# Patient Record
Sex: FEMALE | Race: WHITE | NOT HISPANIC OR LATINO | Employment: OTHER | URBAN - METROPOLITAN AREA
[De-identification: names, ages, dates, MRNs, and addresses within clinical notes are randomized per-mention and may not be internally consistent; named-entity substitution may affect disease eponyms.]

---

## 2017-01-24 RX ORDER — AMLODIPINE BESYLATE 5 MG/1
TABLET ORAL
Qty: 90 TABLET | Refills: 1 | Status: SHIPPED | OUTPATIENT
Start: 2017-01-24 | End: 2017-08-11 | Stop reason: SDUPTHER

## 2017-02-03 RX ORDER — LORAZEPAM 1 MG/1
TABLET ORAL
Qty: 30 TABLET | Refills: 0 | OUTPATIENT
Start: 2017-02-03

## 2017-04-10 RX ORDER — SIMVASTATIN 20 MG
TABLET ORAL
Qty: 90 TABLET | Refills: 0 | Status: SHIPPED | OUTPATIENT
Start: 2017-04-10 | End: 2017-07-05 | Stop reason: SDUPTHER

## 2017-04-15 DIAGNOSIS — R92.8 ABNORMAL MAMMOGRAM: Primary | ICD-10-CM

## 2017-04-18 ENCOUNTER — TELEPHONE (OUTPATIENT)
Dept: FAMILY MEDICINE CLINIC | Facility: CLINIC | Age: 82
End: 2017-04-18

## 2017-05-03 RX ORDER — LORAZEPAM 1 MG/1
TABLET ORAL
Qty: 30 TABLET | Refills: 0 | OUTPATIENT
Start: 2017-05-03 | End: 2017-08-25

## 2017-05-05 ENCOUNTER — TELEPHONE (OUTPATIENT)
Dept: FAMILY MEDICINE CLINIC | Facility: CLINIC | Age: 82
End: 2017-05-05

## 2017-05-05 DIAGNOSIS — N63.20 LEFT BREAST MASS: Primary | ICD-10-CM

## 2017-05-05 DIAGNOSIS — R63.4 ABNORMAL WEIGHT LOSS: ICD-10-CM

## 2017-05-25 ENCOUNTER — OFFICE VISIT (OUTPATIENT)
Dept: FAMILY MEDICINE CLINIC | Facility: CLINIC | Age: 82
End: 2017-05-25

## 2017-05-25 VITALS
TEMPERATURE: 97.7 F | SYSTOLIC BLOOD PRESSURE: 134 MMHG | WEIGHT: 132 LBS | BODY MASS INDEX: 21.97 KG/M2 | OXYGEN SATURATION: 98 % | DIASTOLIC BLOOD PRESSURE: 62 MMHG | HEART RATE: 64 BPM

## 2017-05-25 DIAGNOSIS — I10 BENIGN ESSENTIAL HYPERTENSION: ICD-10-CM

## 2017-05-25 DIAGNOSIS — Z79.899 DRUG THERAPY: ICD-10-CM

## 2017-05-25 DIAGNOSIS — E78.5 DYSLIPIDEMIA: ICD-10-CM

## 2017-05-25 DIAGNOSIS — N63.20 LEFT BREAST MASS: ICD-10-CM

## 2017-05-25 DIAGNOSIS — E03.8 OTHER SPECIFIED HYPOTHYROIDISM: ICD-10-CM

## 2017-05-25 DIAGNOSIS — E11.21 TYPE 2 DIABETES MELLITUS WITH DIABETIC NEPHROPATHY, WITHOUT LONG-TERM CURRENT USE OF INSULIN (HCC): Primary | ICD-10-CM

## 2017-05-25 DIAGNOSIS — R63.4 ABNORMAL WEIGHT LOSS: ICD-10-CM

## 2017-05-25 DIAGNOSIS — I25.10 CORONARY ARTERIOSCLEROSIS IN NATIVE ARTERY: ICD-10-CM

## 2017-05-25 PROCEDURE — 99214 OFFICE O/P EST MOD 30 MIN: CPT | Performed by: FAMILY MEDICINE

## 2017-05-25 RX ORDER — MULTIPLE VITAMINS W/ MINERALS TAB 9MG-400MCG
1 TAB ORAL DAILY
COMMUNITY
End: 2017-08-25 | Stop reason: HOSPADM

## 2017-05-26 LAB
ALBUMIN SERPL-MCNC: 3.9 G/DL (ref 3.5–4.7)
ALBUMIN/GLOB SERPL: 1.8 {RATIO} (ref 1.2–2.2)
ALP SERPL-CCNC: 45 IU/L (ref 39–117)
ALT SERPL-CCNC: 27 IU/L (ref 0–32)
AST SERPL-CCNC: 31 IU/L (ref 0–40)
BASOPHILS # BLD AUTO: 0 X10E3/UL (ref 0–0.2)
BASOPHILS NFR BLD AUTO: 0 %
BILIRUB SERPL-MCNC: 1.1 MG/DL (ref 0–1.2)
BUN SERPL-MCNC: 19 MG/DL (ref 8–27)
BUN/CREAT SERPL: 20 (ref 12–28)
CALCIUM SERPL-MCNC: 9.1 MG/DL (ref 8.7–10.3)
CHLORIDE SERPL-SCNC: 92 MMOL/L (ref 96–106)
CHOLEST SERPL-MCNC: 138 MG/DL (ref 100–199)
CO2 SERPL-SCNC: 22 MMOL/L (ref 18–29)
CREAT SERPL-MCNC: 0.94 MG/DL (ref 0.57–1)
EOSINOPHIL # BLD AUTO: 0 X10E3/UL (ref 0–0.4)
EOSINOPHIL NFR BLD AUTO: 1 %
ERYTHROCYTE [DISTWIDTH] IN BLOOD BY AUTOMATED COUNT: 14 % (ref 12.3–15.4)
GLOBULIN SER CALC-MCNC: 2.2 G/DL (ref 1.5–4.5)
GLUCOSE SERPL-MCNC: 109 MG/DL (ref 65–99)
HBA1C MFR BLD: 5.8 % (ref 4.8–5.6)
HCT VFR BLD AUTO: 40.3 % (ref 34–46.6)
HDLC SERPL-MCNC: 62 MG/DL
HGB BLD-MCNC: 13.8 G/DL (ref 11.1–15.9)
IMM GRANULOCYTES # BLD: 0 X10E3/UL (ref 0–0.1)
IMM GRANULOCYTES NFR BLD: 0 %
LDLC SERPL CALC-MCNC: 57 MG/DL (ref 0–99)
LYMPHOCYTES # BLD AUTO: 0.9 X10E3/UL (ref 0.7–3.1)
LYMPHOCYTES NFR BLD AUTO: 18 %
MCH RBC QN AUTO: 30.3 PG (ref 26.6–33)
MCHC RBC AUTO-ENTMCNC: 34.2 G/DL (ref 31.5–35.7)
MCV RBC AUTO: 88 FL (ref 79–97)
MONOCYTES # BLD AUTO: 0.7 X10E3/UL (ref 0.1–0.9)
MONOCYTES NFR BLD AUTO: 14 %
NEUTROPHILS # BLD AUTO: 3.3 X10E3/UL (ref 1.4–7)
NEUTROPHILS NFR BLD AUTO: 67 %
PLATELET # BLD AUTO: 210 X10E3/UL (ref 150–379)
POTASSIUM SERPL-SCNC: 4.4 MMOL/L (ref 3.5–5.2)
PROT SERPL-MCNC: 6.1 G/DL (ref 6–8.5)
RBC # BLD AUTO: 4.56 X10E6/UL (ref 3.77–5.28)
SODIUM SERPL-SCNC: 132 MMOL/L (ref 134–144)
TRIGL SERPL-MCNC: 93 MG/DL (ref 0–149)
VLDLC SERPL CALC-MCNC: 19 MG/DL (ref 5–40)
WBC # BLD AUTO: 4.9 X10E3/UL (ref 3.4–10.8)

## 2017-06-07 ENCOUNTER — TELEPHONE (OUTPATIENT)
Dept: FAMILY MEDICINE CLINIC | Facility: CLINIC | Age: 82
End: 2017-06-07

## 2017-06-08 RX ORDER — LORAZEPAM 1 MG/1
TABLET ORAL
Qty: 30 TABLET | Refills: 0 | OUTPATIENT
Start: 2017-06-08

## 2017-06-13 NOTE — TELEPHONE ENCOUNTER
It was negative.  I had seen (I thought) and placed in box with note for call back to patient, but I don't see scanned in.  Addendum on u/s report notes was negative.  Will need to call for pathology report again if not in scan file to make sure in chart permanently.    Thanks  RAMOS

## 2017-06-23 ENCOUNTER — TELEPHONE (OUTPATIENT)
Dept: FAMILY MEDICINE CLINIC | Facility: CLINIC | Age: 82
End: 2017-06-23

## 2017-06-24 NOTE — TELEPHONE ENCOUNTER
It does, but can take as long as keep simvistatin dose low and tolerating.  Her dose is lower, though could cut further to 10mg daily.

## 2017-07-06 RX ORDER — SIMVASTATIN 20 MG
TABLET ORAL
Qty: 90 TABLET | Refills: 0 | Status: SHIPPED | OUTPATIENT
Start: 2017-07-06 | End: 2017-10-05 | Stop reason: SDUPTHER

## 2017-07-14 RX ORDER — LEVOTHYROXINE SODIUM 0.07 MG/1
TABLET ORAL
Qty: 30 TABLET | Refills: 6 | Status: SHIPPED | OUTPATIENT
Start: 2017-07-14 | End: 2018-05-10 | Stop reason: SDUPTHER

## 2017-08-11 RX ORDER — AMLODIPINE BESYLATE 5 MG/1
TABLET ORAL
Qty: 90 TABLET | Refills: 1 | Status: SHIPPED | OUTPATIENT
Start: 2017-08-11 | End: 2017-10-24

## 2017-08-25 ENCOUNTER — TELEPHONE (OUTPATIENT)
Dept: FAMILY MEDICINE CLINIC | Facility: CLINIC | Age: 82
End: 2017-08-25

## 2017-08-25 ENCOUNTER — OFFICE VISIT (OUTPATIENT)
Dept: FAMILY MEDICINE CLINIC | Facility: CLINIC | Age: 82
End: 2017-08-25

## 2017-08-25 VITALS
HEART RATE: 65 BPM | BODY MASS INDEX: 26.24 KG/M2 | OXYGEN SATURATION: 99 % | DIASTOLIC BLOOD PRESSURE: 50 MMHG | WEIGHT: 139 LBS | SYSTOLIC BLOOD PRESSURE: 140 MMHG | HEIGHT: 61 IN

## 2017-08-25 DIAGNOSIS — M80.08XA AGE-RELATED OSTEOPOROSIS WITH CURRENT PATHOLOGICAL FRACTURE OF VERTEBRA, INITIAL ENCOUNTER (HCC): ICD-10-CM

## 2017-08-25 DIAGNOSIS — G89.29 CHRONIC MIDLINE LOW BACK PAIN WITHOUT SCIATICA: ICD-10-CM

## 2017-08-25 DIAGNOSIS — M54.50 CHRONIC MIDLINE LOW BACK PAIN WITHOUT SCIATICA: ICD-10-CM

## 2017-08-25 DIAGNOSIS — E03.9 HYPOTHYROIDISM, UNSPECIFIED TYPE: Primary | ICD-10-CM

## 2017-08-25 DIAGNOSIS — I48.0 PAROXYSMAL ATRIAL FIBRILLATION (HCC): ICD-10-CM

## 2017-08-25 DIAGNOSIS — IMO0001 VERTEBRAL COMPRESSION FRACTURE, SEQUELA: ICD-10-CM

## 2017-08-25 DIAGNOSIS — I25.10 CORONARY ARTERIOSCLEROSIS IN NATIVE ARTERY: ICD-10-CM

## 2017-08-25 PROCEDURE — 99214 OFFICE O/P EST MOD 30 MIN: CPT | Performed by: FAMILY MEDICINE

## 2017-08-25 PROCEDURE — 72100 X-RAY EXAM L-S SPINE 2/3 VWS: CPT | Performed by: FAMILY MEDICINE

## 2017-08-25 RX ORDER — DIAZEPAM 10 MG/1
TABLET ORAL
Qty: 21 TABLET | Refills: 0 | Status: SHIPPED | OUTPATIENT
Start: 2017-08-25 | End: 2017-10-24

## 2017-08-25 RX ORDER — POTASSIUM CHLORIDE 1500 MG/1
20 TABLET, FILM COATED, EXTENDED RELEASE ORAL DAILY
COMMUNITY
End: 2018-02-23 | Stop reason: SDUPTHER

## 2017-08-25 RX ORDER — DIAZEPAM 5 MG/1
TABLET ORAL
Qty: 35 TABLET | Refills: 0 | Status: SHIPPED | OUTPATIENT
Start: 2017-08-25 | End: 2018-06-08

## 2017-08-25 RX ORDER — LORAZEPAM 0.5 MG/1
TABLET ORAL
Qty: 14 TABLET | Refills: 0 | Status: SHIPPED | OUTPATIENT
Start: 2017-08-25 | End: 2017-10-24

## 2017-08-25 NOTE — TELEPHONE ENCOUNTER
Please call daughter Laura Grant, home # [phone] and let know I gave her mom scripts to transition to valium from ativan to wean off this class of medications as can increase risks for falls, fractures and cause memory loss. Important to call if any withdrawal symptoms or not acting herself.  I am going to wean her very, very slowly to make sure she is comfortable as possible, but these medications are very dangerous and we need to get her off the ativan.    I gave her Mother a print out of medications with directions, typed directions on the scripts and gave handout on withdrawal symptoms.

## 2017-08-25 NOTE — PATIENT INSTRUCTIONS
I am transitioning you to valium (diazepam) from lorazepam (ativan) to wean you off these medications.  Valium comes in relatively lower doses and longer half life making it easier to wean off (0.5mg of Ativan = 5mg of valium).  If any problems weaning, please call your doctor right away.      Benzodiazepine Withdrawal  Benzodiazepines are prescription medicines that decrease the activity of (depress) the central nervous system and cause changes in certain brain chemicals (neurotransmitters). Withdrawal is a group of physical and mental symptoms that can happen when you suddenly stop taking a medicine.  There are many types of benzodiazepines. Some benzodiazepines take effect quickly and stay in your system for a short amount of time (short-acting). Other benzodiazepines require more time to take effect and stay in your system for longer amounts of time (long-acting). The five most commonly prescribed benzodiazepines are:  · Alprazolam.  · Lorazepam.  · Clonazepam.  · Diazepam.  · Temazepam.  CAUSES   When you take benzodiazepines, your brain needs more and more of the medicine over time in order to get the same effects from it. This increased need is called tolerance. As you develop a tolerance, your brain adapts to the effects of the benzodiazepine and relies on these effects. This is called dependency. Withdrawal happens when you suddenly stop taking your medicine. This does not give your brain enough time to adapt to not having the medicine.  RISK FACTORS  This condition is more likely to develop in:   · People who have taken benzodiazepines for more than 1-2 weeks.  · People who have developed a tolerance for benzodiazepines.  · People who have developed a dependence on benzodiazepines.  · People who take high dosages of benzodiazepines.  · People who take doses of benzodiazepines that are higher than prescribed.  · People who take benzodiazepines without a prescription.  · People who use benzodiazepines with  other substances that depress the central nervous system, such as alcohol.  · People who have a history of drug or alcohol abuse.    SYMPTOMS  Symptoms of this condition may include:   · Difficulty sleeping.    · Anxiety.    · Restlessness.    · Irritability.    · Muscle aches.  · Involuntary shaking or trembling of a body part (tremor).  · Confusion and poor concentration.  · Vomiting.  · Sweating.  · Headaches.    · Feeling or seeing things that are not there (hallucinations).  · Seizures.    Symptoms of withdrawal from short-acting benzodiazepines may develop 1-2 days after you stop taking your medicine, and they may last for 2-4 weeks or longer.  Symptoms of withdrawal from long-acting benzodiazepines may develop 2-7 days after you stop taking your medicine, and they may last for 2-8 weeks or longer.  DIAGNOSIS  This condition may be diagnosed based on:   · Your symptoms.    · A physical exam. Your health care provider may check for:      Rapid heartbeat.      Rapid breathing.      Tremors.      High blood pressure.       · Blood tests.  · Urine tests.  · Your alcohol and drug habits.    · Your medical history.    TREATMENT  Treatment for this condition depends on:  · Your symptoms.  · The type of benzodiazepine you have been taking.  · How long you have been taking benzodiazepines.  Treatment usually involves starting you on a safe and stable dose of a benzodiazepine and then slowly lowering your dosage over time (tapered withdrawal). This may be done at a hospital or a treatment center.   Long-term treatment for this condition may involve medicine, counseling, and support groups.   HOME CARE INSTRUCTIONS  · Take over-the-counter and prescription medicines only as told by your health care provider.  · Check with your health care provider before starting new medicines.  · Keep all follow-up visits as told by your health care provider. This is important.  PREVENTION   · Do not take any benzodiazepines without a  prescription.  · Do not take more than your prescribed dosage.  · Do not mix benzodiazepines with alcohol or other medicines.  · Do not stop taking benzodiazepines without speaking with your health care provider.  SEEK MEDICAL CARE IF:  · You are not able to take your medicines as told by your health care provider.    · You have symptoms that get worse.  · You develop withdrawal symptoms during your tapered withdrawal.    · You develop a craving for drugs or alcohol.     · You experience withdrawal again (relapse).     SEEK IMMEDIATE MEDICAL CARE IF:  · You have a seizure.    · You become very confused.  · You lose consciousness.    · You have difficulty breathing.    · You have serious thoughts about hurting yourself or someone else.     This information is not intended to replace advice given to you by your health care provider. Make sure you discuss any questions you have with your health care provider.     Document Released: 12/06/2012 Document Revised: 04/10/2017 Document Reviewed: 06/08/2016  [employer] Interactive Patient Education ©2017 [employer]

## 2017-08-25 NOTE — PROGRESS NOTES
Subjective   Lisa Grant is a 89 y.o. female. Presents today for   Chief Complaint   Patient presents with   • Hypothyroidism       History of Present Illness    Review of Systems    {Common H&P Review Areas:49853}    Patient Active Problem List   Diagnosis   • Paroxysmal atrial fibrillation   • Angina pectoris   • Anxiety   • Atherosclerosis of aorta   • Coronary arteriosclerosis in native artery   • Benign essential hypertension   • Chronic kidney disease   • Claudication   • Congestive heart failure   • Constipation   • Type 2 diabetes mellitus with diabetic nephropathy   • Dry skin dermatitis   • Dyslipidemia   • Gastroesophageal reflux disease with esophagitis   • Glaucoma   • Hypothyroidism   • Knee pain   • Lymphedema   • Onychomycosis   • Primary insomnia   • Tinea pedis   • Vitamin D deficiency       Allergies   Allergen Reactions   • Codeine    • Hydrocodone-Acetaminophen    • Metoclopramide    • Oxycodone    • Propoxyphene        Current Outpatient Prescriptions on File Prior to Visit   Medication Sig Dispense Refill   • amiodarone (PACERONE) 200 MG tablet Take 200 mg by mouth Daily.     • amLODIPine (NORVASC) 5 MG tablet TAKE ONE TABLET BY MOUTH ONCE DAILY FOR BLOOD PRESSURE 90 tablet 1   • aspirin 81 MG chewable tablet Chew 81 mg Daily.     • clopidogrel (PLAVIX) 75 MG tablet Take 75 mg by mouth Daily.     • furosemide (LASIX) 40 MG tablet TAKE ONE TABLET BY MOUTH ONCE DAILY IN THE MORNING FOR  FLUID  RETENSION  AND  BLOOD  PRESSURE 30 tablet 6   • levothyroxine (SYNTHROID, LEVOTHROID) 75 MCG tablet TAKE ONE TABLET BY MOUTH ONCE DAILY 30 tablet 6   • LORazepam (ATIVAN) 1 MG tablet TAKE ONE TABLET BY MOUTH ONCE DAILY 30 tablet 0   • losartan (COZAAR) 100 MG tablet Take 100 mg by mouth Daily.     • metoprolol succinate XL (TOPROL-XL) 50 MG 24 hr tablet Take  by mouth.     • pantoprazole (PROTONIX) 40 MG EC tablet Take 40 mg by mouth Daily.     • Polyethylene Glycol 3350 granules Take  by mouth daily.    "  • simvastatin (ZOCOR) 20 MG tablet TAKE ONE TABLET BY MOUTH ONCE DAILY FOR CHOLESTEROL AND  HEART  HEALTH 90 tablet 0   • CALCIUM PO Take  by mouth.     • nitroglycerin (NITROLINGUAL) 0.4 MG/SPRAY spray Nitroglycerin 0.4 MG/SPRAY Translingual Solution; Patient Sig: Nitroglycerin 0.4 MG/SPRAY Translingual Solution USE AS DIRECTED.; 0; 11-Sep-2013; Active     • [DISCONTINUED] Multiple Vitamins-Minerals (MULTIVITAMIN WITH MINERALS) tablet tablet Take 1 tablet by mouth Daily.       No current facility-administered medications on file prior to visit.        Objective   Vitals:    08/25/17 1302   BP: 140/50   BP Location: Right arm   Patient Position: Sitting   Cuff Size: Adult   Pulse: 65   SpO2: 99%   Weight: 139 lb (63 kg)   Height: 61\" (154.9 cm)       Physical Exam   Constitutional: She appears well-developed and well-nourished.   HENT:   Head: Normocephalic and atraumatic.   Neck: Neck supple. No JVD present. No thyromegaly present.   Cardiovascular: Normal rate, regular rhythm and normal heart sounds.  Exam reveals no gallop and no friction rub.    No murmur heard.  Pulmonary/Chest: Effort normal and breath sounds normal. No respiratory distress. She has no wheezes. She has no rales.   Abdominal: Soft. Bowel sounds are normal. She exhibits no distension. There is no tenderness. There is no rebound and no guarding.   Musculoskeletal: She exhibits no edema.   Neurological: She is alert.   Skin: Skin is warm and dry.   Psychiatric: She has a normal mood and affect. Her behavior is normal.   Nursing note and vitals reviewed.            Assessment/Plan   {Assess/PlanSmartLinks:89823}         -Follow up: {NUMBERS; 0-10:54568} {time:11}       Current Outpatient Prescriptions:   •  amiodarone (PACERONE) 200 MG tablet, Take 200 mg by mouth Daily., Disp: , Rfl:   •  amLODIPine (NORVASC) 5 MG tablet, TAKE ONE TABLET BY MOUTH ONCE DAILY FOR BLOOD PRESSURE, Disp: 90 tablet, Rfl: 1  •  aspirin 81 MG chewable tablet, Chew 81 mg " Daily., Disp: , Rfl:   •  clopidogrel (PLAVIX) 75 MG tablet, Take 75 mg by mouth Daily., Disp: , Rfl:   •  furosemide (LASIX) 40 MG tablet, TAKE ONE TABLET BY MOUTH ONCE DAILY IN THE MORNING FOR  FLUID  RETENSION  AND  BLOOD  PRESSURE, Disp: 30 tablet, Rfl: 6  •  levothyroxine (SYNTHROID, LEVOTHROID) 75 MCG tablet, TAKE ONE TABLET BY MOUTH ONCE DAILY, Disp: 30 tablet, Rfl: 6  •  LORazepam (ATIVAN) 1 MG tablet, TAKE ONE TABLET BY MOUTH ONCE DAILY, Disp: 30 tablet, Rfl: 0  •  losartan (COZAAR) 100 MG tablet, Take 100 mg by mouth Daily., Disp: , Rfl:   •  metoprolol succinate XL (TOPROL-XL) 50 MG 24 hr tablet, Take  by mouth., Disp: , Rfl:   •  pantoprazole (PROTONIX) 40 MG EC tablet, Take 40 mg by mouth Daily., Disp: , Rfl:   •  Polyethylene Glycol 3350 granules, Take  by mouth daily., Disp: , Rfl:   •  potassium chloride ER (K-TAB) 20 MEQ tablet controlled-release ER tablet, Take 20 mEq by mouth Daily., Disp: , Rfl:   •  simvastatin (ZOCOR) 20 MG tablet, TAKE ONE TABLET BY MOUTH ONCE DAILY FOR CHOLESTEROL AND  HEART  HEALTH, Disp: 90 tablet, Rfl: 0  •  CALCIUM PO, Take  by mouth., Disp: , Rfl:   •  nitroglycerin (NITROLINGUAL) 0.4 MG/SPRAY spray, Nitroglycerin 0.4 MG/SPRAY Translingual Solution; Patient Sig: Nitroglycerin 0.4 MG/SPRAY Translingual Solution USE AS DIRECTED.; 0; 11-Sep-2013; Active, Disp: , Rfl:

## 2017-08-25 NOTE — PROGRESS NOTES
Subjective   Lisa Grant is a 89 y.o. female. Presents today for   Chief Complaint   Patient presents with   • Hypothyroidism     Hospital f/u as well:  Was admitted June and July;  Last admit had cardiac cath;  Stents widely patent;  Found to have a. Fib, declined anticoagulation as had GI bleed in past per notes.  Was started on amiodarone and will need thyroid rechecked.      Hypothyroidism   This is a chronic problem. The current episode started more than 1 year ago. The problem occurs constantly. The problem has been unchanged. Pertinent negatives include no abdominal pain, chest pain, chills, fever, nausea or vomiting. Nothing aggravates the symptoms. Treatments tried: on levothyroxine. Improvement on treatment: Needs rechecked on amiodarone.   Back Pain   This is a recurrent (Denies falls or trauma) problem. The current episode started more than 1 year ago (Prior surgery for radicular symptoms;). The problem occurs constantly. The problem is unchanged. The pain is present in the lumbar spine. The quality of the pain is described as aching. The pain does not radiate. The pain is moderate. The symptoms are aggravated by bending (Not able to stand up straight.). Pertinent negatives include no abdominal pain, chest pain or fever.   Coronary Artery Disease   Presents for follow-up (reports no further cp since d/c from [facility] in July;  Also no further dizziness.) visit. Pertinent negatives include no chest pain, chest pressure, chest tightness, dizziness, leg swelling, palpitations or shortness of breath. Risk factors do not include hypertension. The symptoms have been stable. Compliance with medications is good.   Atrial Fibrillation   Presents for follow-up visit. Symptoms are negative for bradycardia, chest pain, dizziness, hemodynamic instability, hypertension, hypotension, palpitations, shortness of breath, syncope and tachycardia. The symptoms have been stable. Past medical history includes atrial fibrillation  and CAD.     Since last seen had bx negative breast lesion.    Review of Systems   Constitutional: Negative for chills and fever.   Respiratory: Negative for chest tightness and shortness of breath.    Cardiovascular: Negative for chest pain, palpitations, leg swelling and syncope.   Gastrointestinal: Negative for abdominal pain, blood in stool, nausea and vomiting.   Musculoskeletal: Positive for back pain.   Neurological: Negative for dizziness, syncope, facial asymmetry and light-headedness.       The following portions of the patient's history were reviewed and updated as appropriate: allergies, current medications, past medical history, past surgical history and problem list.    Patient Active Problem List   Diagnosis   • Paroxysmal atrial fibrillation   • Angina pectoris   • Anxiety   • Atherosclerosis of aorta   • Coronary arteriosclerosis in native artery   • Benign essential hypertension   • Chronic kidney disease   • Claudication   • Congestive heart failure   • Constipation   • Type 2 diabetes mellitus with diabetic nephropathy   • Dry skin dermatitis   • Dyslipidemia   • Gastroesophageal reflux disease with esophagitis   • Glaucoma   • Hypothyroidism   • Knee pain   • Lymphedema   • Onychomycosis   • Primary insomnia   • Tinea pedis   • Vitamin D deficiency       Allergies   Allergen Reactions   • Codeine    • Hydrocodone-Acetaminophen    • Metoclopramide    • Oxycodone    • Propoxyphene        Current Outpatient Prescriptions on File Prior to Visit   Medication Sig Dispense Refill   • amiodarone (PACERONE) 200 MG tablet Take 200 mg by mouth Daily.     • amLODIPine (NORVASC) 5 MG tablet TAKE ONE TABLET BY MOUTH ONCE DAILY FOR BLOOD PRESSURE 90 tablet 1   • aspirin 81 MG chewable tablet Chew 81 mg Daily.     • clopidogrel (PLAVIX) 75 MG tablet Take 75 mg by mouth Daily.     • furosemide (LASIX) 40 MG tablet TAKE ONE TABLET BY MOUTH ONCE DAILY IN THE MORNING FOR  FLUID  RETENSION  AND  BLOOD  PRESSURE 30  "tablet 6   • levothyroxine (SYNTHROID, LEVOTHROID) 75 MCG tablet TAKE ONE TABLET BY MOUTH ONCE DAILY 30 tablet 6   • LORazepam (ATIVAN) 1 MG tablet TAKE ONE TABLET BY MOUTH ONCE DAILY 30 tablet 0   • losartan (COZAAR) 100 MG tablet Take 100 mg by mouth Daily.     • metoprolol succinate XL (TOPROL-XL) 50 MG 24 hr tablet Take  by mouth.     • pantoprazole (PROTONIX) 40 MG EC tablet Take 40 mg by mouth Daily.     • Polyethylene Glycol 3350 granules Take  by mouth daily.     • simvastatin (ZOCOR) 20 MG tablet TAKE ONE TABLET BY MOUTH ONCE DAILY FOR CHOLESTEROL AND  HEART  HEALTH 90 tablet 0   • CALCIUM PO Take  by mouth.     • nitroglycerin (NITROLINGUAL) 0.4 MG/SPRAY spray Nitroglycerin 0.4 MG/SPRAY Translingual Solution; Patient Sig: Nitroglycerin 0.4 MG/SPRAY Translingual Solution USE AS DIRECTED.; 0; 11-Sep-2013; Active     • [DISCONTINUED] Multiple Vitamins-Minerals (MULTIVITAMIN WITH MINERALS) tablet tablet Take 1 tablet by mouth Daily.       No current facility-administered medications on file prior to visit.        Objective   Vitals:    08/25/17 1302   BP: 140/50   BP Location: Right arm   Patient Position: Sitting   Cuff Size: Adult   Pulse: 65   SpO2: 99%   Weight: 139 lb (63 kg)   Height: 61\" (154.9 cm)       Physical Exam   Constitutional: She appears well-developed and well-nourished.   HENT:   Head: Normocephalic and atraumatic.   Neck: Neck supple. No JVD present. No thyromegaly present.   Cardiovascular: Normal rate, regular rhythm and normal heart sounds.  Exam reveals no gallop and no friction rub.    No murmur heard.  Pulmonary/Chest: Effort normal and breath sounds normal. No respiratory distress. She has no wheezes. She has no rales.   Abdominal: Soft. Bowel sounds are normal. She exhibits no distension. There is no tenderness. There is no rebound and no guarding.   Musculoskeletal: She exhibits no edema.        Lumbar back: She exhibits decreased range of motion, tenderness and bony tenderness. "   Neurological: She is alert.   Skin: Skin is warm and dry.   Psychiatric: She has a normal mood and affect. Her behavior is normal.   Nursing note and vitals reviewed.    XRAY: L-spine  INDICATION:  Low back pain  Wet read:  DDD, spurring;  ? L2 anterior end plate compression fracture.  osteopenia  No Comparative data  Imagine independently viewed by myself  Radiology reading pending.        Assessment/Plan   Lisa was seen today for hypothyroidism.    Diagnoses and all orders for this visit:    Hypothyroidism, unspecified type  -     TSH  -     T4, Free  -     T3, Free    Chronic midline low back pain without sciatica  -     XR Spine Lumbar 2 or 3 View (In Office)  -     DEXA Bone Density Axial  -     CT lumbar spine wo contrast    Coronary arteriosclerosis in native artery    Paroxysmal atrial fibrillation    Vertebral compression fracture, sequela  -     DEXA Bone Density Axial  -     CT lumbar spine wo contrast    Age-related osteoporosis with current pathological fracture of vertebra, initial encounter   -     DEXA Bone Density Axial      -chest pain resolved, medical mgmt of cad;  Had lipids drawn 3 months ago and well controlled;  No further dizziness;  Hx PAF on amiodarone now, will need to check thyroid and monitor closely.  Not on anticoagulation per notes as hx of GI bleed  -cad RF reduction, Lipid, BP and BS control.  -back pain - ? Compression fx vs spurring, will order CT lumbar spine and dexa;  Recommend apap and ice prn for now;  D/w topical pain relief as risks with other medications.  -I discussed today bzd short and long-term risks.  I also discussed that it is highly recommended that the patient wean off this medication given serious risks.  We discussed signs/symptoms of withdrawal and should not stop all at once and that can have withdrawal seizures and in rare cases death has been reported.  After discussion, I related that I am going to stop prescribing this class of drugs for the most part  and for daily, long-term use.  If desires to continue, I will have to refer to a specialist though doesn't constitute a promise psychiatry or another provider will prescribe.  Patient would like to wean.  I will transition to valium 1st as less likely to have withdrawal symptoms and to call or return immediately if has problems.  Patient made aware of risks of being on 2 bzd during transition time.  Patient elderly and concerned about risks of weaning, has daughter per patient lives 10 minutes away and had up date hippa form as okay with calling to notify would attempt a slow wean and call if running into issues.  Patient reports to me today that cannot sleep well and wanted to come off.           -Follow up: 2 months       Current Outpatient Prescriptions:   •  amiodarone (PACERONE) 200 MG tablet, Take 200 mg by mouth Daily., Disp: , Rfl:   •  amLODIPine (NORVASC) 5 MG tablet, TAKE ONE TABLET BY MOUTH ONCE DAILY FOR BLOOD PRESSURE, Disp: 90 tablet, Rfl: 1  •  aspirin 81 MG chewable tablet, Chew 81 mg Daily., Disp: , Rfl:   •  clopidogrel (PLAVIX) 75 MG tablet, Take 75 mg by mouth Daily., Disp: , Rfl:   •  furosemide (LASIX) 40 MG tablet, TAKE ONE TABLET BY MOUTH ONCE DAILY IN THE MORNING FOR  FLUID  RETENSION  AND  BLOOD  PRESSURE, Disp: 30 tablet, Rfl: 6  •  levothyroxine (SYNTHROID, LEVOTHROID) 75 MCG tablet, TAKE ONE TABLET BY MOUTH ONCE DAILY, Disp: 30 tablet, Rfl: 6  •  LORazepam (ATIVAN) 1 MG tablet, TAKE ONE TABLET BY MOUTH ONCE DAILY, Disp: 30 tablet, Rfl: 0  •  losartan (COZAAR) 100 MG tablet, Take 100 mg by mouth Daily., Disp: , Rfl:   •  metoprolol succinate XL (TOPROL-XL) 50 MG 24 hr tablet, Take  by mouth., Disp: , Rfl:   •  pantoprazole (PROTONIX) 40 MG EC tablet, Take 40 mg by mouth Daily., Disp: , Rfl:   •  Polyethylene Glycol 3350 granules, Take  by mouth daily., Disp: , Rfl:   •  potassium chloride ER (K-TAB) 20 MEQ tablet controlled-release ER tablet, Take 20 mEq by mouth Daily., Disp: , Rfl:   •   simvastatin (ZOCOR) 20 MG tablet, TAKE ONE TABLET BY MOUTH ONCE DAILY FOR CHOLESTEROL AND  HEART  HEALTH, Disp: 90 tablet, Rfl: 0  •  CALCIUM PO, Take  by mouth., Disp: , Rfl:   •  nitroglycerin (NITROLINGUAL) 0.4 MG/SPRAY spray, Nitroglycerin 0.4 MG/SPRAY Translingual Solution; Patient Sig: Nitroglycerin 0.4 MG/SPRAY Translingual Solution USE AS DIRECTED.; 0; 11-Sep-2013; Active, Disp: , Rfl:

## 2017-08-26 LAB
T3FREE SERPL-MCNC: 1.5 PG/ML (ref 2–4.4)
T4 FREE SERPL-MCNC: 1.7 NG/DL (ref 0.82–1.77)
TSH SERPL DL<=0.005 MIU/L-ACNC: 2.6 UIU/ML (ref 0.45–4.5)

## 2017-08-27 NOTE — PROGRESS NOTES
Please call the patient regarding her abnormal result.  Patient free T4 and thyroid stimulating hormone are normal.  However T3 the more active thyroid hormone is low.  Her heart medicine can interfere with her thyroid and why checked. Continue levothyroxine and add cytomel 5mcg 1 po daily to take same time as levothyroxine (this replaced T3 directly).  Recheck TSH, free T4 and free T3 in 4 weeks dx hypothyroidism.

## 2017-08-28 DIAGNOSIS — E03.8 OTHER SPECIFIED HYPOTHYROIDISM: Primary | ICD-10-CM

## 2017-08-28 RX ORDER — LIOTHYRONINE SODIUM 5 UG/1
5 TABLET ORAL DAILY
Qty: 30 TABLET | Refills: 5 | Status: SHIPPED | OUTPATIENT
Start: 2017-08-28 | End: 2017-10-02 | Stop reason: SDUPTHER

## 2017-09-15 RX ORDER — ALENDRONATE SODIUM 70 MG/1
70 TABLET ORAL
Qty: 12 TABLET | Refills: 1 | Status: SHIPPED | OUTPATIENT
Start: 2017-09-15 | End: 2018-03-26 | Stop reason: SDUPTHER

## 2017-09-18 ENCOUNTER — TELEPHONE (OUTPATIENT)
Dept: FAMILY MEDICINE CLINIC | Facility: CLINIC | Age: 82
End: 2017-09-18

## 2017-09-19 ENCOUNTER — OFFICE VISIT (OUTPATIENT)
Dept: FAMILY MEDICINE CLINIC | Facility: CLINIC | Age: 82
End: 2017-09-19

## 2017-09-19 VITALS
DIASTOLIC BLOOD PRESSURE: 60 MMHG | TEMPERATURE: 98.2 F | SYSTOLIC BLOOD PRESSURE: 132 MMHG | HEART RATE: 65 BPM | OXYGEN SATURATION: 95 %

## 2017-09-19 DIAGNOSIS — S00.03XA SCALP CONTUSION: ICD-10-CM
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
## 2017-09-19 DIAGNOSIS — R29.898 WEAKNESS OF BOTH LOWER EXTREMITIES: ICD-10-CM

## 2017-09-19 PROCEDURE — 99214 OFFICE O/P EST MOD 30 MIN: CPT | Performed by: FAMILY MEDICINE

## 2017-09-19 NOTE — PROGRESS NOTES
Subjective   Lisa Grant is a 89 y.o. female. Presents today for   Chief Complaint   Patient presents with   • Fall     PT FELL DOWN LAST THURSDAY AND WENT TO [facility]. WILL CALL FOR RECORDS. HER HEAD AND LEGS ARE STILL HURTING.       Fall   The accident occurred 5 to 7 days ago. The fall occurred while walking (TURNED AROUND FROM BED TO GRAB WALKER AND LOST BALANCE, HIT LEFT SIDE OF HEAD;  HAD HEMATOMA;  No LOC;  Had received call from ER reportedly that needed seen right away to do neuro recheck, scans negative in ER;  Records were not sent however.). She fell from a height of 1 to 2 ft. She landed on hard floor. There was no blood loss. The point of impact was the head. Pain location: side of head will hurt if touches o/w no pain. The pain is mild. Exacerbated by: only if touches area hit;   Pertinent negatives include no abdominal pain, bowel incontinence, headaches, loss of consciousness, nausea, numbness, tingling, visual change or vomiting. She has tried ice for the symptoms. The treatment provided mild (Resolving with time.) relief.     Reports lower extremity weakness, was prior to fall and unchanged.  Patient does not drive and has hard time travelling outside of home except for appts.  Had [facility]NA after AMI back in June, but completed.    Review of Systems   Constitutional: Unexpected weight change: Weight gain or loss.   Respiratory: Negative for shortness of breath and wheezing.    Cardiovascular: Negative for chest pain, palpitations and leg swelling.   Gastrointestinal: Negative for abdominal pain, bowel incontinence, nausea and vomiting.   Musculoskeletal: Negative for myalgias.   Neurological: Negative for dizziness, tingling, tremors, loss of consciousness, syncope, facial asymmetry, speech difficulty, weakness, light-headedness, numbness and headaches.       The following portions of the patient's history were reviewed and updated as appropriate: allergies, current medications, past medical history and  problem list.    Patient Active Problem List   Diagnosis   • Paroxysmal atrial fibrillation   • Angina pectoris   • Anxiety   • Atherosclerosis of aorta   • Coronary arteriosclerosis in native artery   • Benign essential hypertension   • Chronic kidney disease   • Claudication   • Congestive heart failure   • Constipation   • Type 2 diabetes mellitus with diabetic nephropathy   • Dry skin dermatitis   • Dyslipidemia   • Gastroesophageal reflux disease with esophagitis   • Glaucoma   • Hypothyroidism   • Knee pain   • Lymphedema   • Onychomycosis   • Primary insomnia   • Tinea pedis   • Vitamin D deficiency       Allergies   Allergen Reactions   • Codeine    • Hydrocodone-Acetaminophen    • Metoclopramide    • Oxycodone    • Propoxyphene        Current Outpatient Prescriptions on File Prior to Visit   Medication Sig Dispense Refill   • alendronate (FOSAMAX) 70 MG tablet Take 1 tablet by mouth Every 7 (Seven) Days. 12 tablet 1   • amiodarone (PACERONE) 200 MG tablet Take 200 mg by mouth Daily.     • amLODIPine (NORVASC) 5 MG tablet TAKE ONE TABLET BY MOUTH ONCE DAILY FOR BLOOD PRESSURE 90 tablet 1   • aspirin 81 MG chewable tablet Chew 81 mg Daily.     • CALCIUM PO Take  by mouth.     • clopidogrel (PLAVIX) 75 MG tablet Take 75 mg by mouth Daily.     • diazePAM (VALIUM) 10 MG tablet 1/2 lorazepam nightly with 1/2 valium nightly x 14 days, then stop lorazepam and valium 1 po nightly x 14 days. 21 tablet 0   • diazePAM (VALIUM) 5 MG tablet After 10mg valium start 1.5 tab 5mg valium nightly x 14 days, then 1 valium nighly x 14 days then see doctor for further weaning. 35 tablet 0   • furosemide (LASIX) 40 MG tablet TAKE ONE TABLET BY MOUTH ONCE DAILY IN THE MORNING FOR  FLUID  RETENSION  AND  BLOOD  PRESSURE 30 tablet 6   • levothyroxine (SYNTHROID, LEVOTHROID) 75 MCG tablet TAKE ONE TABLET BY MOUTH ONCE DAILY 30 tablet 6   • liothyronine (CYTOMEL) 5 MCG tablet Take 1 tablet by mouth Daily. 30 tablet 5   • LORazepam  (ATIVAN) 0.5 MG tablet 1 lorazepam nightly with 1/2 valium nightly x 14 days, then stop lorazepam and continue valium 1 po nightly x 14 days. 14 tablet 0   • losartan (COZAAR) 100 MG tablet Take 100 mg by mouth Daily.     • metoprolol succinate XL (TOPROL-XL) 50 MG 24 hr tablet Take  by mouth.     • nitroglycerin (NITROLINGUAL) 0.4 MG/SPRAY spray Nitroglycerin 0.4 MG/SPRAY Translingual Solution; Patient Sig: Nitroglycerin 0.4 MG/SPRAY Translingual Solution USE AS DIRECTED.; 0; 11-Sep-2013; Active     • pantoprazole (PROTONIX) 40 MG EC tablet Take 40 mg by mouth Daily.     • Polyethylene Glycol 3350 granules Take  by mouth daily.     • potassium chloride ER (K-TAB) 20 MEQ tablet controlled-release ER tablet Take 20 mEq by mouth Daily.     • simvastatin (ZOCOR) 20 MG tablet TAKE ONE TABLET BY MOUTH ONCE DAILY FOR CHOLESTEROL AND  HEART  HEALTH 90 tablet 0     No current facility-administered medications on file prior to visit.        Objective   Vitals:    09/19/17 1008   BP: 132/60   BP Location: Left arm   Patient Position: Sitting   Cuff Size: Adult   Pulse: 65   Temp: 98.2 °F (36.8 °C)   TempSrc: Oral   SpO2: 95%   Weight: Comment: PT IN WHEELCHAIR, UNABLE TO WEIGH TODAY       Physical Exam   Constitutional: She appears well-developed and well-nourished.   HENT:   Head: Normocephalic. Head is with contusion. Head is without raccoon's eyes.       Right Ear: Tympanic membrane, external ear and ear canal normal.   Left Ear: Tympanic membrane, external ear and ear canal normal.   Mouth/Throat: Uvula is midline, oropharynx is clear and moist and mucous membranes are normal.   Eyes: EOM are normal. Pupils are equal, round, and reactive to light.   Neck: Neck supple. No JVD present. No thyromegaly present.   Cardiovascular: Normal rate, regular rhythm and normal heart sounds.  Exam reveals no gallop and no friction rub.    No murmur heard.  Pulmonary/Chest: Effort normal and breath sounds normal. No respiratory distress.  She has no wheezes. She has no rales.   Abdominal: Soft. Bowel sounds are normal. She exhibits no distension. There is no tenderness. There is no rebound and no guarding.   Musculoskeletal: She exhibits no edema.   Neurological: She is alert. She displays normal reflexes. No cranial nerve deficit. She exhibits normal muscle tone. Coordination normal.   No pronator drift  Normal FTN  B/l lower extremity +3-4/5     Skin: Skin is warm and dry.   Psychiatric: She has a normal mood and affect. Her behavior is normal.   Nursing note and vitals reviewed.      Assessment/Plan   Lisa was seen today for fall.    Diagnoses and all orders for this visit:    Fall, subsequent encounter    Scalp contusion    Weakness of both lower extremities    -patient neurologically intact;  No focal neuro deficits;  Patient fell and is high risk for falls;  Has lower extremity weakness;  Would benefit from PT to reduce fall risk and improve mobility related ADLs;  Patient home bound status and unable to travel far due health status.  Had [facility]NA in past, will order again.  -ice 2-3 times a day to scalp as needed for pain;  APAP would be ok for pain as well.       -Follow up: 10/24/17        Current Outpatient Prescriptions:   •  alendronate (FOSAMAX) 70 MG tablet, Take 1 tablet by mouth Every 7 (Seven) Days., Disp: 12 tablet, Rfl: 1  •  amiodarone (PACERONE) 200 MG tablet, Take 200 mg by mouth Daily., Disp: , Rfl:   •  amLODIPine (NORVASC) 5 MG tablet, TAKE ONE TABLET BY MOUTH ONCE DAILY FOR BLOOD PRESSURE, Disp: 90 tablet, Rfl: 1  •  aspirin 81 MG chewable tablet, Chew 81 mg Daily., Disp: , Rfl:   •  CALCIUM PO, Take  by mouth., Disp: , Rfl:   •  clopidogrel (PLAVIX) 75 MG tablet, Take 75 mg by mouth Daily., Disp: , Rfl:   •  diazePAM (VALIUM) 10 MG tablet, 1/2 lorazepam nightly with 1/2 valium nightly x 14 days, then stop lorazepam and valium 1 po nightly x 14 days., Disp: 21 tablet, Rfl: 0  •  diazePAM (VALIUM) 5 MG tablet, After 10mg valium  start 1.5 tab 5mg valium nightly x 14 days, then 1 valium nighly x 14 days then see doctor for further weaning., Disp: 35 tablet, Rfl: 0  •  furosemide (LASIX) 40 MG tablet, TAKE ONE TABLET BY MOUTH ONCE DAILY IN THE MORNING FOR  FLUID  RETENSION  AND  BLOOD  PRESSURE, Disp: 30 tablet, Rfl: 6  •  levothyroxine (SYNTHROID, LEVOTHROID) 75 MCG tablet, TAKE ONE TABLET BY MOUTH ONCE DAILY, Disp: 30 tablet, Rfl: 6  •  liothyronine (CYTOMEL) 5 MCG tablet, Take 1 tablet by mouth Daily., Disp: 30 tablet, Rfl: 5  •  LORazepam (ATIVAN) 0.5 MG tablet, 1 lorazepam nightly with 1/2 valium nightly x 14 days, then stop lorazepam and continue valium 1 po nightly x 14 days., Disp: 14 tablet, Rfl: 0  •  losartan (COZAAR) 100 MG tablet, Take 100 mg by mouth Daily., Disp: , Rfl:   •  metoprolol succinate XL (TOPROL-XL) 50 MG 24 hr tablet, Take  by mouth., Disp: , Rfl:   •  nitroglycerin (NITROLINGUAL) 0.4 MG/SPRAY spray, Nitroglycerin 0.4 MG/SPRAY Translingual Solution; Patient Sig: Nitroglycerin 0.4 MG/SPRAY Translingual Solution USE AS DIRECTED.; 0; 11-Sep-2013; Active, Disp: , Rfl:   •  pantoprazole (PROTONIX) 40 MG EC tablet, Take 40 mg by mouth Daily., Disp: , Rfl:   •  Polyethylene Glycol 3350 granules, Take  by mouth daily., Disp: , Rfl:   •  potassium chloride ER (K-TAB) 20 MEQ tablet controlled-release ER tablet, Take 20 mEq by mouth Daily., Disp: , Rfl:   •  simvastatin (ZOCOR) 20 MG tablet, TAKE ONE TABLET BY MOUTH ONCE DAILY FOR CHOLESTEROL AND  HEART  HEALTH, Disp: 90 tablet, Rfl: 0

## 2017-09-21 ENCOUNTER — TELEPHONE (OUTPATIENT)
Dept: FAMILY MEDICINE CLINIC | Facility: CLINIC | Age: 82
End: 2017-09-21

## 2017-09-29 LAB
T3FREE SERPL-MCNC: 1.7 PG/ML (ref 2–4.4)
T4 FREE SERPL-MCNC: 1.54 NG/DL (ref 0.93–1.7)
TSH SERPL DL<=0.005 MIU/L-ACNC: 1.81 MIU/ML (ref 0.27–4.2)

## 2017-10-02 DIAGNOSIS — E03.8 OTHER SPECIFIED HYPOTHYROIDISM: Primary | ICD-10-CM

## 2017-10-02 RX ORDER — LIOTHYRONINE SODIUM 5 UG/1
TABLET ORAL
Qty: 45 TABLET | Refills: 5 | Status: SHIPPED | OUTPATIENT
Start: 2017-10-02 | End: 2018-05-04 | Stop reason: SDUPTHER

## 2017-10-05 RX ORDER — SIMVASTATIN 20 MG
TABLET ORAL
Qty: 90 TABLET | Refills: 1 | Status: SHIPPED | OUTPATIENT
Start: 2017-10-05 | End: 2017-12-08 | Stop reason: ALTCHOICE

## 2017-10-24 ENCOUNTER — OFFICE VISIT (OUTPATIENT)
Dept: FAMILY MEDICINE CLINIC | Facility: CLINIC | Age: 82
End: 2017-10-24

## 2017-10-24 VITALS
TEMPERATURE: 97.3 F | HEIGHT: 61 IN | HEART RATE: 68 BPM | BODY MASS INDEX: 24.17 KG/M2 | WEIGHT: 128 LBS | DIASTOLIC BLOOD PRESSURE: 70 MMHG | OXYGEN SATURATION: 98 % | SYSTOLIC BLOOD PRESSURE: 146 MMHG

## 2017-10-24 DIAGNOSIS — R60.0 LOWER EXTREMITY EDEMA: ICD-10-CM

## 2017-10-24 DIAGNOSIS — I10 BENIGN ESSENTIAL HYPERTENSION: ICD-10-CM

## 2017-10-24 DIAGNOSIS — R23.4 SKIN THICKENING: Primary | ICD-10-CM

## 2017-10-24 DIAGNOSIS — G89.29 CHRONIC BILATERAL LOW BACK PAIN WITHOUT SCIATICA: ICD-10-CM

## 2017-10-24 DIAGNOSIS — M54.50 CHRONIC BILATERAL LOW BACK PAIN WITHOUT SCIATICA: ICD-10-CM

## 2017-10-24 DIAGNOSIS — L85.3 DRY SKIN DERMATITIS: ICD-10-CM

## 2017-10-24 PROCEDURE — 99214 OFFICE O/P EST MOD 30 MIN: CPT | Performed by: FAMILY MEDICINE

## 2017-10-24 RX ORDER — FUROSEMIDE 40 MG/1
TABLET ORAL
Qty: 30 TABLET | Refills: 6 | Status: SHIPPED | OUTPATIENT
Start: 2017-10-24 | End: 2018-02-23 | Stop reason: SDUPTHER

## 2017-10-24 RX ORDER — METOPROLOL SUCCINATE 100 MG/1
100 TABLET, EXTENDED RELEASE ORAL DAILY
Qty: 30 TABLET | Refills: 5 | Status: SHIPPED | OUTPATIENT
Start: 2017-10-24 | End: 2018-04-30 | Stop reason: SDUPTHER

## 2017-10-24 RX ORDER — UREA 40 %
CREAM (GRAM) TOPICAL
Qty: 198 G | Refills: 5 | Status: SHIPPED | OUTPATIENT
Start: 2017-10-24 | End: 2019-01-25

## 2017-10-24 RX ORDER — GABAPENTIN 100 MG/1
CAPSULE ORAL
Qty: 60 CAPSULE | Refills: 2 | Status: SHIPPED | OUTPATIENT
Start: 2017-10-24 | End: 2019-01-25

## 2017-10-24 NOTE — PROGRESS NOTES
Subjective   Lisa Grant is a 89 y.o. female. Presents today for   Chief Complaint   Patient presents with   • Follow-up     pt here for 2 month follow up   • Edema     pt's legs and feet are very swollen. she said they have gotten worse the last 3 weeks. she said they usually swell during the day and go down at night, but now they stay swollen all the time.   • Back Pain     pt's back has been hurting so bad, she can not sleep at night.       Back Pain   This is a chronic problem. The current episode started more than 1 month ago. The problem occurs constantly. The problem is unchanged. The pain is present in the lumbar spine. The quality of the pain is described as aching. The pain does not radiate. The pain is severe. The symptoms are aggravated by bending and twisting. Pertinent negatives include no abdominal pain, chest pain, leg pain, numbness or tingling. (Had CT lumbar spine and extensive arthritis changes, see level by level by report.  No radiation, just swelling) She has tried analgesics for the symptoms. The treatment provided no relief (Not sure what dose of apap to take).   Leg Swelling   This is a chronic problem. The current episode started more than 1 month ago. The problem occurs constantly. The problem has been gradually worsening. Associated symptoms include arthralgias. Pertinent negatives include no abdominal pain, chest pain, nausea, numbness or vomiting. Associated symptoms comments: No chest pain, no pnd/orthopnea;  No soa. The symptoms are aggravated by walking and standing. Treatments tried: lasix and elevation. The treatment provided no relief.       Review of Systems   Respiratory: Negative for shortness of breath and wheezing.    Cardiovascular: Positive for leg swelling. Negative for chest pain.   Gastrointestinal: Negative for abdominal pain, nausea and vomiting.   Musculoskeletal: Positive for arthralgias and back pain.   Neurological: Negative for tingling, syncope and numbness.        The following portions of the patient's history were reviewed and updated as appropriate: allergies, current medications, past medical history and problem list.    Patient Active Problem List   Diagnosis   • Paroxysmal atrial fibrillation   • Angina pectoris   • Anxiety   • Atherosclerosis of aorta   • Coronary arteriosclerosis in native artery   • Benign essential hypertension   • Chronic kidney disease   • Claudication   • Congestive heart failure   • Constipation   • Type 2 diabetes mellitus with diabetic nephropathy   • Dry skin dermatitis   • Dyslipidemia   • Gastroesophageal reflux disease with esophagitis   • Glaucoma   • Hypothyroidism   • Knee pain   • Lymphedema   • Onychomycosis   • Primary insomnia   • Tinea pedis   • Vitamin D deficiency       Allergies   Allergen Reactions   • Codeine    • Hydrocodone-Acetaminophen    • Metoclopramide    • Oxycodone    • Propoxyphene        Current Outpatient Prescriptions on File Prior to Visit   Medication Sig Dispense Refill   • alendronate (FOSAMAX) 70 MG tablet Take 1 tablet by mouth Every 7 (Seven) Days. 12 tablet 1   • amLODIPine (NORVASC) 5 MG tablet TAKE ONE TABLET BY MOUTH ONCE DAILY FOR BLOOD PRESSURE 90 tablet 1   • aspirin 81 MG chewable tablet Chew 81 mg Daily.     • CALCIUM PO Take  by mouth.     • clopidogrel (PLAVIX) 75 MG tablet Take 75 mg by mouth Daily.     • diazePAM (VALIUM) 5 MG tablet After 10mg valium start 1.5 tab 5mg valium nightly x 14 days, then 1 valium nighly x 14 days then see doctor for further weaning. 35 tablet 0   • furosemide (LASIX) 40 MG tablet TAKE ONE TABLET BY MOUTH ONCE DAILY IN THE MORNING FOR  FLUID  RETENSION  AND  BLOOD  PRESSURE 30 tablet 6   • levothyroxine (SYNTHROID, LEVOTHROID) 75 MCG tablet TAKE ONE TABLET BY MOUTH ONCE DAILY 30 tablet 6   • liothyronine (CYTOMEL) 5 MCG tablet Take 1.5 tablets by mouth daily 45 tablet 5   • losartan (COZAAR) 100 MG tablet Take 100 mg by mouth Daily.     • metoprolol succinate  "XL (TOPROL-XL) 50 MG 24 hr tablet Take  by mouth.     • nitroglycerin (NITROLINGUAL) 0.4 MG/SPRAY spray Nitroglycerin 0.4 MG/SPRAY Translingual Solution; Patient Sig: Nitroglycerin 0.4 MG/SPRAY Translingual Solution USE AS DIRECTED.; 0; 11-Sep-2013; Active     • pantoprazole (PROTONIX) 40 MG EC tablet Take 40 mg by mouth Daily.     • Polyethylene Glycol 3350 granules Take  by mouth daily.     • potassium chloride ER (K-TAB) 20 MEQ tablet controlled-release ER tablet Take 20 mEq by mouth Daily.     • simvastatin (ZOCOR) 20 MG tablet TAKE ONE TABLET BY MOUTH ONCE DAILY FOR CHOLESTEROL AND HEART HEALTH 90 tablet 1   • [DISCONTINUED] amiodarone (PACERONE) 200 MG tablet Take 200 mg by mouth Daily.     • [DISCONTINUED] diazePAM (VALIUM) 10 MG tablet 1/2 lorazepam nightly with 1/2 valium nightly x 14 days, then stop lorazepam and valium 1 po nightly x 14 days. 21 tablet 0   • [DISCONTINUED] LORazepam (ATIVAN) 0.5 MG tablet 1 lorazepam nightly with 1/2 valium nightly x 14 days, then stop lorazepam and continue valium 1 po nightly x 14 days. 14 tablet 0     No current facility-administered medications on file prior to visit.        Objective   Vitals:    10/24/17 1344   BP: 146/70   BP Location: Left arm   Patient Position: Sitting   Cuff Size: Adult   Pulse: 68   Temp: 97.3 °F (36.3 °C)   TempSrc: Oral   SpO2: 98%   Weight: 128 lb (58.1 kg)   Height: 61\" (154.9 cm)       Physical Exam   Constitutional: She appears well-developed and well-nourished.   HENT:   Head: Normocephalic and atraumatic.   Neck: Neck supple. No JVD present. No thyromegaly present.   Cardiovascular: Normal rate, regular rhythm and normal heart sounds.  Exam reveals no gallop and no friction rub.    No murmur heard.  Pulmonary/Chest: Effort normal and breath sounds normal. No respiratory distress. She has no wheezes. She has no rales.   Abdominal: Soft. Bowel sounds are normal. She exhibits no distension. There is no tenderness. There is no rebound and " no guarding.   Musculoskeletal: She exhibits edema (2+ pitting edema;   thickened skin, skin cracking.).   Neurological: She is alert.   Skin: Skin is warm and dry.   Psychiatric: She has a normal mood and affect. Her behavior is normal.   Nursing note and vitals reviewed.      Assessment/Plan   Lisa was seen today for follow-up, edema and back pain.    Diagnoses and all orders for this visit:    Skin thickening  -     urea (CARMOL) 40 % cream; Apply  topically Daily. To lower extremities and feet    Dry skin dermatitis  -     urea (CARMOL) 40 % cream; Apply  topically Daily. To lower extremities and feet    Lower extremity edema  -     furosemide (LASIX) 40 MG tablet; 2 po daily x 7 days, then 1 po daily    Chronic bilateral low back pain without sciatica  -     gabapentin (NEURONTIN) 100 MG capsule; 1 po nightly x 14 days, then 1 po twice daily for back pain    Benign essential hypertension  -     metoprolol succinate XL (TOPROL-XL) 100 MG 24 hr tablet; Take 1 tablet by mouth Daily.          1) Stop amlodipine - bp medication that can cause leg swelling  2) Will increase metoprolol xl from 50mg daily to 100mg daily since stopping amlodipine.  May take 2 of the 50mg daily until out.  I sent in script for 100mg.  For high blood pressure.  3) Increase lasix to 2 tabs daily x 7 days, then back to 1 daily for leg swelling  4) I gave script for gabapentin for back pain, start night time then twice daily.  This is a low dose.  5) May take tylenol 650mg every 6 hours as needed for back pain (over the counter).  6) I sent in special cream for dry cracking legs.         -Follow up: 1 month       Current Outpatient Prescriptions:   •  alendronate (FOSAMAX) 70 MG tablet, Take 1 tablet by mouth Every 7 (Seven) Days., Disp: 12 tablet, Rfl: 1  •  amLODIPine (NORVASC) 5 MG tablet, TAKE ONE TABLET BY MOUTH ONCE DAILY FOR BLOOD PRESSURE, Disp: 90 tablet, Rfl: 1  •  aspirin 81 MG chewable tablet, Chew 81 mg Daily., Disp: , Rfl:   •   CALCIUM PO, Take  by mouth., Disp: , Rfl:   •  clopidogrel (PLAVIX) 75 MG tablet, Take 75 mg by mouth Daily., Disp: , Rfl:   •  diazePAM (VALIUM) 5 MG tablet, After 10mg valium start 1.5 tab 5mg valium nightly x 14 days, then 1 valium nighly x 14 days then see doctor for further weaning., Disp: 35 tablet, Rfl: 0  •  furosemide (LASIX) 40 MG tablet, TAKE ONE TABLET BY MOUTH ONCE DAILY IN THE MORNING FOR  FLUID  RETENSION  AND  BLOOD  PRESSURE, Disp: 30 tablet, Rfl: 6  •  levothyroxine (SYNTHROID, LEVOTHROID) 75 MCG tablet, TAKE ONE TABLET BY MOUTH ONCE DAILY, Disp: 30 tablet, Rfl: 6  •  liothyronine (CYTOMEL) 5 MCG tablet, Take 1.5 tablets by mouth daily, Disp: 45 tablet, Rfl: 5  •  losartan (COZAAR) 100 MG tablet, Take 100 mg by mouth Daily., Disp: , Rfl:   •  metoprolol succinate XL (TOPROL-XL) 50 MG 24 hr tablet, Take  by mouth., Disp: , Rfl:   •  nitroglycerin (NITROLINGUAL) 0.4 MG/SPRAY spray, Nitroglycerin 0.4 MG/SPRAY Translingual Solution; Patient Sig: Nitroglycerin 0.4 MG/SPRAY Translingual Solution USE AS DIRECTED.; 0; 11-Sep-2013; Active, Disp: , Rfl:   •  pantoprazole (PROTONIX) 40 MG EC tablet, Take 40 mg by mouth Daily., Disp: , Rfl:   •  Polyethylene Glycol 3350 granules, Take  by mouth daily., Disp: , Rfl:   •  potassium chloride ER (K-TAB) 20 MEQ tablet controlled-release ER tablet, Take 20 mEq by mouth Daily., Disp: , Rfl:   •  simvastatin (ZOCOR) 20 MG tablet, TAKE ONE TABLET BY MOUTH ONCE DAILY FOR CHOLESTEROL AND HEART HEALTH, Disp: 90 tablet, Rfl: 1

## 2017-10-24 NOTE — PATIENT INSTRUCTIONS
1) Stop amlodipine - bp medication that can cause leg swelling  2) Will increase metoprolol xl from 50mg daily to 100mg daily since stopping amlodipine.  May take 2 of the 50mg daily until out.  I sent in script for 100mg.  For high blood pressure.  3) Increase lasix to 2 tabs daily x 7 days, then back to 1 daily for leg swelling  4) I gave script for gabapentin for back pain, start night time then twice daily.  This is a low dose.  5) May take tylenol 650mg every 6 hours as needed for back pain (over the counter).  6) I sent in special cream for dry cracking legs.

## 2017-11-16 ENCOUNTER — OFFICE VISIT (OUTPATIENT)
Dept: FAMILY MEDICINE CLINIC | Facility: CLINIC | Age: 82
End: 2017-11-16

## 2017-11-16 VITALS
SYSTOLIC BLOOD PRESSURE: 140 MMHG | DIASTOLIC BLOOD PRESSURE: 60 MMHG | OXYGEN SATURATION: 99 % | HEART RATE: 61 BPM | TEMPERATURE: 97.5 F

## 2017-11-16 DIAGNOSIS — I10 BENIGN ESSENTIAL HYPERTENSION: Primary | ICD-10-CM

## 2017-11-16 DIAGNOSIS — I89.0 LYMPHEDEMA: ICD-10-CM

## 2017-11-16 PROCEDURE — 99213 OFFICE O/P EST LOW 20 MIN: CPT | Performed by: FAMILY MEDICINE

## 2017-11-16 NOTE — PROGRESS NOTES
Subjective   Lisa Grant is a 89 y.o. female. Presents today for   Chief Complaint   Patient presents with   • Follow-up     fell at home had pt and home health and doing better.       Hypertension   This is a chronic (had changed BP meds to see if off amlodipine swelling better, of which is somewhat better) problem. The current episode started more than 1 year ago. The problem is unchanged. The problem is controlled. Associated symptoms include peripheral edema. Pertinent negatives include no chest pain, orthopnea, palpitations, PND or shortness of breath. There are no associated agents to hypertension. Past treatments include angiotensin blockers and beta blockers. The current treatment provides moderate improvement. There are no compliance problems.      Stopped amlodipine due to swelling and increased losartan, bp fine;  Still some swelling daytime  Review of Systems   Respiratory: Negative for shortness of breath.    Cardiovascular: Negative for chest pain, palpitations, orthopnea and PND.       The following portions of the patient's history were reviewed and updated as appropriate: allergies, current medications, past medical history and problem list.    Patient Active Problem List   Diagnosis   • Paroxysmal atrial fibrillation   • Angina pectoris   • Anxiety   • Atherosclerosis of aorta   • Coronary arteriosclerosis in native artery   • Benign essential hypertension   • Chronic kidney disease   • Claudication   • Congestive heart failure   • Constipation   • Type 2 diabetes mellitus with diabetic nephropathy   • Dry skin dermatitis   • Dyslipidemia   • Gastroesophageal reflux disease with esophagitis   • Glaucoma   • Hypothyroidism   • Knee pain   • Lymphedema   • Onychomycosis   • Primary insomnia   • Tinea pedis   • Vitamin D deficiency       Allergies   Allergen Reactions   • Codeine    • Hydrocodone-Acetaminophen    • Metoclopramide    • Oxycodone    • Propoxyphene        Current Outpatient  Prescriptions on File Prior to Visit   Medication Sig Dispense Refill   • alendronate (FOSAMAX) 70 MG tablet Take 1 tablet by mouth Every 7 (Seven) Days. 12 tablet 1   • aspirin 81 MG chewable tablet Chew 81 mg Daily.     • CALCIUM PO Take  by mouth.     • clopidogrel (PLAVIX) 75 MG tablet Take 75 mg by mouth Daily.     • diazePAM (VALIUM) 5 MG tablet After 10mg valium start 1.5 tab 5mg valium nightly x 14 days, then 1 valium nighly x 14 days then see doctor for further weaning. 35 tablet 0   • furosemide (LASIX) 40 MG tablet 2 po daily x 7 days, then 1 po daily 30 tablet 6   • gabapentin (NEURONTIN) 100 MG capsule 1 po nightly x 14 days, then 1 po twice daily for back pain 60 capsule 2   • levothyroxine (SYNTHROID, LEVOTHROID) 75 MCG tablet TAKE ONE TABLET BY MOUTH ONCE DAILY 30 tablet 6   • liothyronine (CYTOMEL) 5 MCG tablet Take 1.5 tablets by mouth daily 45 tablet 5   • losartan (COZAAR) 100 MG tablet Take 100 mg by mouth Daily.     • metoprolol succinate XL (TOPROL-XL) 100 MG 24 hr tablet Take 1 tablet by mouth Daily. 30 tablet 5   • nitroglycerin (NITROLINGUAL) 0.4 MG/SPRAY spray Nitroglycerin 0.4 MG/SPRAY Translingual Solution; Patient Sig: Nitroglycerin 0.4 MG/SPRAY Translingual Solution USE AS DIRECTED.; 0; 11-Sep-2013; Active     • pantoprazole (PROTONIX) 40 MG EC tablet Take 40 mg by mouth Daily.     • Polyethylene Glycol 3350 granules Take  by mouth daily.     • potassium chloride ER (K-TAB) 20 MEQ tablet controlled-release ER tablet Take 20 mEq by mouth Daily.     • simvastatin (ZOCOR) 20 MG tablet TAKE ONE TABLET BY MOUTH ONCE DAILY FOR CHOLESTEROL AND HEART HEALTH 90 tablet 1   • urea (CARMOL) 40 % cream Apply  topically Daily. To lower extremities and feet 198 g 5     No current facility-administered medications on file prior to visit.        Objective   Vitals:    11/16/17 1313   BP: 140/60   Pulse: 61   Temp: 97.5 °F (36.4 °C)   SpO2: 99%       Physical Exam   Constitutional: She appears  well-developed and well-nourished.   HENT:   Head: Normocephalic and atraumatic.   Neck: Neck supple. No JVD present. No thyromegaly present.   Cardiovascular: Normal rate, regular rhythm and normal heart sounds.  Exam reveals no gallop and no friction rub.    No murmur heard.  Pulmonary/Chest: Effort normal and breath sounds normal. No respiratory distress. She has no wheezes. She has no rales.   Abdominal: Soft. Bowel sounds are normal. She exhibits no distension. There is no tenderness. There is no rebound and no guarding.   Musculoskeletal: She exhibits edema.   Neurological: She is alert.   Skin: Skin is warm and dry.   Psychiatric: She has a normal mood and affect. Her behavior is normal.   Nursing note and vitals reviewed.      Assessment/Plan   Lisa was seen today for follow-up.    Diagnoses and all orders for this visit:    Benign essential hypertension    Lymphedema      -elevate legs as able;  Follow low Na diet  -hypertension - controlled, continue medications  -reports no further falls and doing better over all       -Follow up: 6 months       Current Outpatient Prescriptions:   •  alendronate (FOSAMAX) 70 MG tablet, Take 1 tablet by mouth Every 7 (Seven) Days., Disp: 12 tablet, Rfl: 1  •  aspirin 81 MG chewable tablet, Chew 81 mg Daily., Disp: , Rfl:   •  CALCIUM PO, Take  by mouth., Disp: , Rfl:   •  clopidogrel (PLAVIX) 75 MG tablet, Take 75 mg by mouth Daily., Disp: , Rfl:   •  diazePAM (VALIUM) 5 MG tablet, After 10mg valium start 1.5 tab 5mg valium nightly x 14 days, then 1 valium nighly x 14 days then see doctor for further weaning., Disp: 35 tablet, Rfl: 0  •  furosemide (LASIX) 40 MG tablet, 2 po daily x 7 days, then 1 po daily, Disp: 30 tablet, Rfl: 6  •  gabapentin (NEURONTIN) 100 MG capsule, 1 po nightly x 14 days, then 1 po twice daily for back pain, Disp: 60 capsule, Rfl: 2  •  levothyroxine (SYNTHROID, LEVOTHROID) 75 MCG tablet, TAKE ONE TABLET BY MOUTH ONCE DAILY, Disp: 30 tablet, Rfl:  6  •  liothyronine (CYTOMEL) 5 MCG tablet, Take 1.5 tablets by mouth daily, Disp: 45 tablet, Rfl: 5  •  losartan (COZAAR) 100 MG tablet, Take 100 mg by mouth Daily., Disp: , Rfl:   •  metoprolol succinate XL (TOPROL-XL) 100 MG 24 hr tablet, Take 1 tablet by mouth Daily., Disp: 30 tablet, Rfl: 5  •  nitroglycerin (NITROLINGUAL) 0.4 MG/SPRAY spray, Nitroglycerin 0.4 MG/SPRAY Translingual Solution; Patient Sig: Nitroglycerin 0.4 MG/SPRAY Translingual Solution USE AS DIRECTED.; 0; 11-Sep-2013; Active, Disp: , Rfl:   •  pantoprazole (PROTONIX) 40 MG EC tablet, Take 40 mg by mouth Daily., Disp: , Rfl:   •  Polyethylene Glycol 3350 granules, Take  by mouth daily., Disp: , Rfl:   •  potassium chloride ER (K-TAB) 20 MEQ tablet controlled-release ER tablet, Take 20 mEq by mouth Daily., Disp: , Rfl:   •  simvastatin (ZOCOR) 20 MG tablet, TAKE ONE TABLET BY MOUTH ONCE DAILY FOR CHOLESTEROL AND HEART HEALTH, Disp: 90 tablet, Rfl: 1  •  urea (CARMOL) 40 % cream, Apply  topically Daily. To lower extremities and feet, Disp: 198 g, Rfl: 5

## 2017-12-08 RX ORDER — PRAVASTATIN SODIUM 20 MG
20 TABLET ORAL DAILY
Qty: 30 TABLET | Refills: 5 | Status: SHIPPED | OUTPATIENT
Start: 2017-12-08 | End: 2018-06-28 | Stop reason: SDUPTHER

## 2018-01-29 ENCOUNTER — TELEPHONE (OUTPATIENT)
Dept: FAMILY MEDICINE CLINIC | Facility: CLINIC | Age: 83
End: 2018-01-29

## 2018-01-29 RX ORDER — TRAZODONE HYDROCHLORIDE 50 MG/1
50 TABLET ORAL NIGHTLY
Qty: 30 TABLET | Refills: 2 | Status: SHIPPED | OUTPATIENT
Start: 2018-01-29 | End: 2019-01-25

## 2018-02-23 ENCOUNTER — OFFICE VISIT (OUTPATIENT)
Dept: FAMILY MEDICINE CLINIC | Facility: CLINIC | Age: 83
End: 2018-02-23

## 2018-02-23 VITALS
TEMPERATURE: 97.5 F | SYSTOLIC BLOOD PRESSURE: 110 MMHG | HEART RATE: 66 BPM | OXYGEN SATURATION: 95 % | DIASTOLIC BLOOD PRESSURE: 68 MMHG

## 2018-02-23 DIAGNOSIS — R60.0 LOWER EXTREMITY EDEMA: Primary | ICD-10-CM

## 2018-02-23 DIAGNOSIS — L85.3 DRY SKIN DERMATITIS: ICD-10-CM

## 2018-02-23 DIAGNOSIS — L98.491 SKIN ULCER, LIMITED TO BREAKDOWN OF SKIN (HCC): ICD-10-CM

## 2018-02-23 DIAGNOSIS — L03.115 CELLULITIS OF RIGHT LOWER EXTREMITY: ICD-10-CM

## 2018-02-23 DIAGNOSIS — R23.4 THICKENING, SKIN: ICD-10-CM

## 2018-02-23 PROCEDURE — 99214 OFFICE O/P EST MOD 30 MIN: CPT | Performed by: FAMILY MEDICINE

## 2018-02-23 RX ORDER — FUROSEMIDE 40 MG/1
TABLET ORAL
Qty: 30 TABLET | Refills: 6 | Status: SHIPPED | OUTPATIENT
Start: 2018-02-23 | End: 2018-04-02 | Stop reason: SDUPTHER

## 2018-02-23 RX ORDER — POTASSIUM CHLORIDE 1500 MG/1
20 TABLET, FILM COATED, EXTENDED RELEASE ORAL DAILY
Qty: 60 TABLET | Refills: 6 | Status: SHIPPED | OUTPATIENT
Start: 2018-02-23 | End: 2018-04-09

## 2018-02-23 RX ORDER — DOXYCYCLINE HYCLATE 100 MG/1
100 CAPSULE ORAL 2 TIMES DAILY
Qty: 20 CAPSULE | Refills: 0 | Status: SHIPPED | OUTPATIENT
Start: 2018-02-23 | End: 2018-04-02

## 2018-02-23 NOTE — PATIENT INSTRUCTIONS
KEEP LEGS ELEVATED ABOVE LEVEL OF HEART.  KEEP WRAPS ON LEGS UNTIL I SEE BACK IN 1 WEEK.  MAY REMOVE IF TOES NUMB OR SEVERE LEG PAIN, BUT LET ME KNOW IF HAPPENS.  START ANTIBIOTICS SENT TO PHARMACY.  FUROSEMIDE (LASIX) 40MG TAKE 2 EVERY AM X 1 WEEK THEN 1 EVERY AM ALONG WITH POTASSIUM PILL.  GO ER IF SHORTNESS OF BREATH, HIGH FEVERS OR LEGS DEVELOP SEVERE PAIN.

## 2018-02-23 NOTE — PROGRESS NOTES
Subjective   Lisa Grant is a 89 y.o. female. Presents today for   Chief Complaint   Patient presents with   • Cellulitis     PT'S RIGHT LEG HAS CELLULITIS       Leg Swelling   This is a recurrent problem. The current episode started 1 to 4 weeks ago. The problem occurs constantly. The problem has been waxing and waning (swelling severe, hurt wtih swellign and weeping on right.). Pertinent negatives include no abdominal pain, chest pain, chills, coughing, fever or numbness. Associated symptoms comments: NO PND/ORTHOPNEA;  . Exacerbated by: FEET DOWN;  LAST OV HAD STOPPED AMLODIPINE AND STARTED LASIX OF WHICH HAS HELPED. Treatments tried: ACE WRAP ON OWN, LASIX AND LEG ELEVATION. The treatment provided mild relief.   Hypertension   This is a chronic problem. The current episode started more than 1 year ago. The problem is unchanged. The problem is controlled. Pertinent negatives include no chest pain. (BP fine off amlodipine, had stopped as severe swelling with pitting edema;  Has off/on chronically.  Patient unclear if able to pick-up urea, but legs very thickened.) Agents associated with hypertension include thyroid hormones. Risk factors for coronary artery disease include post-menopausal state. Past treatments include beta blockers. The current treatment provides moderate improvement. There are no compliance problems.  Hypertensive end-organ damage includes CAD/MI.       Review of Systems   Constitutional: Negative for chills and fever.   Respiratory: Negative for cough.    Cardiovascular: Negative for chest pain.   Gastrointestinal: Negative for abdominal pain.   Neurological: Negative for numbness.       The following portions of the patient's history were reviewed and updated as appropriate: allergies, current medications, past medical history and problem list.    Patient Active Problem List   Diagnosis   • Paroxysmal atrial fibrillation   • Angina pectoris   • Anxiety   • Atherosclerosis of aorta   •  Coronary arteriosclerosis in native artery   • Benign essential hypertension   • Chronic kidney disease   • Claudication   • Congestive heart failure   • Constipation   • Type 2 diabetes mellitus with diabetic nephropathy   • Dry skin dermatitis   • Dyslipidemia   • Gastroesophageal reflux disease with esophagitis   • Glaucoma   • Hypothyroidism   • Knee pain   • Lymphedema   • Onychomycosis   • Primary insomnia   • Tinea pedis   • Vitamin D deficiency       Allergies   Allergen Reactions   • Codeine    • Hydrocodone-Acetaminophen    • Metoclopramide    • Oxycodone    • Propoxyphene        Current Outpatient Prescriptions on File Prior to Visit   Medication Sig Dispense Refill   • alendronate (FOSAMAX) 70 MG tablet Take 1 tablet by mouth Every 7 (Seven) Days. 12 tablet 1   • aspirin 81 MG chewable tablet Chew 81 mg Daily.     • CALCIUM PO Take  by mouth.     • clopidogrel (PLAVIX) 75 MG tablet Take 75 mg by mouth Daily.     • diazePAM (VALIUM) 5 MG tablet After 10mg valium start 1.5 tab 5mg valium nightly x 14 days, then 1 valium nighly x 14 days then see doctor for further weaning. 35 tablet 0   • furosemide (LASIX) 40 MG tablet 2 po daily x 7 days, then 1 po daily 30 tablet 6   • gabapentin (NEURONTIN) 100 MG capsule 1 po nightly x 14 days, then 1 po twice daily for back pain 60 capsule 2   • levothyroxine (SYNTHROID, LEVOTHROID) 75 MCG tablet TAKE ONE TABLET BY MOUTH ONCE DAILY 30 tablet 6   • liothyronine (CYTOMEL) 5 MCG tablet Take 1.5 tablets by mouth daily 45 tablet 5   • losartan (COZAAR) 100 MG tablet Take 100 mg by mouth Daily.     • metoprolol succinate XL (TOPROL-XL) 100 MG 24 hr tablet Take 1 tablet by mouth Daily. 30 tablet 5   • nitroglycerin (NITROLINGUAL) 0.4 MG/SPRAY spray Nitroglycerin 0.4 MG/SPRAY Translingual Solution; Patient Sig: Nitroglycerin 0.4 MG/SPRAY Translingual Solution USE AS DIRECTED.; 0; 11-Sep-2013; Active     • pantoprazole (PROTONIX) 40 MG EC tablet Take 40 mg by mouth Daily.      • Polyethylene Glycol 3350 granules Take  by mouth daily.     • potassium chloride ER (K-TAB) 20 MEQ tablet controlled-release ER tablet Take 20 mEq by mouth Daily.     • pravastatin (PRAVACHOL) 20 MG tablet Take 1 tablet by mouth Daily. 30 tablet 5   • traZODone (DESYREL) 50 MG tablet Take 1 tablet by mouth Every Night. 30 tablet 2   • urea (CARMOL) 40 % cream Apply  topically Daily. To lower extremities and feet 198 g 5     No current facility-administered medications on file prior to visit.        Objective   Vitals:    02/23/18 1559   BP: 110/68   BP Location: Right arm   Patient Position: Sitting   Cuff Size: Adult   Pulse: 66   Temp: 97.5 °F (36.4 °C)   TempSrc: Oral   SpO2: 95%   Weight: Comment: PT IN WHEELCHAIR       Physical Exam   Constitutional: She appears well-developed and well-nourished.   HENT:   Head: Normocephalic and atraumatic.   Neck: Neck supple. No JVD present. No thyromegaly present.   Cardiovascular: Normal rate, regular rhythm and normal heart sounds.  Exam reveals no gallop and no friction rub.    No murmur heard.  Pulmonary/Chest: Effort normal and breath sounds normal. No respiratory distress. She has no wheezes. She has no rales.   Abdominal: Soft. Bowel sounds are normal. She exhibits no distension. There is no tenderness. There is no rebound and no guarding.   Musculoskeletal: She exhibits edema.   Neurological: She is alert.   Skin: Skin is warm and dry.   B/L LOWER EXTREMITIES PITTING EDEMA;  NO CALF PAIN;  THICKENED AND SCALING SKIN FEET TO KNEES;  SUPERFICIAL SKIN BREAKDOWN AND WEEPING RIGHT > LEFT;  RIGHT ERYTHEMA, MILD WITH SCANT WARMTH BUT NON-TENDER.  PEDAL PULSES INTACT;  TOES AND FEET CAP REF <3SEC.   Psychiatric: She has a normal mood and affect. Her behavior is normal.   Nursing note and vitals reviewed.    With assistance of of MA applied triple abx oint mixed with 1% hydrocortisone and silvadene to both thickened areas of feet/leg.  Then applied UNNA BOOT wraps to  both legs toes to knee followed by sterile kerlix.  I then applied 6 inch ace wraps to mid-thigh b/l legs with 50% overlap.    Assessment/Plan   Lisa was seen today for cellulitis.    Diagnoses and all orders for this visit:    Lower extremity edema  -     furosemide (LASIX) 40 MG tablet; 2 po daily x 7 days, then 1 po daily  -     potassium chloride ER (K-TAB) 20 MEQ tablet controlled-release ER tablet; Take 1 tablet by mouth Daily.    Thickening, skin    Dry skin dermatitis    Skin ulcer, limited to breakdown of skin    Cellulitis of right lower extremity  -     doxycycline (VIBRAMYCIN) 100 MG capsule; Take 1 capsule by mouth 2 (Two) Times a Day.    KEEP LEGS ELEVATED ABOVE LEVEL OF HEART.  KEEP WRAPS ON LEGS UNTIL I SEE BACK IN 1 WEEK.  MAY REMOVE IF TOES NUMB OR SEVERE LEG PAIN, BUT LET ME KNOW IF HAPPENS.  START ANTIBIOTICS SENT TO PHARMACY.  FUROSEMIDE (LASIX) 40MG TAKE 2 EVERY AM X 1 WEEK THEN 1 EVERY AM ALONG WITH POTASSIUM PILL.  GO ER IF SHORTNESS OF BREATH, HIGH FEVERS OR LEGS DEVELOP SEVERE PAIN.  Medications as Rx above.           -Follow up: 1 week       Current Outpatient Prescriptions:   •  alendronate (FOSAMAX) 70 MG tablet, Take 1 tablet by mouth Every 7 (Seven) Days., Disp: 12 tablet, Rfl: 1  •  aspirin 81 MG chewable tablet, Chew 81 mg Daily., Disp: , Rfl:   •  CALCIUM PO, Take  by mouth., Disp: , Rfl:   •  clopidogrel (PLAVIX) 75 MG tablet, Take 75 mg by mouth Daily., Disp: , Rfl:   •  diazePAM (VALIUM) 5 MG tablet, After 10mg valium start 1.5 tab 5mg valium nightly x 14 days, then 1 valium nighly x 14 days then see doctor for further weaning., Disp: 35 tablet, Rfl: 0  •  furosemide (LASIX) 40 MG tablet, 2 po daily x 7 days, then 1 po daily, Disp: 30 tablet, Rfl: 6  •  gabapentin (NEURONTIN) 100 MG capsule, 1 po nightly x 14 days, then 1 po twice daily for back pain, Disp: 60 capsule, Rfl: 2  •  levothyroxine (SYNTHROID, LEVOTHROID) 75 MCG tablet, TAKE ONE TABLET BY MOUTH ONCE DAILY, Disp: 30  tablet, Rfl: 6  •  liothyronine (CYTOMEL) 5 MCG tablet, Take 1.5 tablets by mouth daily, Disp: 45 tablet, Rfl: 5  •  losartan (COZAAR) 100 MG tablet, Take 100 mg by mouth Daily., Disp: , Rfl:   •  metoprolol succinate XL (TOPROL-XL) 100 MG 24 hr tablet, Take 1 tablet by mouth Daily., Disp: 30 tablet, Rfl: 5  •  nitroglycerin (NITROLINGUAL) 0.4 MG/SPRAY spray, Nitroglycerin 0.4 MG/SPRAY Translingual Solution; Patient Sig: Nitroglycerin 0.4 MG/SPRAY Translingual Solution USE AS DIRECTED.; 0; 11-Sep-2013; Active, Disp: , Rfl:   •  pantoprazole (PROTONIX) 40 MG EC tablet, Take 40 mg by mouth Daily., Disp: , Rfl:   •  Polyethylene Glycol 3350 granules, Take  by mouth daily., Disp: , Rfl:   •  potassium chloride ER (K-TAB) 20 MEQ tablet controlled-release ER tablet, Take 20 mEq by mouth Daily., Disp: , Rfl:   •  pravastatin (PRAVACHOL) 20 MG tablet, Take 1 tablet by mouth Daily., Disp: 30 tablet, Rfl: 5  •  traZODone (DESYREL) 50 MG tablet, Take 1 tablet by mouth Every Night., Disp: 30 tablet, Rfl: 2  •  urea (CARMOL) 40 % cream, Apply  topically Daily. To lower extremities and feet, Disp: 198 g, Rfl: 5

## 2018-03-02 ENCOUNTER — OFFICE VISIT (OUTPATIENT)
Dept: FAMILY MEDICINE CLINIC | Facility: CLINIC | Age: 83
End: 2018-03-02

## 2018-03-02 VITALS
TEMPERATURE: 97.6 F | OXYGEN SATURATION: 92 % | DIASTOLIC BLOOD PRESSURE: 60 MMHG | HEART RATE: 101 BPM | SYSTOLIC BLOOD PRESSURE: 120 MMHG

## 2018-03-02 DIAGNOSIS — S81.801D LEG WOUND, RIGHT, SUBSEQUENT ENCOUNTER: Primary | ICD-10-CM

## 2018-03-02 DIAGNOSIS — R60.0 LOWER EXTREMITY EDEMA: ICD-10-CM

## 2018-03-02 PROCEDURE — 99213 OFFICE O/P EST LOW 20 MIN: CPT | Performed by: FAMILY MEDICINE

## 2018-03-07 NOTE — PROGRESS NOTES
Subjective   Lisa Grant is a 89 y.o. female. Presents today for   Chief Complaint   Patient presents with   • Follow-up     sores on bilateral legs and still weeping       History of Present Illness  Patient here for f/u 1 week ago placed in unna boot with ace wraps and started diuretic.  Placed on abx for possible celluitis.  Left unna wrap in place but re-did ace wrap as was too loose after significant edema improvement.  Here for check again.  No f/c;  No cp/soa;  Legs feel better.  Had severe skin thickening and skin ulcerations last ov.    Review of Systems   Constitutional: Negative for chills and fever.   Respiratory: Negative for shortness of breath.    Cardiovascular: Positive for leg swelling. Negative for chest pain.   Skin: Positive for rash and wound.       The following portions of the patient's history were reviewed and updated as appropriate: allergies, current medications, past medical history and problem list.    Patient Active Problem List   Diagnosis   • Paroxysmal atrial fibrillation   • Angina pectoris   • Anxiety   • Atherosclerosis of aorta   • Coronary arteriosclerosis in native artery   • Benign essential hypertension   • Chronic kidney disease   • Claudication   • Congestive heart failure   • Constipation   • Type 2 diabetes mellitus with diabetic nephropathy   • Dry skin dermatitis   • Dyslipidemia   • Gastroesophageal reflux disease with esophagitis   • Glaucoma   • Hypothyroidism   • Knee pain   • Lymphedema   • Onychomycosis   • Primary insomnia   • Tinea pedis   • Vitamin D deficiency       Allergies   Allergen Reactions   • Codeine    • Hydrocodone-Acetaminophen    • Metoclopramide    • Oxycodone    • Propoxyphene        Current Outpatient Prescriptions on File Prior to Visit   Medication Sig Dispense Refill   • alendronate (FOSAMAX) 70 MG tablet Take 1 tablet by mouth Every 7 (Seven) Days. 12 tablet 1   • aspirin 81 MG chewable tablet Chew 81 mg Daily.     • CALCIUM PO Take  by  mouth.     • clopidogrel (PLAVIX) 75 MG tablet Take 75 mg by mouth Daily.     • diazePAM (VALIUM) 5 MG tablet After 10mg valium start 1.5 tab 5mg valium nightly x 14 days, then 1 valium nighly x 14 days then see doctor for further weaning. 35 tablet 0   • doxycycline (VIBRAMYCIN) 100 MG capsule Take 1 capsule by mouth 2 (Two) Times a Day. 20 capsule 0   • gabapentin (NEURONTIN) 100 MG capsule 1 po nightly x 14 days, then 1 po twice daily for back pain 60 capsule 2   • levothyroxine (SYNTHROID, LEVOTHROID) 75 MCG tablet TAKE ONE TABLET BY MOUTH ONCE DAILY 30 tablet 6   • liothyronine (CYTOMEL) 5 MCG tablet Take 1.5 tablets by mouth daily 45 tablet 5   • losartan (COZAAR) 100 MG tablet Take 100 mg by mouth Daily.     • metoprolol succinate XL (TOPROL-XL) 100 MG 24 hr tablet Take 1 tablet by mouth Daily. 30 tablet 5   • nitroglycerin (NITROLINGUAL) 0.4 MG/SPRAY spray Nitroglycerin 0.4 MG/SPRAY Translingual Solution; Patient Sig: Nitroglycerin 0.4 MG/SPRAY Translingual Solution USE AS DIRECTED.; 0; 11-Sep-2013; Active     • pantoprazole (PROTONIX) 40 MG EC tablet Take 40 mg by mouth Daily.     • Polyethylene Glycol 3350 granules Take  by mouth daily.     • potassium chloride ER (K-TAB) 20 MEQ tablet controlled-release ER tablet Take 1 tablet by mouth Daily. 60 tablet 6   • pravastatin (PRAVACHOL) 20 MG tablet Take 1 tablet by mouth Daily. 30 tablet 5   • traZODone (DESYREL) 50 MG tablet Take 1 tablet by mouth Every Night. 30 tablet 2   • urea (CARMOL) 40 % cream Apply  topically Daily. To lower extremities and feet 198 g 5   • furosemide (LASIX) 40 MG tablet 2 po daily x 7 days, then 1 po daily 30 tablet 6     No current facility-administered medications on file prior to visit.        Objective   Vitals:    03/02/18 1425   BP: 120/60   Pulse: 101   Temp: 97.6 °F (36.4 °C)   SpO2: 92%       Physical Exam   Constitutional: She appears well-developed and well-nourished.   HENT:   Head: Normocephalic and atraumatic.   Neck:  Neck supple. No JVD present. No thyromegaly present.   Cardiovascular: Normal rate, regular rhythm and normal heart sounds.  Exam reveals no gallop and no friction rub.    No murmur heard.  Pulmonary/Chest: Effort normal and breath sounds normal. No respiratory distress. She has no wheezes. She has no rales.   Abdominal: Soft. Bowel sounds are normal. She exhibits no distension. There is no tenderness. There is no rebound and no guarding.   Musculoskeletal: She exhibits edema (significantly improved).   Neurological: She is alert.   Skin: Skin is warm and dry.   Right posterior calf ulceration, but wound pink and healthy, mild bleeding with banadage removal as stuckt o wound.  Thickened skin greatly resolved, legs much more supple now.  Still some scale;  Edema greatly resolved.  No further redness and warmth.  Applied silvadene, HC 1% and bacitracin to areas of legs impacted, thickened and re-wrapped with new unna wrap then ace wrap 6 inch with 50% over lap.   Psychiatric: She has a normal mood and affect. Her behavior is normal.   Nursing note and vitals reviewed.      Assessment/Plan   Lisa was seen today for follow-up.    Diagnoses and all orders for this visit:    Leg wound, right, subsequent encounter    Lower extremity edema      -significant improved of swelling, scaling of skin nearly resolved after 1 week of treatment.  Still wound;  Wrapped again and will see back soon to check on patient.       -Follow up: 1 week       Current Outpatient Prescriptions:   •  alendronate (FOSAMAX) 70 MG tablet, Take 1 tablet by mouth Every 7 (Seven) Days., Disp: 12 tablet, Rfl: 1  •  aspirin 81 MG chewable tablet, Chew 81 mg Daily., Disp: , Rfl:   •  CALCIUM PO, Take  by mouth., Disp: , Rfl:   •  clopidogrel (PLAVIX) 75 MG tablet, Take 75 mg by mouth Daily., Disp: , Rfl:   •  diazePAM (VALIUM) 5 MG tablet, After 10mg valium start 1.5 tab 5mg valium nightly x 14 days, then 1 valium nighly x 14 days then see doctor for  further weaning., Disp: 35 tablet, Rfl: 0  •  doxycycline (VIBRAMYCIN) 100 MG capsule, Take 1 capsule by mouth 2 (Two) Times a Day., Disp: 20 capsule, Rfl: 0  •  gabapentin (NEURONTIN) 100 MG capsule, 1 po nightly x 14 days, then 1 po twice daily for back pain, Disp: 60 capsule, Rfl: 2  •  levothyroxine (SYNTHROID, LEVOTHROID) 75 MCG tablet, TAKE ONE TABLET BY MOUTH ONCE DAILY, Disp: 30 tablet, Rfl: 6  •  liothyronine (CYTOMEL) 5 MCG tablet, Take 1.5 tablets by mouth daily, Disp: 45 tablet, Rfl: 5  •  losartan (COZAAR) 100 MG tablet, Take 100 mg by mouth Daily., Disp: , Rfl:   •  metoprolol succinate XL (TOPROL-XL) 100 MG 24 hr tablet, Take 1 tablet by mouth Daily., Disp: 30 tablet, Rfl: 5  •  nitroglycerin (NITROLINGUAL) 0.4 MG/SPRAY spray, Nitroglycerin 0.4 MG/SPRAY Translingual Solution; Patient Sig: Nitroglycerin 0.4 MG/SPRAY Translingual Solution USE AS DIRECTED.; 0; 11-Sep-2013; Active, Disp: , Rfl:   •  pantoprazole (PROTONIX) 40 MG EC tablet, Take 40 mg by mouth Daily., Disp: , Rfl:   •  Polyethylene Glycol 3350 granules, Take  by mouth daily., Disp: , Rfl:   •  potassium chloride ER (K-TAB) 20 MEQ tablet controlled-release ER tablet, Take 1 tablet by mouth Daily., Disp: 60 tablet, Rfl: 6  •  pravastatin (PRAVACHOL) 20 MG tablet, Take 1 tablet by mouth Daily., Disp: 30 tablet, Rfl: 5  •  traZODone (DESYREL) 50 MG tablet, Take 1 tablet by mouth Every Night., Disp: 30 tablet, Rfl: 2  •  urea (CARMOL) 40 % cream, Apply  topically Daily. To lower extremities and feet, Disp: 198 g, Rfl: 5  •  furosemide (LASIX) 40 MG tablet, 2 po daily x 7 days, then 1 po daily, Disp: 30 tablet, Rfl: 6

## 2018-03-12 ENCOUNTER — TELEPHONE (OUTPATIENT)
Dept: FAMILY MEDICINE CLINIC | Facility: CLINIC | Age: 83
End: 2018-03-12

## 2018-03-12 NOTE — TELEPHONE ENCOUNTER
Send in bactroban 2% ointment and apply to wound bid.  Was to see last Friday to remove unna dressing, but fell.   Continue ace wraps for any swelling.    RRJ

## 2018-03-23 ENCOUNTER — TELEPHONE (OUTPATIENT)
Dept: FAMILY MEDICINE CLINIC | Facility: CLINIC | Age: 83
End: 2018-03-23

## 2018-03-26 RX ORDER — ALENDRONATE SODIUM 70 MG/1
TABLET ORAL
Qty: 12 TABLET | Refills: 1 | Status: SHIPPED | OUTPATIENT
Start: 2018-03-26 | End: 2018-12-03 | Stop reason: SDUPTHER

## 2018-03-26 NOTE — TELEPHONE ENCOUNTER
Spoke with pt and she looked at her bottle and she is on 40mg.  Dr. Reich said she can take 2 in am and vna needs to check bmp in one week. Pt informed and vna called. carlie

## 2018-03-26 NOTE — TELEPHONE ENCOUNTER
I have 40mg listed on med list, sure she is only on 20mg.  If so, okay lasix 40mg 1 po daily and continue potassium 1 po daily (verify dose, looks liek 20 meq on med list).

## 2018-04-02 ENCOUNTER — OFFICE VISIT (OUTPATIENT)
Dept: FAMILY MEDICINE CLINIC | Facility: CLINIC | Age: 83
End: 2018-04-02

## 2018-04-02 VITALS
SYSTOLIC BLOOD PRESSURE: 90 MMHG | OXYGEN SATURATION: 90 % | TEMPERATURE: 97.4 F | HEART RATE: 68 BPM | DIASTOLIC BLOOD PRESSURE: 58 MMHG

## 2018-04-02 DIAGNOSIS — L03.115 CELLULITIS OF RIGHT LOWER EXTREMITY: ICD-10-CM

## 2018-04-02 DIAGNOSIS — R60.0 LOWER EXTREMITY EDEMA: Primary | ICD-10-CM

## 2018-04-02 DIAGNOSIS — I50.9 ACUTE CONGESTIVE HEART FAILURE, UNSPECIFIED CONGESTIVE HEART FAILURE TYPE: ICD-10-CM

## 2018-04-02 DIAGNOSIS — J18.9 COMMUNITY ACQUIRED PNEUMONIA OF LEFT LUNG, UNSPECIFIED PART OF LUNG: ICD-10-CM

## 2018-04-02 DIAGNOSIS — I89.0 LYMPHEDEMA: ICD-10-CM

## 2018-04-02 DIAGNOSIS — R05.9 COUGH: ICD-10-CM
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
## 2018-04-02 DIAGNOSIS — R06.02 SHORTNESS OF BREATH: ICD-10-CM

## 2018-04-02 DIAGNOSIS — R07.89 LEFT-SIDED CHEST WALL PAIN: ICD-10-CM

## 2018-04-02 LAB
HCT VFR BLDA CALC: 46.4 %
HGB BLDA-MCNC: 15.1 G/DL
MCH, POC: 29
MCHC, POC: 32.6
MCV, POC: 88.8
PLATELET # BLD AUTO: 167 10*3/MM3
PMV BLD: 8.1 FL
RBC, POC: 5.23
RDW, POC: 16.7
WBC # BLD: 7.5 10*3/UL

## 2018-04-02 PROCEDURE — 85027 COMPLETE CBC AUTOMATED: CPT | Performed by: FAMILY MEDICINE

## 2018-04-02 PROCEDURE — 99214 OFFICE O/P EST MOD 30 MIN: CPT | Performed by: FAMILY MEDICINE

## 2018-04-02 PROCEDURE — 96372 THER/PROPH/DIAG INJ SC/IM: CPT | Performed by: FAMILY MEDICINE

## 2018-04-02 PROCEDURE — 94640 AIRWAY INHALATION TREATMENT: CPT | Performed by: FAMILY MEDICINE

## 2018-04-02 PROCEDURE — 71045 X-RAY EXAM CHEST 1 VIEW: CPT | Performed by: FAMILY MEDICINE

## 2018-04-02 RX ORDER — DOXYCYCLINE HYCLATE 100 MG/1
100 CAPSULE ORAL 2 TIMES DAILY
Qty: 20 CAPSULE | Refills: 0 | Status: SHIPPED | OUTPATIENT
Start: 2018-04-02 | End: 2018-04-24

## 2018-04-02 RX ORDER — FUROSEMIDE 80 MG
TABLET ORAL
Qty: 60 TABLET | Refills: 1 | Status: SHIPPED | OUTPATIENT
Start: 2018-04-02 | End: 2018-04-18 | Stop reason: ALTCHOICE

## 2018-04-02 RX ORDER — IPRATROPIUM BROMIDE AND ALBUTEROL SULFATE 2.5; .5 MG/3ML; MG/3ML
3 SOLUTION RESPIRATORY (INHALATION) ONCE
Status: COMPLETED | OUTPATIENT
Start: 2018-04-02 | End: 2018-04-02

## 2018-04-02 RX ADMIN — IPRATROPIUM BROMIDE AND ALBUTEROL SULFATE 3 ML: 2.5; .5 SOLUTION RESPIRATORY (INHALATION) at 16:33

## 2018-04-02 NOTE — PROGRESS NOTES
Subjective   Lisa Grant is a 89 y.o. female. Presents today for   Chief Complaint   Patient presents with   • Cellulitis     PT'S RIGHT LEG IS VERY PAINFUL AND HAS OPEN SORES.   • Fall     PT FELL ON 3/23 AND HER LEFT RIBS ARE STILL HURTING   • Shortness of Breath       Shortness of Breath   This is a recurrent problem. The current episode started in the past 7 days. The problem occurs constantly. The problem has been gradually worsening. Associated symptoms include chest pain (chest wall pain laterally wiht cough and deep breath;  shallow breaths no pain.  also fell and hit chest wall), coryza, leg swelling, orthopnea, PND and sputum production. Pertinent negatives include no abdominal pain, ear pain, fever, syncope, vomiting or wheezing. The symptoms are aggravated by any activity. Treatments tried: leg wraps, on diuretic;  tx for lung infection not too long ago;  Does have hx of diastolic chf and chronic leg swelling. The treatment provided mild relief. Her past medical history is significant for chronic lung disease and a heart failure.   Lower Extremity Issue   This is a chronic problem. The current episode started more than 1 month ago. The problem occurs constantly. Associated symptoms include chest pain (chest wall pain laterally wiht cough and deep breath;  shallow breaths no pain.  also fell and hit chest wall), chills, coughing and fatigue. Pertinent negatives include no abdominal pain, congestion, fever, nausea, urinary symptoms or vomiting. Associated symptoms comments: Wounds developing again;  Weeping legs;  Redness on right and skin peeling.  Healed well with unna wraps in past.  Has been home bound and not able to get here sooner.. Exacerbated by: feet being down;  trying to wrap. Treatments tried: lasix. The treatment provided mild relief.       Review of Systems   Constitutional: Positive for chills and fatigue. Negative for fever.   HENT: Negative for congestion, ear pain, postnasal drip, sinus  pain and sinus pressure.    Respiratory: Positive for cough, sputum production and shortness of breath. Negative for wheezing.    Cardiovascular: Positive for chest pain (chest wall pain laterally wiht cough and deep breath;  shallow breaths no pain.  also fell and hit chest wall), orthopnea, leg swelling and PND. Negative for palpitations and syncope.   Gastrointestinal: Negative for abdominal pain, diarrhea, nausea and vomiting.       The following portions of the patient's history were reviewed and updated as appropriate: allergies, current medications, past medical history and problem list.    Patient Active Problem List   Diagnosis   • Paroxysmal atrial fibrillation   • Angina pectoris   • Anxiety   • Atherosclerosis of aorta   • Coronary arteriosclerosis in native artery   • Benign essential hypertension   • Chronic kidney disease   • Claudication   • Congestive heart failure   • Constipation   • Type 2 diabetes mellitus with diabetic nephropathy   • Dry skin dermatitis   • Dyslipidemia   • Gastroesophageal reflux disease with esophagitis   • Glaucoma   • Hypothyroidism   • Knee pain   • Lymphedema   • Onychomycosis   • Primary insomnia   • Tinea pedis   • Vitamin D deficiency       Allergies   Allergen Reactions   • Codeine    • Hydrocodone-Acetaminophen    • Metoclopramide    • Oxycodone    • Propoxyphene        Current Outpatient Prescriptions on File Prior to Visit   Medication Sig Dispense Refill   • alendronate (FOSAMAX) 70 MG tablet TAKE ONE TABLET BY MOUTH ONCE A WEEK 12 tablet 1   • aspirin 81 MG chewable tablet Chew 81 mg Daily.     • CALCIUM PO Take  by mouth.     • clopidogrel (PLAVIX) 75 MG tablet Take 75 mg by mouth Daily.     • diazePAM (VALIUM) 5 MG tablet After 10mg valium start 1.5 tab 5mg valium nightly x 14 days, then 1 valium nighly x 14 days then see doctor for further weaning. 35 tablet 0   • gabapentin (NEURONTIN) 100 MG capsule 1 po nightly x 14 days, then 1 po twice daily for back  pain 60 capsule 2   • levothyroxine (SYNTHROID, LEVOTHROID) 75 MCG tablet TAKE ONE TABLET BY MOUTH ONCE DAILY 30 tablet 6   • liothyronine (CYTOMEL) 5 MCG tablet Take 1.5 tablets by mouth daily 45 tablet 5   • losartan (COZAAR) 100 MG tablet Take 100 mg by mouth Daily.     • metoprolol succinate XL (TOPROL-XL) 100 MG 24 hr tablet Take 1 tablet by mouth Daily. 30 tablet 5   • mupirocin (BACTROBAN) 2 % ointment Apply  topically 2 (Two) Times a Day. 30 g 1   • nitroglycerin (NITROLINGUAL) 0.4 MG/SPRAY spray Nitroglycerin 0.4 MG/SPRAY Translingual Solution; Patient Sig: Nitroglycerin 0.4 MG/SPRAY Translingual Solution USE AS DIRECTED.; 0; 11-Sep-2013; Active     • pantoprazole (PROTONIX) 40 MG EC tablet Take 40 mg by mouth Daily.     • Polyethylene Glycol 3350 granules Take  by mouth daily.     • potassium chloride ER (K-TAB) 20 MEQ tablet controlled-release ER tablet Take 1 tablet by mouth Daily. 60 tablet 6   • pravastatin (PRAVACHOL) 20 MG tablet Take 1 tablet by mouth Daily. 30 tablet 5   • traZODone (DESYREL) 50 MG tablet Take 1 tablet by mouth Every Night. 30 tablet 2   • urea (CARMOL) 40 % cream Apply  topically Daily. To lower extremities and feet 198 g 5   • [DISCONTINUED] doxycycline (VIBRAMYCIN) 100 MG capsule Take 1 capsule by mouth 2 (Two) Times a Day. 20 capsule 0   • [DISCONTINUED] furosemide (LASIX) 40 MG tablet 2 po daily x 7 days, then 1 po daily 30 tablet 6     No current facility-administered medications on file prior to visit.        Objective   Vitals:    04/02/18 1513 04/02/18 1644   BP: 90/58    BP Location: Right arm    Patient Position: Sitting    Cuff Size: Large Adult    Pulse: 68    Temp: 97.4 °F (36.3 °C)    TempSrc: Oral    SpO2: (!) 86% 90%   Weight: Comment: PT IN WHEELCHAIR, UNABLE TO WEIGH.    PainSc:  Comment: ROOM AIR       Physical Exam   Constitutional: She appears well-developed and well-nourished.   HENT:   Head: Normocephalic and atraumatic.   Right Ear: External ear normal.    Left Ear: External ear normal.   Nose: Nose normal.   Mouth/Throat: Oropharynx is clear and moist.   Neck: Neck supple. No JVD present. No thyromegaly present.   Cardiovascular: Normal rate, regular rhythm and normal heart sounds.  Exam reveals no gallop and no friction rub.    No murmur heard.  Pulmonary/Chest: Effort normal. No accessory muscle usage. No tachypnea. No respiratory distress. She has decreased breath sounds. She has no wheezes. She has rales.   Abdominal: Soft. Bowel sounds are normal. She exhibits no distension. There is no tenderness. There is no rebound and no guarding.   Musculoskeletal: She exhibits edema.   Neurological: She is alert.   Skin: Skin is warm and dry.   Lower extremity weeping, areas of skin thikening again;  Superficial ulcerations right > left;  Some redness to right, no tenderness or calf pain;       Applied silvadene, bacitracin and wrapped in sterile kerlix with overlying 6 inch ace wrap with 50% overalp with compression.  Cap refill toe after wrap <3sec.   Psychiatric: She has a normal mood and affect. Her behavior is normal.   Nursing note and vitals reviewed.    XRAY: cxr pa & lateral  INDICATION:  Cough, dyspnea  Wet read:  Pulmonary vascular congestion, ? Superimposed pna on left  No Comparative data  Imagine independently viewed by myself  Radiology reading pending.    POC CBC   Order: [id number]   Status:  Final result   Visible to patient:  No (Not Released) Dx:  Lower extremity edema; Shortness of b...    Ref Range & Units 9d ago   Hematocrit % 46.4    Hemoglobin g/dL 15.1    RBC  5.23    WBC  7.5    MCV  88.8    MCH  29.0    MCHC  32.6    RDW-CV  16.7    Platelets 10*3/mm3 167    MPV  8.1    Resulting Agency  [facility]      Specimen Collected: 04/02/18 16:30 Last Resulted: 04/02/18 16:30                  Assessment/Plan   Lisa was seen today for cellulitis, fall and shortness of breath.    Diagnoses and all orders for this visit:    Lower  extremity edema  -     Cancel: XR Chest AP (In Office)  -     POC CBC  -     Basic Metabolic Panel  -     BNP  -     furosemide (LASIX) 80 MG tablet; 1 PO AM AND NOON EVERY DAY FOR 7 DAYS THEN 1AM AND 0.5 PM NOON    Left-sided chest wall pain  -     Cancel: XR Chest AP (In Office)  -     POC CBC  -     Basic Metabolic Panel  -     XR Chest AP (In Office)    Fall, initial encounter  -     Cancel: XR Chest AP (In Office)  -     POC CBC  -     Basic Metabolic Panel  -     XR Chest AP (In Office)    Cough  -     Cancel: XR Chest AP (In Office)  -     POC CBC  -     Basic Metabolic Panel  -     XR Chest AP (In Office)  -     ipratropium-albuterol (DUO-NEB) nebulizer solution 3 mL; Take 3 mL by nebulization 1 (One) Time.    Shortness of breath  -     Cancel: XR Chest AP (In Office)  -     POC CBC  -     Basic Metabolic Panel  -     XR Chest AP (In Office)  -     ipratropium-albuterol (DUO-NEB) nebulizer solution 3 mL; Take 3 mL by nebulization 1 (One) Time.  -     BNP    Lymphedema    Community acquired pneumonia of left lung, unspecified part of lung  -     cefTRIAXone (ROCEPHIN) 1 g in lidocaine PF 1% (XYLOCAINE) IM only syringe; Inject 4 mL into the shoulder, thigh, or buttocks 1 (One) Time.    Cellulitis of right lower extremity  -     doxycycline (VIBRAMYCIN) 100 MG capsule; Take 1 capsule by mouth 2 (Two) Times a Day.    Acute congestive heart failure, unspecified congestive heart failure type    -dyspnea - diff dx of chf, pna or acute bronchitis.  Will check BNP, also increase furosemide.  Possile early cellulitis, will give doxy that will cover along with cap;  Gave Rocephin as noted above;  If develops resp distress, go ER;  Chest wall pain, no fxs noted, tylenol prn pain  -legs continue wraps, will have VNA evaluate and assist patient;   Important to compress to facilitate healing.           -Follow up: 2 weeks       Current Outpatient Prescriptions:   •  alendronate (FOSAMAX) 70 MG tablet, TAKE ONE TABLET BY  MOUTH ONCE A WEEK, Disp: 12 tablet, Rfl: 1  •  aspirin 81 MG chewable tablet, Chew 81 mg Daily., Disp: , Rfl:   •  CALCIUM PO, Take  by mouth., Disp: , Rfl:   •  clopidogrel (PLAVIX) 75 MG tablet, Take 75 mg by mouth Daily., Disp: , Rfl:   •  diazePAM (VALIUM) 5 MG tablet, After 10mg valium start 1.5 tab 5mg valium nightly x 14 days, then 1 valium nighly x 14 days then see doctor for further weaning., Disp: 35 tablet, Rfl: 0  •  doxycycline (VIBRAMYCIN) 100 MG capsule, Take 1 capsule by mouth 2 (Two) Times a Day., Disp: 20 capsule, Rfl: 0  •  furosemide (LASIX) 80 MG tablet, 1 PO AM AND NOON EVERY DAY FOR 7 DAYS THEN 1AM AND 0.5 PM NOON, Disp: 60 tablet, Rfl: 1  •  gabapentin (NEURONTIN) 100 MG capsule, 1 po nightly x 14 days, then 1 po twice daily for back pain, Disp: 60 capsule, Rfl: 2  •  levothyroxine (SYNTHROID, LEVOTHROID) 75 MCG tablet, TAKE ONE TABLET BY MOUTH ONCE DAILY, Disp: 30 tablet, Rfl: 6  •  liothyronine (CYTOMEL) 5 MCG tablet, Take 1.5 tablets by mouth daily, Disp: 45 tablet, Rfl: 5  •  losartan (COZAAR) 100 MG tablet, Take 100 mg by mouth Daily., Disp: , Rfl:   •  metoprolol succinate XL (TOPROL-XL) 100 MG 24 hr tablet, Take 1 tablet by mouth Daily., Disp: 30 tablet, Rfl: 5  •  mupirocin (BACTROBAN) 2 % ointment, Apply  topically 2 (Two) Times a Day., Disp: 30 g, Rfl: 1  •  nitroglycerin (NITROLINGUAL) 0.4 MG/SPRAY spray, Nitroglycerin 0.4 MG/SPRAY Translingual Solution; Patient Sig: Nitroglycerin 0.4 MG/SPRAY Translingual Solution USE AS DIRECTED.; 0; 11-Sep-2013; Active, Disp: , Rfl:   •  pantoprazole (PROTONIX) 40 MG EC tablet, Take 40 mg by mouth Daily., Disp: , Rfl:   •  Polyethylene Glycol 3350 granules, Take  by mouth daily., Disp: , Rfl:   •  potassium chloride ER (K-TAB) 20 MEQ tablet controlled-release ER tablet, Take 1 tablet by mouth Daily., Disp: 60 tablet, Rfl: 6  •  pravastatin (PRAVACHOL) 20 MG tablet, Take 1 tablet by mouth Daily., Disp: 30 tablet, Rfl: 5  •  traZODone (DESYREL) 50  MG tablet, Take 1 tablet by mouth Every Night., Disp: 30 tablet, Rfl: 2  •  urea (CARMOL) 40 % cream, Apply  topically Daily. To lower extremities and feet, Disp: 198 g, Rfl: 5  No current facility-administered medications for this visit.

## 2018-04-03 LAB
BNP SERPL-MCNC: 1167.7 PG/ML (ref 0–100)
BUN SERPL-MCNC: 33 MG/DL (ref 10–36)
BUN/CREAT SERPL: 22 (ref 12–28)
CALCIUM SERPL-MCNC: 8.4 MG/DL (ref 8.7–10.3)
CHLORIDE SERPL-SCNC: 99 MMOL/L (ref 96–106)
CO2 SERPL-SCNC: 21 MMOL/L (ref 18–29)
CREAT SERPL-MCNC: 1.49 MG/DL (ref 0.57–1)
GFR SERPLBLD CREATININE-BSD FMLA CKD-EPI: 31 ML/MIN/1.73
GFR SERPLBLD CREATININE-BSD FMLA CKD-EPI: 35 ML/MIN/1.73
GLUCOSE SERPL-MCNC: 102 MG/DL (ref 65–99)
POTASSIUM SERPL-SCNC: 4.1 MMOL/L (ref 3.5–5.2)
SODIUM SERPL-SCNC: 140 MMOL/L (ref 134–144)

## 2018-04-04 ENCOUNTER — TELEPHONE (OUTPATIENT)
Dept: FAMILY MEDICINE CLINIC | Facility: CLINIC | Age: 83
End: 2018-04-04

## 2018-04-05 NOTE — PROGRESS NOTES
Please call the patient regarding her abnormal result.  Patient does have congestive heart failure on cxr, possible pneumonia as well.  Marker for heart failure is elevated.  Kidney function has declined, suspect 2ndary to heart failure.  Is patient feeling any better?

## 2018-04-09 RX ORDER — SPIRONOLACTONE 25 MG/1
25 TABLET ORAL 2 TIMES DAILY
Qty: 60 TABLET | Refills: 0 | Status: SHIPPED | OUTPATIENT
Start: 2018-04-09 | End: 2018-04-24

## 2018-04-10 RX ORDER — SPIRONOLACTONE 25 MG/1
25 TABLET ORAL 2 TIMES DAILY
Qty: 60 TABLET | Refills: 0 | Status: SHIPPED | OUTPATIENT
Start: 2018-04-10 | End: 2018-04-24

## 2018-04-17 ENCOUNTER — TELEPHONE (OUTPATIENT)
Dept: FAMILY MEDICINE CLINIC | Facility: CLINIC | Age: 83
End: 2018-04-17

## 2018-04-17 NOTE — TELEPHONE ENCOUNTER
Have home health check BMP and BNP while there dx diastolic chf.  Stop lasix and start bumex 1mg po bid and take with spironolactone bid.

## 2018-04-18 RX ORDER — BUMETANIDE 1 MG/1
1 TABLET ORAL DAILY
Qty: 60 TABLET | Refills: 1 | Status: SHIPPED | OUTPATIENT
Start: 2018-04-18 | End: 2018-09-18 | Stop reason: SDUPTHER

## 2018-04-19 ENCOUNTER — TELEPHONE (OUTPATIENT)
Dept: FAMILY MEDICINE CLINIC | Facility: CLINIC | Age: 83
End: 2018-04-19

## 2018-04-19 NOTE — TELEPHONE ENCOUNTER
Sonia,    Received BMP back via [facility];  Ms. Grant is going into renal failure and progressively worsening.  We need to stop spironolactone and take bumex 1mg 1/2 am only.  If any shortness of breath, continued weeping of legs, I would recommend go ER for further evaluation of renal failure and congestive heart failure.     If doesn't want to go, I think they should come to start having end of life discussions and how to focus on quality of life.  Otherwise, need to get her to cardiology and nephrology if wants aggressive care.    ABDULAZIZ Major

## 2018-04-19 NOTE — TELEPHONE ENCOUNTER
Patient could not find a ride to the hospital. I asked her to call 911, and she said someone is delivering a ramp to her house tomorrow, so she does not really want to go to the hospital.  She still does not have the bumex that was sent yesterday. I told her not to take the spironolactone and to take 40 mg of furosemide each morning until she is able to  the bumex, and once she gets the bumex, d/c the furosemide and start 1/2 bumex each morning.   She said she is no longer soa, but her right leg is still hurting. I told her if she becomes soa again, or if her legs get any worse that she must call 911 to get to the hospital.  I explained to her that her labs were very worrisome and it is likely that she will be soa again because cutting down on the diuretics to save her kidneys will allow the fluid to build back up around her heart and she most likely will have to call 911 and go to the hospital.

## 2018-04-19 NOTE — TELEPHONE ENCOUNTER
Pt informed to go to hospital. She is trying to find a ride. She will call me back if she can not find a ride.

## 2018-04-24 ENCOUNTER — OFFICE VISIT (OUTPATIENT)
Dept: FAMILY MEDICINE CLINIC | Facility: CLINIC | Age: 83
End: 2018-04-24

## 2018-04-24 VITALS — HEART RATE: 67 BPM | OXYGEN SATURATION: 98 % | DIASTOLIC BLOOD PRESSURE: 36 MMHG | SYSTOLIC BLOOD PRESSURE: 82 MMHG

## 2018-04-24 DIAGNOSIS — I25.10 CORONARY ARTERIOSCLEROSIS IN NATIVE ARTERY: ICD-10-CM

## 2018-04-24 DIAGNOSIS — N17.9 ACUTE KIDNEY INJURY SUPERIMPOSED ON CKD (HCC): Primary | ICD-10-CM

## 2018-04-24 DIAGNOSIS — I87.2 VENOUS INSUFFICIENCY OF BOTH LOWER EXTREMITIES: ICD-10-CM

## 2018-04-24 DIAGNOSIS — I50.9 ACUTE CONGESTIVE HEART FAILURE, UNSPECIFIED CONGESTIVE HEART FAILURE TYPE: ICD-10-CM

## 2018-04-24 DIAGNOSIS — I95.89 OTHER SPECIFIED HYPOTENSION: ICD-10-CM

## 2018-04-24 DIAGNOSIS — L98.491 SKIN ULCER, LIMITED TO BREAKDOWN OF SKIN (HCC): ICD-10-CM

## 2018-04-24 DIAGNOSIS — I89.0 LYMPHEDEMA: ICD-10-CM

## 2018-04-24 DIAGNOSIS — N18.30 CKD (CHRONIC KIDNEY DISEASE), STAGE III (HCC): ICD-10-CM

## 2018-04-24 DIAGNOSIS — N18.9 ACUTE KIDNEY INJURY SUPERIMPOSED ON CKD (HCC): Primary | ICD-10-CM

## 2018-04-24 PROCEDURE — 99214 OFFICE O/P EST MOD 30 MIN: CPT | Performed by: FAMILY MEDICINE

## 2018-04-24 NOTE — PROGRESS NOTES
Subjective   Lisa Grant is a 89 y.o. female. Presents today for   Chief Complaint   Patient presents with   • Edema     pt here for 2 week follow up   • Fatigue     pt said she is very tired       Congestive Heart Failure   Presents for initial visit. The disease course has been fluctuating. Associated symptoms include edema and fatigue. Pertinent negatives include no chest pain, chest pressure, orthopnea, paroxysmal nocturnal dyspnea or shortness of breath. Symptom course: breathing better, but legs still swelling and very painful. The treatment provided mild relief. Compliance with prior treatments has been good. Her past medical history is significant for CAD. There is no history of DVT or PE.   Chronic Kidney Disease   This is a new problem. The current episode started 1 to 4 weeks ago. The problem occurs constantly. The problem has been gradually worsening. Associated symptoms include fatigue. Pertinent negatives include no chest pain. Exacerbated by: have been aggressively diuresing, had called to cut back diuretics as renal function progressively worsening.  ? taking NSAIDs, d/w to avoid.  Tylenol only. Treatments tried: Due recheck today, has cut back on bumex to 1mg daily. The treatment provided mild relief.     Last BMP was sent to us last week noting progressive renal decline, Cr at 2.0, week prior 1.5    Review of Systems   Constitutional: Positive for fatigue.   Respiratory: Negative for shortness of breath.    Cardiovascular: Negative for chest pain.       The following portions of the patient's history were reviewed and updated as appropriate: allergies, current medications, past medical history and problem list.    Patient Active Problem List   Diagnosis   • Paroxysmal atrial fibrillation   • Angina pectoris   • Anxiety   • Atherosclerosis of aorta   • Coronary arteriosclerosis in native artery   • Benign essential hypertension   • Chronic kidney disease   • Claudication   • Congestive heart  failure   • Constipation   • Type 2 diabetes mellitus with diabetic nephropathy   • Dry skin dermatitis   • Dyslipidemia   • Gastroesophageal reflux disease with esophagitis   • Glaucoma   • Hypothyroidism   • Knee pain   • Lymphedema   • Onychomycosis   • Primary insomnia   • Tinea pedis   • Vitamin D deficiency       Allergies   Allergen Reactions   • Codeine    • Hydrocodone-Acetaminophen    • Metoclopramide    • Oxycodone    • Propoxyphene        Current Outpatient Prescriptions on File Prior to Visit   Medication Sig Dispense Refill   • alendronate (FOSAMAX) 70 MG tablet TAKE ONE TABLET BY MOUTH ONCE A WEEK 12 tablet 1   • aspirin 81 MG chewable tablet Chew 81 mg Daily.     • bumetanide (BUMEX) 1 MG tablet Take 1 tablet by mouth Daily. (Patient taking differently: Take 0.5 mg by mouth Daily.) 60 tablet 1   • CALCIUM PO Take  by mouth.     • clopidogrel (PLAVIX) 75 MG tablet Take 75 mg by mouth Daily.     • diazePAM (VALIUM) 5 MG tablet After 10mg valium start 1.5 tab 5mg valium nightly x 14 days, then 1 valium nighly x 14 days then see doctor for further weaning. 35 tablet 0   • gabapentin (NEURONTIN) 100 MG capsule 1 po nightly x 14 days, then 1 po twice daily for back pain 60 capsule 2   • levothyroxine (SYNTHROID, LEVOTHROID) 75 MCG tablet TAKE ONE TABLET BY MOUTH ONCE DAILY 30 tablet 6   • liothyronine (CYTOMEL) 5 MCG tablet Take 1.5 tablets by mouth daily 45 tablet 5   • losartan (COZAAR) 100 MG tablet Take 100 mg by mouth Daily.     • metoprolol succinate XL (TOPROL-XL) 100 MG 24 hr tablet Take 1 tablet by mouth Daily. 30 tablet 5   • mupirocin (BACTROBAN) 2 % ointment Apply  topically 2 (Two) Times a Day. 30 g 1   • nitroglycerin (NITROLINGUAL) 0.4 MG/SPRAY spray Nitroglycerin 0.4 MG/SPRAY Translingual Solution; Patient Sig: Nitroglycerin 0.4 MG/SPRAY Translingual Solution USE AS DIRECTED.; 0; 11-Sep-2013; Active     • pantoprazole (PROTONIX) 40 MG EC tablet Take 40 mg by mouth Daily.     • Polyethylene  Glycol 3350 granules Take  by mouth daily.     • pravastatin (PRAVACHOL) 20 MG tablet Take 1 tablet by mouth Daily. 30 tablet 5   • spironolactone (ALDACTONE) 25 MG tablet Take 1 tablet by mouth 2 (Two) Times a Day. 60 tablet 0   • traZODone (DESYREL) 50 MG tablet Take 1 tablet by mouth Every Night. 30 tablet 2   • urea (CARMOL) 40 % cream Apply  topically Daily. To lower extremities and feet 198 g 5   • [DISCONTINUED] doxycycline (VIBRAMYCIN) 100 MG capsule Take 1 capsule by mouth 2 (Two) Times a Day. 20 capsule 0   • [DISCONTINUED] spironolactone (ALDACTONE) 25 MG tablet Take 1 tablet by mouth 2 (Two) Times a Day. 60 tablet 0     No current facility-administered medications on file prior to visit.        Objective   Vitals:    04/24/18 1509   BP: (!) 82/36   BP Location: Left arm   Patient Position: Sitting   Cuff Size: Adult   Pulse: 67   SpO2: 98%   Weight: Comment: pt in wheelchair, unable to weigh       Physical Exam   Constitutional: She appears well-developed and well-nourished.   HENT:   Head: Normocephalic and atraumatic.   Neck: Neck supple. No JVD present. No thyromegaly present.   Cardiovascular: Normal rate, regular rhythm and normal heart sounds.  Exam reveals no gallop and no friction rub.    No murmur heard.  Pulmonary/Chest: Effort normal. No respiratory distress. She has decreased breath sounds. She has no wheezes. She has rales (faint bibasilar crackles).   Abdominal: Soft. Bowel sounds are normal. She exhibits no distension. There is no tenderness. There is no rebound and no guarding.   Musculoskeletal: She exhibits edema.   Neurological: She is alert.   Skin: Skin is warm and dry. Capillary refill takes less than 2 seconds.   Left leg edema, thickened skin.  Crm and unna boot.  Right lower extremiety with extensive ulceration, weeping.  +redness but no warmth.  Applied ointmnt and unna boot.  Over wrapped both legs with 6 inch ace wrap with 50% overlap to above knee;  Cap ref < 3sec.    Psychiatric: She has a normal mood and affect. Her behavior is normal.   Nursing note and vitals reviewed.      Assessment/Plan   Lisa was seen today for edema and fatigue.    Diagnoses and all orders for this visit:    Acute kidney injury superimposed on CKD  -     Ambulatory Referral to Nephrology  -     US Renal Bilateral; Future  -     Basic Metabolic Panel    Coronary arteriosclerosis in native artery    Acute congestive heart failure, unspecified congestive heart failure type    Lymphedema  -     Ambulatory Referral to Wound Clinic    CKD (chronic kidney disease), stage III  -     US Renal Bilateral; Future  -     Sedimentation Rate  -     CBC & Differential    Skin ulcer, limited to breakdown of skin  -     Ambulatory Referral to Wound Clinic  -     Sedimentation Rate  -     CBC & Differential    Venous insufficiency of both lower extremities  -     Ambulatory Referral to Wound Clinic    Other specified hypotension        1) AVOID ALL NSAIDS like ibuprofen or alleve.  Tylenol or it's generic acetominophen is okay for pain only.  2) Your kidney function has declined, continue just bumex 1mg in am for now.  3) Your blood pressure is too low - stop losartan for now.  4) I am referring you to a kidney specialist because your kidney function has declined progressively.  I have also ordered an ultrasound to look at your kidneys.  5) On your recent chest xray, it looked like you were in volume overload.  Will continue Bumex.  Your oxygen level today was better.  6) Your right leg is developing ulcerations, I am sending you to a wound care specialist.  Please leave your wraps on until I see you back in 6 days or see them.  You may loosen the ACE wraps if they become too tight.  7) If your breathing worsens again or feeling worse, go to ER immediately.    Discussed with patient results and okay with referral to nephrology, she is unclear on future treatment if ever needed dialysis.  Will follow closely.        -Follow up: 6 days       Current Outpatient Prescriptions:   •  alendronate (FOSAMAX) 70 MG tablet, TAKE ONE TABLET BY MOUTH ONCE A WEEK, Disp: 12 tablet, Rfl: 1  •  aspirin 81 MG chewable tablet, Chew 81 mg Daily., Disp: , Rfl:   •  bumetanide (BUMEX) 1 MG tablet, Take 1 tablet by mouth Daily. (Patient taking differently: Take 0.5 mg by mouth Daily.), Disp: 60 tablet, Rfl: 1  •  CALCIUM PO, Take  by mouth., Disp: , Rfl:   •  clopidogrel (PLAVIX) 75 MG tablet, Take 75 mg by mouth Daily., Disp: , Rfl:   •  diazePAM (VALIUM) 5 MG tablet, After 10mg valium start 1.5 tab 5mg valium nightly x 14 days, then 1 valium nighly x 14 days then see doctor for further weaning., Disp: 35 tablet, Rfl: 0  •  gabapentin (NEURONTIN) 100 MG capsule, 1 po nightly x 14 days, then 1 po twice daily for back pain, Disp: 60 capsule, Rfl: 2  •  levothyroxine (SYNTHROID, LEVOTHROID) 75 MCG tablet, TAKE ONE TABLET BY MOUTH ONCE DAILY, Disp: 30 tablet, Rfl: 6  •  liothyronine (CYTOMEL) 5 MCG tablet, Take 1.5 tablets by mouth daily, Disp: 45 tablet, Rfl: 5  •  losartan (COZAAR) 100 MG tablet, Take 100 mg by mouth Daily., Disp: , Rfl:   •  metoprolol succinate XL (TOPROL-XL) 100 MG 24 hr tablet, Take 1 tablet by mouth Daily., Disp: 30 tablet, Rfl: 5  •  mupirocin (BACTROBAN) 2 % ointment, Apply  topically 2 (Two) Times a Day., Disp: 30 g, Rfl: 1  •  nitroglycerin (NITROLINGUAL) 0.4 MG/SPRAY spray, Nitroglycerin 0.4 MG/SPRAY Translingual Solution; Patient Sig: Nitroglycerin 0.4 MG/SPRAY Translingual Solution USE AS DIRECTED.; 0; 11-Sep-2013; Active, Disp: , Rfl:   •  pantoprazole (PROTONIX) 40 MG EC tablet, Take 40 mg by mouth Daily., Disp: , Rfl:   •  Polyethylene Glycol 3350 granules, Take  by mouth daily., Disp: , Rfl:   •  pravastatin (PRAVACHOL) 20 MG tablet, Take 1 tablet by mouth Daily., Disp: 30 tablet, Rfl: 5  •  spironolactone (ALDACTONE) 25 MG tablet, Take 1 tablet by mouth 2 (Two) Times a Day., Disp: 60 tablet, Rfl: 0  •  traZODone  (DESYREL) 50 MG tablet, Take 1 tablet by mouth Every Night., Disp: 30 tablet, Rfl: 2  •  urea (CARMOL) 40 % cream, Apply  topically Daily. To lower extremities and feet, Disp: 198 g, Rfl: 5

## 2018-04-24 NOTE — PATIENT INSTRUCTIONS
1) AVOID ALL NSAIDS like ibuprofen or alleve.  Tylenol or it's generic acetominophen is okay for pain only.  2) Your kidney function has declined, continue just bumex 1mg in am for now.  3) Your blood pressure is too low - stop losartan for now.  4) I am referring you to a kidney specialist because your kidney function has declined progressively.  I have also ordered an ultrasound to look at your kidneys.  5) On your recent chest xray, it looked like you were in volume overload.  Will continue Bumex.  Your oxygen level today was better.  6) Your right leg is developing ulcerations, I am sending you to a wound care specialist.  Please leave your wraps on until I see you back in 6 days or see them.  You may loosen the ACE wraps if they become too tight.  7) If your breathing worsens again or feeling worse, go to ER immediately.      Heart Failure  Heart failure means your heart has trouble pumping blood. This makes it hard for your body to work well. Heart failure is usually a long-term (chronic) condition. You must take good care of yourself and follow your doctor's treatment plan.  Follow these instructions at home:  · Take your heart medicine as told by your doctor.  ¨ Do not stop taking medicine unless your doctor tells you to.  ¨ Do not skip any dose of medicine.  ¨ Refill your medicines before they run out.  ¨ Take other medicines only as told by your doctor or pharmacist.  · Stay active if told by your doctor. The elderly and people with severe heart failure should talk with a doctor about physical activity.  · Eat heart-healthy foods. Choose foods that are without trans fat and are low in saturated fat, cholesterol, and salt (sodium). This includes fresh or frozen fruits and vegetables, fish, lean meats, fat-free or low-fat dairy foods, whole grains, and high-fiber foods. Lentils and dried peas and beans (legumes) are also good choices.  · Limit salt if told by your doctor.  · Cook in a healthy way. Roast,  grill, broil, bake, poach, steam, or stir-mendiola foods.  · Limit fluids as told by your doctor.  · Weigh yourself every morning. Do this after you pee (urinate) and before you eat breakfast. Write down your weight to give to your doctor.  · Take your blood pressure and write it down if your doctor tells you to.  · Ask your doctor how to check your pulse. Check your pulse as told.  · Lose weight if told by your doctor.  · Stop smoking or chewing tobacco. Do not use gum or patches that help you quit without your doctor's approval.  · Schedule and go to doctor visits as told.  · Nonpregnant women should have no more than 1 drink a day. Men should have no more than 2 drinks a day. Talk to your doctor about drinking alcohol.  · Stop illegal drug use.  · Stay current with shots (immunizations).  · Manage your health conditions as told by your doctor.  · Learn to manage your stress.  · Rest when you are tired.  · If it is really hot outside:  ¨ Avoid intense activities.  ¨ Use air conditioning or fans, or get in a cooler place.  ¨ Avoid caffeine and alcohol.  ¨ Wear loose-fitting, lightweight, and light-colored clothing.  · If it is really cold outside:  ¨ Avoid intense activities.  ¨ Layer your clothing.  ¨ Wear mittens or gloves, a hat, and a scarf when going outside.  ¨ Avoid alcohol.  · Learn about heart failure and get support as needed.  · Get help to maintain or improve your quality of life and your ability to care for yourself as needed.  Contact a doctor if:  · You gain weight quickly.  · You are more short of breath than usual.  · You cannot do your normal activities.  · You tire easily.  · You cough more than normal, especially with activity.  · You have any or more puffiness (swelling) in areas such as your hands, feet, ankles, or belly (abdomen).  · You cannot sleep because it is hard to breathe.  · You feel like your heart is beating fast (palpitations).  · You get dizzy or light-headed when you stand up.  Get  help right away if:  · You have trouble breathing.  · There is a change in mental status, such as becoming less alert or not being able to focus.  · You have chest pain or discomfort.  · You faint.  This information is not intended to replace advice given to you by your health care provider. Make sure you discuss any questions you have with your health care provider.  Document Released: 09/26/2009 Document Revised: 05/25/2017 Document Reviewed: 02/03/2014  [employer] Interactive Patient Education © 2017 [employer]    Chronic Kidney Disease, Adult  Chronic kidney disease (CKD) occurs when the kidneys are damaged during a period of 3 or more months. The kidneys are two organs that do many important jobs in the body, which include:  · Removing wastes and extra fluids from the blood.  · Making hormones that maintain the amount of fluid in your tissues and blood vessels.  · Maintaining the right amount of fluids and chemicals in the body.  A small amount of kidney damage may not cause problems, but a large amount of damage may make it difficult or impossible for the kidneys to work the way they should. If steps are not taken to slow down the kidney damage or stop it from getting worse, the kidneys may stop working permanently (end stage kidney disease). Most of the time, CKD does not go away, but it can often be controlled. People who have CKD can usually live normal lives.  What are the causes?  The most common causes of this condition are diabetes and high blood pressure (hypertension). Other causes include:  · Heart and blood vessel (cardiovascular) disease.  · Kidney diseases:  ¨ Glomerulonephritis.  ¨ Interstitial nephritis.  ¨ Polycystic kidney disease.  ¨ Renal vascular disease.  · Diseases that affect the immune system.  · Genetic diseases.  · Medicines that damage the kidneys, such as anti-inflammatory medicines.  · Poisoning.  · Being around or in contact with poisonous (toxic) substances.  · A kidney or  urinary infection that occurs again (recurs).  · Vasculitis.  · Repeat kidney infections.  · A problem with urine flow that may be caused by:  ¨ Cancer.  ¨ Having kidney stones more than one time.  ¨ An enlarged prostate in males.  What increases the risk?  This condition is more likely to develop in people who are:  · Older than age 60.  · Female.  · Of -American descent.  · Current smokers or former smokers.  · Obese.  You may also have an increased risk for CKD if you have a family history of CKD or you frequently take medicines that are damaging to the kidneys.  What are the signs or symptoms?  Symptoms develop slowly and may not be obvious until the kidney damage becomes severe. It is possible to have a kidney disease for years without showing any symptoms. Symptoms of this condition can include:  · Swelling (edema) of the face, legs, ankles, or feet.  · Numbness, tingling, or loss of feeling (sensation) in the hands or feet.  · Tiredness (lethargy).  · Nausea or vomiting.  · Confusion or trouble concentrating.  · Problems with urination, such as:  ¨ Painful or burning feeling during urination.  ¨ Decreased urine production.  ¨ Frequent urination, especially at night.  ¨ Bloody urine.  · Muscle twitches and cramps, especially in the legs.  · Shortness of breath.  · Weakness.  · Constant itchiness.  · Loss of appetite.  · Metallic taste in the mouth.  · Trouble sleeping.  · Pale lining of the eyelids and surface of the eye (conjunctiva).  How is this diagnosed?  This condition may be diagnosed with various tests. Tests may include:  · Blood tests.  · Urine tests.  · Imaging tests.  · A test in which a sample of tissue is removed from the kidneys to be looked at under a microscope (kidney biopsy).  These test results will help your health care provider determine what class of CKD you have.  How is this treated?  Most cases of CKD cannot be cured. Treatment usually involves relieving symptoms and preventing  or slowing the progression of the disease. Treatment may include:  · A special diet, which may require you to avoid alcohol, salty foods (sodium), and foods that are high in potassium, calcium, and protein.  · Medicines:  ¨ To lower blood pressure.  ¨ To relieve low blood count (anemia).  ¨ To relieve swelling.  ¨ To protect your bones.  ¨ To improve the balance of electrolytes in your blood.  · Removing toxic waste from the body by using hemodialysis or peritoneal dialysis if the kidneys can no longer do their job (kidney failure).  · Management of other conditions that are causing your CKD or making it worse.  Follow these instructions at home:  · Follow your prescribed diet.  · Take over-the-counter and prescription medicines only as told by your health care provider.  ¨ Do not take any new medicines unless approved by your health care provider. Many medicines can worsen your kidney damage.  ¨ Do not take any vitamin and mineral supplements unless approved by your health care provider. Many nutritional supplements can worsen your kidney damage.  ¨ The dose of some medicines that you take may need to be adjusted.  · Do not use any tobacco products, such as cigarettes, chewing tobacco, and e-cigarettes. If you need help quitting, ask your health care provider.  · Keep all follow-up visits as told by your health care provider. This is important.  · Keep track of your blood pressure. Report changes in your blood pressure as told by your health care provider.  · Achieve and maintain a healthy weight. If you need help with this, ask your health care provider.  · Start or continue an exercise plan. Try to exercise at least 30 minutes a day, 5 days a week.  · Stay current with immunizations as told by your health care provider.  Where to find more information:  · American Association of Kidney Patients: www.aakp.org  · National Kidney Foundation: www.kidney.org  · [employer]: www.akfinc.org  · [employer]  Rehabilitation Program: www.lifeoptions.org and www.kidneyschool.org  Contact a health care provider if:  · Your symptoms get worse.  · You develop new symptoms.  Get help right away if:  · You develop symptoms of end-stage kidney disease, which include:  ¨ Headaches.  ¨ Abnormally dark or light skin.  ¨ Numbness in the hands or feet.  ¨ Easy bruising.  ¨ Frequent hiccups.  ¨ Chest pain.  ¨ Shortness of breath.  ¨ End of menstruation in women.  · You have a fever.  · You have decreased urine production.  · You have pain or bleeding when you urinate.  This information is not intended to replace advice given to you by your health care provider. Make sure you discuss any questions you have with your health care provider.  Document Released: 09/26/2009 Document Revised: 05/25/2017 Document Reviewed: 08/16/2013  [employer] Interactive Patient Education © 2017 [employer]

## 2018-04-25 LAB
BASOPHILS # BLD AUTO: 0 X10E3/UL (ref 0–0.2)
BASOPHILS NFR BLD AUTO: 0 %
BUN SERPL-MCNC: 32 MG/DL (ref 10–36)
BUN/CREAT SERPL: 21 (ref 12–28)
CALCIUM SERPL-MCNC: 8.6 MG/DL (ref 8.7–10.3)
CHLORIDE SERPL-SCNC: 98 MMOL/L (ref 96–106)
CO2 SERPL-SCNC: 25 MMOL/L (ref 18–29)
CREAT SERPL-MCNC: 1.53 MG/DL (ref 0.57–1)
EOSINOPHIL # BLD AUTO: 0 X10E3/UL (ref 0–0.4)
EOSINOPHIL NFR BLD AUTO: 0 %
ERYTHROCYTE [DISTWIDTH] IN BLOOD BY AUTOMATED COUNT: 15.8 % (ref 12.3–15.4)
ERYTHROCYTE [SEDIMENTATION RATE] IN BLOOD BY WESTERGREN METHOD: 7 MM/HR (ref 0–40)
GFR SERPLBLD CREATININE-BSD FMLA CKD-EPI: 30 ML/MIN/1.73
GFR SERPLBLD CREATININE-BSD FMLA CKD-EPI: 34 ML/MIN/1.73
GLUCOSE SERPL-MCNC: 101 MG/DL (ref 65–99)
HCT VFR BLD AUTO: 41.7 % (ref 34–46.6)
HGB BLD-MCNC: 13.7 G/DL (ref 11.1–15.9)
IMM GRANULOCYTES # BLD: 0 X10E3/UL (ref 0–0.1)
IMM GRANULOCYTES NFR BLD: 0 %
LYMPHOCYTES # BLD AUTO: 0.8 X10E3/UL (ref 0.7–3.1)
LYMPHOCYTES NFR BLD AUTO: 14 %
MCH RBC QN AUTO: 28.5 PG (ref 26.6–33)
MCHC RBC AUTO-ENTMCNC: 32.9 G/DL (ref 31.5–35.7)
MCV RBC AUTO: 87 FL (ref 79–97)
MONOCYTES # BLD AUTO: 0.8 X10E3/UL (ref 0.1–0.9)
MONOCYTES NFR BLD AUTO: 12 %
NEUTROPHILS # BLD AUTO: 4.4 X10E3/UL (ref 1.4–7)
NEUTROPHILS NFR BLD AUTO: 74 %
PLATELET # BLD AUTO: 139 X10E3/UL (ref 150–379)
POTASSIUM SERPL-SCNC: 4.4 MMOL/L (ref 3.5–5.2)
RBC # BLD AUTO: 4.8 X10E6/UL (ref 3.77–5.28)
SODIUM SERPL-SCNC: 140 MMOL/L (ref 134–144)
WBC # BLD AUTO: 6 X10E3/UL (ref 3.4–10.8)

## 2018-04-25 NOTE — PROGRESS NOTES
Call results to patient.  WBC and sed rate normal suggesting leg not infected.  Kidney function did improve, cr decreased from 2 to 1.5 with decrease in diuretics.  For now just stay on bumex once daily for swelling.

## 2018-04-30 ENCOUNTER — OFFICE VISIT (OUTPATIENT)
Dept: FAMILY MEDICINE CLINIC | Facility: CLINIC | Age: 83
End: 2018-04-30

## 2018-04-30 VITALS
OXYGEN SATURATION: 98 % | HEART RATE: 48 BPM | DIASTOLIC BLOOD PRESSURE: 48 MMHG | SYSTOLIC BLOOD PRESSURE: 82 MMHG | TEMPERATURE: 96.6 F

## 2018-04-30 DIAGNOSIS — I10 BENIGN ESSENTIAL HYPERTENSION: ICD-10-CM

## 2018-04-30 DIAGNOSIS — N17.9 ACUTE RENAL FAILURE, UNSPECIFIED ACUTE RENAL FAILURE TYPE (HCC): ICD-10-CM

## 2018-04-30 DIAGNOSIS — R23.4 WOUND ESCHAR OF FOOT: ICD-10-CM

## 2018-04-30 DIAGNOSIS — L03.115 CELLULITIS OF RIGHT LOWER EXTREMITY: ICD-10-CM

## 2018-04-30 DIAGNOSIS — S81.801D WOUND OF RIGHT LOWER EXTREMITY, SUBSEQUENT ENCOUNTER: Primary | ICD-10-CM

## 2018-04-30 DIAGNOSIS — I50.32 CHRONIC DIASTOLIC CONGESTIVE HEART FAILURE (HCC): ICD-10-CM

## 2018-04-30 DIAGNOSIS — R00.1 BRADYCARDIA: ICD-10-CM

## 2018-04-30 PROCEDURE — 99214 OFFICE O/P EST MOD 30 MIN: CPT | Performed by: FAMILY MEDICINE

## 2018-04-30 RX ORDER — METOPROLOL SUCCINATE 100 MG/1
50 TABLET, EXTENDED RELEASE ORAL DAILY
Qty: 30 TABLET | Refills: 5
Start: 2018-04-30 | End: 2019-01-25 | Stop reason: ALTCHOICE

## 2018-04-30 RX ORDER — CLOTRIMAZOLE 1 %
CREAM (GRAM) TOPICAL EVERY 12 HOURS SCHEDULED
Qty: 60 G | Refills: 5 | Status: SHIPPED | OUTPATIENT
Start: 2018-04-30 | End: 2018-06-08

## 2018-04-30 RX ORDER — DOXYCYCLINE HYCLATE 100 MG/1
100 CAPSULE ORAL 2 TIMES DAILY
Qty: 20 CAPSULE | Refills: 0 | Status: SHIPPED | OUTPATIENT
Start: 2018-04-30 | End: 2018-06-08

## 2018-05-01 LAB
BASOPHILS # BLD AUTO: 0 X10E3/UL (ref 0–0.2)
BASOPHILS NFR BLD AUTO: 0 %
BUN SERPL-MCNC: 27 MG/DL (ref 10–36)
BUN/CREAT SERPL: 23 (ref 12–28)
CALCIUM SERPL-MCNC: 8.2 MG/DL (ref 8.7–10.3)
CHLORIDE SERPL-SCNC: 99 MMOL/L (ref 96–106)
CO2 SERPL-SCNC: 24 MMOL/L (ref 18–29)
CREAT SERPL-MCNC: 1.18 MG/DL (ref 0.57–1)
CRP SERPL-MCNC: 36.7 MG/L (ref 0–4.9)
EOSINOPHIL # BLD AUTO: 0 X10E3/UL (ref 0–0.4)
EOSINOPHIL NFR BLD AUTO: 0 %
ERYTHROCYTE [DISTWIDTH] IN BLOOD BY AUTOMATED COUNT: 16.3 % (ref 12.3–15.4)
ERYTHROCYTE [SEDIMENTATION RATE] IN BLOOD BY WESTERGREN METHOD: 2 MM/HR (ref 0–40)
GFR SERPLBLD CREATININE-BSD FMLA CKD-EPI: 41 ML/MIN/1.73
GFR SERPLBLD CREATININE-BSD FMLA CKD-EPI: 47 ML/MIN/1.73
GLUCOSE SERPL-MCNC: 74 MG/DL (ref 65–99)
HCT VFR BLD AUTO: 41.1 % (ref 34–46.6)
HGB BLD-MCNC: 13.6 G/DL (ref 11.1–15.9)
IMM GRANULOCYTES # BLD: 0 X10E3/UL (ref 0–0.1)
IMM GRANULOCYTES NFR BLD: 0 %
LYMPHOCYTES # BLD AUTO: 1 X10E3/UL (ref 0.7–3.1)
LYMPHOCYTES NFR BLD AUTO: 17 %
MCH RBC QN AUTO: 29.2 PG (ref 26.6–33)
MCHC RBC AUTO-ENTMCNC: 33.1 G/DL (ref 31.5–35.7)
MCV RBC AUTO: 88 FL (ref 79–97)
MONOCYTES # BLD AUTO: 0.7 X10E3/UL (ref 0.1–0.9)
MONOCYTES NFR BLD AUTO: 12 %
NEUTROPHILS # BLD AUTO: 4.1 X10E3/UL (ref 1.4–7)
NEUTROPHILS NFR BLD AUTO: 71 %
PLATELET # BLD AUTO: 161 X10E3/UL (ref 150–379)
POTASSIUM SERPL-SCNC: 3.8 MMOL/L (ref 3.5–5.2)
RBC # BLD AUTO: 4.65 X10E6/UL (ref 3.77–5.28)
SODIUM SERPL-SCNC: 140 MMOL/L (ref 134–144)
WBC # BLD AUTO: 5.8 X10E3/UL (ref 3.4–10.8)

## 2018-05-02 ENCOUNTER — TELEPHONE (OUTPATIENT)
Dept: FAMILY MEDICINE CLINIC | Facility: CLINIC | Age: 83
End: 2018-05-02

## 2018-05-02 NOTE — PROGRESS NOTES
Call results to patient.  Kidney function has continued to improve with backing off diuretics.  Blood counts normal  Sed rate normal, but crp high.  Possibly related to leg.  Abx and wound care as planned ([facility] is seeing patient).    Documentation for chart:  MA and NURA  with nursing coordinated evaluation, no fevers, no dyspnea;  BP greatly improved 120/;  annie reports pain in leg has not worsened.  Wound care nurse to see tomorrow.

## 2018-05-04 ENCOUNTER — TELEPHONE (OUTPATIENT)
Dept: FAMILY MEDICINE CLINIC | Facility: CLINIC | Age: 83
End: 2018-05-04

## 2018-05-04 RX ORDER — LIOTHYRONINE SODIUM 5 UG/1
TABLET ORAL
Qty: 45 TABLET | Refills: 5 | Status: SHIPPED | OUTPATIENT
Start: 2018-05-04 | End: 2018-08-20 | Stop reason: DRUGHIGH

## 2018-05-04 RX ORDER — SODIUM HYPOCHLORITE 2.5 MG/ML
SOLUTION TOPICAL
Qty: 473 ML | Refills: 2 | Status: SHIPPED | OUTPATIENT
Start: 2018-05-04 | End: 2018-09-11 | Stop reason: SDUPTHER

## 2018-05-08 ENCOUNTER — TELEPHONE (OUTPATIENT)
Dept: FAMILY MEDICINE CLINIC | Facility: CLINIC | Age: 83
End: 2018-05-08

## 2018-05-08 RX ORDER — LEVOFLOXACIN 250 MG/1
250 TABLET ORAL DAILY
Qty: 7 TABLET | Refills: 0 | Status: SHIPPED | OUTPATIENT
Start: 2018-05-08 | End: 2018-05-15

## 2018-05-08 RX ORDER — AMIODARONE HYDROCHLORIDE 200 MG/1
200 TABLET ORAL DAILY
Start: 2018-05-08

## 2018-05-09 ENCOUNTER — TELEPHONE (OUTPATIENT)
Dept: FAMILY MEDICINE CLINIC | Facility: CLINIC | Age: 83
End: 2018-05-09

## 2018-05-09 NOTE — TELEPHONE ENCOUNTER
Not many other options given organism and sensitivities along with renal issues.  Shorten course to just 3 days as should be adequate treatment.    RRJ

## 2018-05-10 RX ORDER — LEVOTHYROXINE SODIUM 0.07 MG/1
75 TABLET ORAL DAILY
Qty: 30 TABLET | Refills: 6 | Status: SHIPPED | OUTPATIENT
Start: 2018-05-10 | End: 2018-12-17 | Stop reason: SDUPTHER

## 2018-05-11 ENCOUNTER — TELEPHONE (OUTPATIENT)
Dept: FAMILY MEDICINE CLINIC | Facility: CLINIC | Age: 83
End: 2018-05-11

## 2018-05-11 NOTE — TELEPHONE ENCOUNTER
Pt informed again and [facility]na nurse will be there in a few minutes and she will review meds with her too. carlie

## 2018-05-11 NOTE — TELEPHONE ENCOUNTER
She should be on both.  We almost never give just cytomel by itself.  She has both on med list.    RRJ

## 2018-05-14 ENCOUNTER — OFFICE VISIT (OUTPATIENT)
Dept: FAMILY MEDICINE CLINIC | Facility: CLINIC | Age: 83
End: 2018-05-14

## 2018-05-14 VITALS
HEART RATE: 52 BPM | DIASTOLIC BLOOD PRESSURE: 62 MMHG | OXYGEN SATURATION: 96 % | SYSTOLIC BLOOD PRESSURE: 98 MMHG | TEMPERATURE: 98.2 F

## 2018-05-14 DIAGNOSIS — N18.30 STAGE 3 CHRONIC KIDNEY DISEASE (HCC): ICD-10-CM

## 2018-05-14 DIAGNOSIS — IMO0001 CHRONIC VENOUS HYPERTENSION W ULCERATION, RIGHT: ICD-10-CM

## 2018-05-14 DIAGNOSIS — L89.621 DECUBITUS ULCER OF LEFT HEEL, STAGE 1: ICD-10-CM

## 2018-05-14 DIAGNOSIS — I50.32 CHRONIC DIASTOLIC CONGESTIVE HEART FAILURE (HCC): ICD-10-CM

## 2018-05-14 DIAGNOSIS — I10 BENIGN ESSENTIAL HYPERTENSION: Primary | ICD-10-CM

## 2018-05-14 PROCEDURE — 99213 OFFICE O/P EST LOW 20 MIN: CPT | Performed by: FAMILY MEDICINE

## 2018-05-14 NOTE — PROGRESS NOTES
Subjective   Lisa Grant is a 89 y.o. female. Presents today for   Chief Complaint   Patient presents with   • Wound Check     pt here for 2 week follow up for sores on her legs and feet.       Wound Check   She was originally treated more than 14 days ago (wound care nurse seeing patient now.  NOt able to travel to wound care center.). Previous treatment included wound cleansing or irrigation. There has been clear discharge from the wound. The redness has improved. The swelling has improved. The pain has improved.   Congestive Heart Failure   Presents for follow-up visit. Associated symptoms include edema. Pertinent negatives include no chest pain, chest pressure, near-syncope, orthopnea, palpitations, paroxysmal nocturnal dyspnea, shortness of breath or unexpected weight change. The symptoms have been stable. Compliance with total regimen is 51-75%. Compliance problems: some issues with confusion over medications and what to take, had nurse with [facility] review with patient and take as directed.  no further dyspnea;  Had ARF after aggressive diuresis and low BP, but has improved bp and renal function with backing off diuretics.  Compliance with exercise is 0-25%. Compliance with medications is 51-75%.       Review of Systems   Constitutional: Negative for unexpected weight change.   Respiratory: Negative for shortness of breath.    Cardiovascular: Negative for chest pain, palpitations and near-syncope.       The following portions of the patient's history were reviewed and updated as appropriate: allergies, current medications, past medical history and problem list.    Patient Active Problem List   Diagnosis   • Paroxysmal atrial fibrillation   • Angina pectoris   • Anxiety   • Atherosclerosis of aorta   • Coronary arteriosclerosis in native artery   • Benign essential hypertension   • Chronic kidney disease   • Claudication   • Congestive heart failure   • Constipation   • Type 2 diabetes mellitus with diabetic  nephropathy   • Dry skin dermatitis   • Dyslipidemia   • Gastroesophageal reflux disease with esophagitis   • Glaucoma   • Hypothyroidism   • Knee pain   • Lymphedema   • Onychomycosis   • Primary insomnia   • Tinea pedis   • Vitamin D deficiency       Allergies   Allergen Reactions   • Codeine    • Hydrocodone-Acetaminophen    • Metoclopramide    • Oxycodone    • Propoxyphene        Current Outpatient Prescriptions on File Prior to Visit   Medication Sig Dispense Refill   • alendronate (FOSAMAX) 70 MG tablet TAKE ONE TABLET BY MOUTH ONCE A WEEK 12 tablet 1   • amiodarone (PACERONE) 200 MG tablet Take 1 tablet by mouth Daily.     • aspirin 81 MG chewable tablet Chew 81 mg Daily.     • bumetanide (BUMEX) 1 MG tablet Take 1 tablet by mouth Daily. (Patient taking differently: Take 0.5 mg by mouth Daily.) 60 tablet 1   • CALCIUM PO Take  by mouth.     • clopidogrel (PLAVIX) 75 MG tablet Take 75 mg by mouth Daily.     • clotrimazole (LOTRIMIN) 1 % cream Apply  topically Every 12 (Twelve) Hours. Apply to both feet and right leg (around wounds). 60 g 5   • collagenase (SANTYL) 250 UNIT/GM ointment Apply  topically Daily. To right lower extremity and foot wounds 90 g 2   • diazePAM (VALIUM) 5 MG tablet After 10mg valium start 1.5 tab 5mg valium nightly x 14 days, then 1 valium nighly x 14 days then see doctor for further weaning. 35 tablet 0   • doxycycline (VIBRAMYCIN) 100 MG capsule Take 1 capsule by mouth 2 (Two) Times a Day. 20 capsule 0   • gabapentin (NEURONTIN) 100 MG capsule 1 po nightly x 14 days, then 1 po twice daily for back pain 60 capsule 2   • levothyroxine (SYNTHROID, LEVOTHROID) 75 MCG tablet Take 1 tablet by mouth Daily. 30 tablet 6   • liothyronine (CYTOMEL) 5 MCG tablet TAKE ONE & ONE-HALF TABLETS BY MOUTH ONCE DAILY 45 tablet 5   • metoprolol succinate XL (TOPROL-XL) 100 MG 24 hr tablet Take 0.5 tablets by mouth Daily. 30 tablet 5   • mupirocin (BACTROBAN) 2 % ointment Apply  topically 2 (Two) Times a  Day. 30 g 1   • nitroglycerin (NITROLINGUAL) 0.4 MG/SPRAY spray Nitroglycerin 0.4 MG/SPRAY Translingual Solution; Patient Sig: Nitroglycerin 0.4 MG/SPRAY Translingual Solution USE AS DIRECTED.; 0; 11-Sep-2013; Active     • pantoprazole (PROTONIX) 40 MG EC tablet Take 40 mg by mouth Daily.     • Polyethylene Glycol 3350 granules Take  by mouth daily.     • pravastatin (PRAVACHOL) 20 MG tablet Take 1 tablet by mouth Daily. 30 tablet 5   • sodium hypochlorite (DAKIN'S) 0.2-0.25 % topical solution Use daily as per wound care instructions 473 mL 2   • traZODone (DESYREL) 50 MG tablet Take 1 tablet by mouth Every Night. 30 tablet 2   • urea (CARMOL) 40 % cream Apply  topically Daily. To lower extremities and feet 198 g 5     No current facility-administered medications on file prior to visit.        Objective   Vitals:    05/14/18 1410   BP: 98/62   BP Location: Right arm   Patient Position: Sitting   Cuff Size: Adult   Pulse: 52   Temp: 98.2 °F (36.8 °C)   TempSrc: Oral   SpO2: 96%       Physical Exam   Constitutional: She appears well-developed and well-nourished.   HENT:   Head: Normocephalic and atraumatic.   Neck: Neck supple. No JVD present. No thyromegaly present.   Cardiovascular: Normal rate, regular rhythm and normal heart sounds.  Exam reveals no gallop and no friction rub.    No murmur heard.  Pulmonary/Chest: Effort normal and breath sounds normal. No respiratory distress. She has no wheezes. She has no rales.   Abdominal: Soft. Bowel sounds are normal. She exhibits no distension. There is no tenderness. There is no rebound and no guarding.   Musculoskeletal: She exhibits edema.   Neurological: She is alert.   Skin: Skin is warm and dry.   Right leg ulcerations dorsal foot, ankle and posterior calf noted.  Necrotic tissue resolved.  Skin of leg scaling, but wound beds pink and re-epi.  Left heel wound, stable.   Applied topical, xeroform gauze and sterile dressings.  Double wrapped with ace bandages.    Psychiatric: She has a normal mood and affect. Her behavior is normal.   Nursing note and vitals reviewed.      Assessment/Plan   Lisa was seen today for wound check.    Diagnoses and all orders for this visit:    Benign essential hypertension    Stage 3 chronic kidney disease    Chronic venous hypertension w ulceration, right    Decubitus ulcer of left heel, stage 1    Chronic diastolic congestive heart failure    -continue wound care as ordered  -wrap legs for compression as venous congestion slowing wound healing  -will have [facility]heck renal function  -bp no longer hyoptensive off losartan.  -no dyspnea with chf, lungs clear         -Follow up: 3 weeks       Current Outpatient Prescriptions:   •  alendronate (FOSAMAX) 70 MG tablet, TAKE ONE TABLET BY MOUTH ONCE A WEEK, Disp: 12 tablet, Rfl: 1  •  amiodarone (PACERONE) 200 MG tablet, Take 1 tablet by mouth Daily., Disp: , Rfl:   •  aspirin 81 MG chewable tablet, Chew 81 mg Daily., Disp: , Rfl:   •  bumetanide (BUMEX) 1 MG tablet, Take 1 tablet by mouth Daily. (Patient taking differently: Take 0.5 mg by mouth Daily.), Disp: 60 tablet, Rfl: 1  •  CALCIUM PO, Take  by mouth., Disp: , Rfl:   •  clopidogrel (PLAVIX) 75 MG tablet, Take 75 mg by mouth Daily., Disp: , Rfl:   •  clotrimazole (LOTRIMIN) 1 % cream, Apply  topically Every 12 (Twelve) Hours. Apply to both feet and right leg (around wounds)., Disp: 60 g, Rfl: 5  •  collagenase (SANTYL) 250 UNIT/GM ointment, Apply  topically Daily. To right lower extremity and foot wounds, Disp: 90 g, Rfl: 2  •  diazePAM (VALIUM) 5 MG tablet, After 10mg valium start 1.5 tab 5mg valium nightly x 14 days, then 1 valium nighly x 14 days then see doctor for further weaning., Disp: 35 tablet, Rfl: 0  •  doxycycline (VIBRAMYCIN) 100 MG capsule, Take 1 capsule by mouth 2 (Two) Times a Day., Disp: 20 capsule, Rfl: 0  •  gabapentin (NEURONTIN) 100 MG capsule, 1 po nightly x 14 days, then 1 po twice daily for back pain, Disp: 60  capsule, Rfl: 2  •  levothyroxine (SYNTHROID, LEVOTHROID) 75 MCG tablet, Take 1 tablet by mouth Daily., Disp: 30 tablet, Rfl: 6  •  liothyronine (CYTOMEL) 5 MCG tablet, TAKE ONE & ONE-HALF TABLETS BY MOUTH ONCE DAILY, Disp: 45 tablet, Rfl: 5  •  metoprolol succinate XL (TOPROL-XL) 100 MG 24 hr tablet, Take 0.5 tablets by mouth Daily., Disp: 30 tablet, Rfl: 5  •  mupirocin (BACTROBAN) 2 % ointment, Apply  topically 2 (Two) Times a Day., Disp: 30 g, Rfl: 1  •  nitroglycerin (NITROLINGUAL) 0.4 MG/SPRAY spray, Nitroglycerin 0.4 MG/SPRAY Translingual Solution; Patient Sig: Nitroglycerin 0.4 MG/SPRAY Translingual Solution USE AS DIRECTED.; 0; 11-Sep-2013; Active, Disp: , Rfl:   •  pantoprazole (PROTONIX) 40 MG EC tablet, Take 40 mg by mouth Daily., Disp: , Rfl:   •  Polyethylene Glycol 3350 granules, Take  by mouth daily., Disp: , Rfl:   •  pravastatin (PRAVACHOL) 20 MG tablet, Take 1 tablet by mouth Daily., Disp: 30 tablet, Rfl: 5  •  sodium hypochlorite (DAKIN'S) 0.2-0.25 % topical solution, Use daily as per wound care instructions, Disp: 473 mL, Rfl: 2  •  traZODone (DESYREL) 50 MG tablet, Take 1 tablet by mouth Every Night., Disp: 30 tablet, Rfl: 2  •  urea (CARMOL) 40 % cream, Apply  topically Daily. To lower extremities and feet, Disp: 198 g, Rfl: 5

## 2018-06-08 ENCOUNTER — OFFICE VISIT (OUTPATIENT)
Dept: FAMILY MEDICINE CLINIC | Facility: CLINIC | Age: 83
End: 2018-06-08

## 2018-06-08 VITALS
TEMPERATURE: 97.3 F | OXYGEN SATURATION: 94 % | HEART RATE: 80 BPM | DIASTOLIC BLOOD PRESSURE: 58 MMHG | SYSTOLIC BLOOD PRESSURE: 92 MMHG

## 2018-06-08 DIAGNOSIS — I50.32 CHRONIC DIASTOLIC CONGESTIVE HEART FAILURE (HCC): ICD-10-CM

## 2018-06-08 DIAGNOSIS — L97.511 SKIN ULCER OF TOE OF RIGHT FOOT, LIMITED TO BREAKDOWN OF SKIN (HCC): ICD-10-CM

## 2018-06-08 DIAGNOSIS — N17.9 ACUTE RENAL FAILURE, UNSPECIFIED ACUTE RENAL FAILURE TYPE (HCC): ICD-10-CM

## 2018-06-08 DIAGNOSIS — L97.911 ULCER OF RIGHT LOWER EXTREMITY, LIMITED TO BREAKDOWN OF SKIN (HCC): Primary | ICD-10-CM

## 2018-06-08 DIAGNOSIS — I10 BENIGN ESSENTIAL HYPERTENSION: ICD-10-CM

## 2018-06-08 DIAGNOSIS — B35.1 ONYCHOMYCOSIS DUE TO DERMATOPHYTE: ICD-10-CM

## 2018-06-08 DIAGNOSIS — L85.3 DRY SKIN DERMATITIS: ICD-10-CM

## 2018-06-08 DIAGNOSIS — B35.3 TINEA PEDIS OF BOTH FEET: ICD-10-CM

## 2018-06-08 LAB
HCT VFR BLDA CALC: 41.2 %
HGB BLDA-MCNC: 13.9 G/DL
MCH, POC: 30.9
MCHC, POC: 33.6
MCV, POC: 91.9
PLATELET # BLD AUTO: 139 10*3/MM3
PMV BLD: 9 FL
RBC, POC: 4.49
RDW, POC: 19.4
WBC # BLD: 5.1 10*3/UL

## 2018-06-08 PROCEDURE — 99214 OFFICE O/P EST MOD 30 MIN: CPT | Performed by: FAMILY MEDICINE

## 2018-06-08 PROCEDURE — 85027 COMPLETE CBC AUTOMATED: CPT | Performed by: FAMILY MEDICINE

## 2018-06-08 RX ORDER — UREA 40 %
CREAM (GRAM) TOPICAL
Qty: 198 G | Refills: 1 | Status: SHIPPED | OUTPATIENT
Start: 2018-06-08 | End: 2019-01-25

## 2018-06-08 RX ORDER — KETOCONAZOLE 20 MG/G
CREAM TOPICAL DAILY
Qty: 80 G | Refills: 2 | Status: SHIPPED | OUTPATIENT
Start: 2018-06-08 | End: 2019-01-25

## 2018-06-08 RX ORDER — TERBINAFINE HYDROCHLORIDE 250 MG/1
250 TABLET ORAL DAILY
Qty: 28 TABLET | Refills: 0 | Status: SHIPPED | OUTPATIENT
Start: 2018-06-08 | End: 2019-01-25

## 2018-06-08 NOTE — PATIENT INSTRUCTIONS
I have added 2 new topical to the bottom of feet urea and ketoconazole to try and heel cracking (ok to apply at time of dressing changes only).  I have also ordered antifungal (terbinafine) to take daily as I think a fungal infection is worsening.  Otherwise continue wound care as per wound care nurse.

## 2018-06-08 NOTE — PROGRESS NOTES
Subjective   Lisa Grant is a 89 y.o. female. Presents today for   Chief Complaint   Patient presents with   • Wound Check     PT IS HERE FOR 3 WEEK FOLLOW UP       Wound Check   She was originally treated more than 14 days ago. Previous treatment included wound cleansing or irrigation. Maximum temperature: no fevers or chills. There has been bloody discharge from the wound. The redness has improved. The swelling has improved. The pain has improved. There is difficulty moving the extremity or digit due to pain.   Congestive Heart Failure   Presents for initial visit. The disease course has been stable. Associated symptoms include edema. Pertinent negatives include no chest pain, chest pressure, fatigue, near-syncope, nocturia, orthopnea, palpitations, paroxysmal nocturnal dyspnea, shortness of breath or unexpected weight change. The symptoms have been improving. Past treatments include salt and fluid restriction (diuresis, had to cut back on bp lowering medications due to hypotension, which has improved.). The treatment provided moderate relief. Compliance with prior treatments has been variable. Prior compliance problems include difficulty understanding directions. Her past medical history is significant for arrhythmia, CAD and HTN.   Hypertension   This is a chronic problem. The current episode started more than 1 year ago. The problem has been gradually improving since onset. Associated symptoms include peripheral edema. Pertinent negatives include no chest pain, orthopnea, palpitations, PND or shortness of breath. There are no associated agents to hypertension. Risk factors for coronary artery disease include dyslipidemia, obesity and post-menopausal state. Treatments tried: Have cut back on medications as low HR and BP;  Also decreased diuretics as ARF, which has improved. Hypertensive end-organ damage includes kidney disease, CAD/MI and heart failure. Identifiable causes of hypertension include chronic renal  disease.         Wound care plan by suggested by wound care nurse of which has been doing:  Recommending cleanse wound with wound cleanser or normal saline, apply adaptic to affected areas, cover with dakins 1/8th moist to dry gauze, cover with abd pads, wrap with rolled gauze and elastic wrap behind toes to below knee, change daily x 1 wk, and prn for loose or soiled dressing.   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   When wounds start healing, then the treatment can be changed to alginate to wound bed and 3 layer compression system (profore lite) per package instructions and if you havnt used treatment she can educate the pt.     Review of Systems   Constitutional: Negative for fatigue and unexpected weight change.   Respiratory: Negative for shortness of breath.    Cardiovascular: Negative for chest pain, palpitations, orthopnea, PND and near-syncope.   Genitourinary: Negative for nocturia.       The following portions of the patient's history were reviewed and updated as appropriate: allergies, current medications, past medical history and problem list.    Patient Active Problem List   Diagnosis   • Paroxysmal atrial fibrillation   • Angina pectoris   • Anxiety   • Atherosclerosis of aorta   • Coronary arteriosclerosis in native artery   • Benign essential hypertension   • Chronic kidney disease   • Claudication   • Congestive heart failure   • Constipation   • Type 2 diabetes mellitus with diabetic nephropathy   • Dry skin dermatitis   • Dyslipidemia   • Gastroesophageal reflux disease with esophagitis   • Glaucoma   • Hypothyroidism   • Knee pain   • Lymphedema   • Onychomycosis   • Primary insomnia   • Tinea pedis   • Vitamin D deficiency       Allergies   Allergen Reactions   • Codeine    • Hydrocodone-Acetaminophen    • Metoclopramide    • Oxycodone    • Propoxyphene        Current Outpatient Prescriptions on File Prior to Visit   Medication Sig Dispense Refill   •  alendronate (FOSAMAX) 70 MG tablet TAKE ONE TABLET BY MOUTH ONCE A WEEK 12 tablet 1   • amiodarone (PACERONE) 200 MG tablet Take 1 tablet by mouth Daily.     • aspirin 81 MG chewable tablet Chew 81 mg Daily.     • bumetanide (BUMEX) 1 MG tablet Take 1 tablet by mouth Daily. (Patient taking differently: Take 0.5 mg by mouth Daily.) 60 tablet 1   • CALCIUM PO Take  by mouth.     • clopidogrel (PLAVIX) 75 MG tablet Take 75 mg by mouth Daily.     • clotrimazole (LOTRIMIN) 1 % cream Apply  topically Every 12 (Twelve) Hours. Apply to both feet and right leg (around wounds). 60 g 5   • collagenase (SANTYL) 250 UNIT/GM ointment Apply  topically Daily. To right lower extremity and foot wounds 90 g 2   • diazePAM (VALIUM) 5 MG tablet After 10mg valium start 1.5 tab 5mg valium nightly x 14 days, then 1 valium nighly x 14 days then see doctor for further weaning. 35 tablet 0   • gabapentin (NEURONTIN) 100 MG capsule 1 po nightly x 14 days, then 1 po twice daily for back pain 60 capsule 2   • levothyroxine (SYNTHROID, LEVOTHROID) 75 MCG tablet Take 1 tablet by mouth Daily. 30 tablet 6   • liothyronine (CYTOMEL) 5 MCG tablet TAKE ONE & ONE-HALF TABLETS BY MOUTH ONCE DAILY 45 tablet 5   • metoprolol succinate XL (TOPROL-XL) 100 MG 24 hr tablet Take 0.5 tablets by mouth Daily. 30 tablet 5   • mupirocin (BACTROBAN) 2 % ointment Apply  topically 2 (Two) Times a Day. 30 g 1   • nitroglycerin (NITROLINGUAL) 0.4 MG/SPRAY spray Nitroglycerin 0.4 MG/SPRAY Translingual Solution; Patient Sig: Nitroglycerin 0.4 MG/SPRAY Translingual Solution USE AS DIRECTED.; 0; 11-Sep-2013; Active     • pantoprazole (PROTONIX) 40 MG EC tablet Take 40 mg by mouth Daily.     • Polyethylene Glycol 3350 granules Take  by mouth daily.     • pravastatin (PRAVACHOL) 20 MG tablet Take 1 tablet by mouth Daily. 30 tablet 5   • sodium hypochlorite (DAKIN'S) 0.2-0.25 % topical solution Use daily as per wound care instructions 473 mL 2   • traZODone (DESYREL) 50 MG  tablet Take 1 tablet by mouth Every Night. 30 tablet 2   • urea (CARMOL) 40 % cream Apply  topically Daily. To lower extremities and feet 198 g 5   • [DISCONTINUED] doxycycline (VIBRAMYCIN) 100 MG capsule Take 1 capsule by mouth 2 (Two) Times a Day. 20 capsule 0     No current facility-administered medications on file prior to visit.        Objective   Vitals:    06/08/18 1345   BP: 92/58   BP Location: Left arm   Patient Position: Sitting   Cuff Size: Adult   Pulse: 80   Temp: 97.3 °F (36.3 °C)   TempSrc: Oral   SpO2: 94%   Weight: Comment: PT IN WHEELCHAIR, UNABLE TO WEIGH       Physical Exam   Constitutional: She appears well-developed and well-nourished.   HENT:   Head: Normocephalic and atraumatic.   Neck: Neck supple. No JVD present. No thyromegaly present.   Cardiovascular: Normal rate, regular rhythm and normal heart sounds.  Exam reveals no gallop and no friction rub.    No murmur heard.  Pulmonary/Chest: Effort normal and breath sounds normal. No respiratory distress. She has no wheezes. She has no rales.   Abdominal: Soft. Bowel sounds are normal. She exhibits no distension. There is no tenderness. There is no rebound and no guarding.   Musculoskeletal: She exhibits edema.   Neurological: She is alert.   Skin: Skin is warm and dry.   Comments:  1) right lateral calf 3x3cm, small area of black eschar;  2) right medial ankle 4x 3cm; 3) Right MTP 1.5 x 1cm;      Right tip of hallux x 2 1cm x 1.5cm 1cm x 1.5 with eschar  Right tip of 2nd toe 0.5cm x 0.5cm with eschar    Severe skin thckinening and crackign of soles of feet.     Psychiatric: She has a normal mood and affect. Her behavior is normal.   Nursing note and vitals reviewed.      Assessment/Plan   Lisa was seen today for wound check.    Diagnoses and all orders for this visit:    Ulcer of right lower extremity, limited to breakdown of skin  Comments:  1) right lateral calf 3x3cm, small area of black eschar;  2) right medial ankle 4x 3cm; 3) Right MTP  1.5 x 1cm;    Orders:  -     Comprehensive Metabolic Panel  -     Sedimentation Rate  -     POC CBC    Skin ulcer of toe of right foot, limited to breakdown of skin  Comments:  Right tip of hallux x 2 1cm x 1.5cm 1cm x 1.5 with eschar  Right tip of 2nd toe 0.5cm x 0.5cm with eschar  Orders:  -     Comprehensive Metabolic Panel  -     Sedimentation Rate  -     POC CBC    Dry skin dermatitis  -     Comprehensive Metabolic Panel  -     Sedimentation Rate  -     POC CBC  -     urea (CARMOL) 40 % cream; Apply  topically Daily. Apply to feet and toes (avoid wounds);  Apply with dressing changes    Tinea pedis of both feet  -     Comprehensive Metabolic Panel  -     Sedimentation Rate  -     POC CBC  -     terbinafine (lamiSIL) 250 MG tablet; Take 1 tablet by mouth Daily.  -     ketoconazole (NIZORAL) 2 % cream; Apply  topically Daily. Apply to feet and toes with dressing changes    Benign essential hypertension    Chronic diastolic congestive heart failure    Onychomycosis due to dermatophyte    Acute renal failure, unspecified acute renal failure type    -continue wound care as ntoed above;  Today added silvadene to heels as need to debride.  Will add urea and antifungal treatment as likely component.  Leg ulcerations slowly improving.  Patient with dependent edema, ace wrapped for compression.  She keeps her legs down extended periods, encouraged to elevate.  -chf - stable;  Hx of ARF after diuresis, recheck kdiney function, had been improving.  -bp and HR safer range since cutting back on medications.         -Follow up: 3 weeks       Current Outpatient Prescriptions:   •  alendronate (FOSAMAX) 70 MG tablet, TAKE ONE TABLET BY MOUTH ONCE A WEEK, Disp: 12 tablet, Rfl: 1  •  amiodarone (PACERONE) 200 MG tablet, Take 1 tablet by mouth Daily., Disp: , Rfl:   •  aspirin 81 MG chewable tablet, Chew 81 mg Daily., Disp: , Rfl:   •  bumetanide (BUMEX) 1 MG tablet, Take 1 tablet by mouth Daily. (Patient taking differently: Take  0.5 mg by mouth Daily.), Disp: 60 tablet, Rfl: 1  •  CALCIUM PO, Take  by mouth., Disp: , Rfl:   •  clopidogrel (PLAVIX) 75 MG tablet, Take 75 mg by mouth Daily., Disp: , Rfl:   •  clotrimazole (LOTRIMIN) 1 % cream, Apply  topically Every 12 (Twelve) Hours. Apply to both feet and right leg (around wounds)., Disp: 60 g, Rfl: 5  •  collagenase (SANTYL) 250 UNIT/GM ointment, Apply  topically Daily. To right lower extremity and foot wounds, Disp: 90 g, Rfl: 2  •  diazePAM (VALIUM) 5 MG tablet, After 10mg valium start 1.5 tab 5mg valium nightly x 14 days, then 1 valium nighly x 14 days then see doctor for further weaning., Disp: 35 tablet, Rfl: 0  •  gabapentin (NEURONTIN) 100 MG capsule, 1 po nightly x 14 days, then 1 po twice daily for back pain, Disp: 60 capsule, Rfl: 2  •  levothyroxine (SYNTHROID, LEVOTHROID) 75 MCG tablet, Take 1 tablet by mouth Daily., Disp: 30 tablet, Rfl: 6  •  liothyronine (CYTOMEL) 5 MCG tablet, TAKE ONE & ONE-HALF TABLETS BY MOUTH ONCE DAILY, Disp: 45 tablet, Rfl: 5  •  metoprolol succinate XL (TOPROL-XL) 100 MG 24 hr tablet, Take 0.5 tablets by mouth Daily., Disp: 30 tablet, Rfl: 5  •  mupirocin (BACTROBAN) 2 % ointment, Apply  topically 2 (Two) Times a Day., Disp: 30 g, Rfl: 1  •  nitroglycerin (NITROLINGUAL) 0.4 MG/SPRAY spray, Nitroglycerin 0.4 MG/SPRAY Translingual Solution; Patient Sig: Nitroglycerin 0.4 MG/SPRAY Translingual Solution USE AS DIRECTED.; 0; 11-Sep-2013; Active, Disp: , Rfl:   •  pantoprazole (PROTONIX) 40 MG EC tablet, Take 40 mg by mouth Daily., Disp: , Rfl:   •  Polyethylene Glycol 3350 granules, Take  by mouth daily., Disp: , Rfl:   •  pravastatin (PRAVACHOL) 20 MG tablet, Take 1 tablet by mouth Daily., Disp: 30 tablet, Rfl: 5  •  sodium hypochlorite (DAKIN'S) 0.2-0.25 % topical solution, Use daily as per wound care instructions, Disp: 473 mL, Rfl: 2  •  traZODone (DESYREL) 50 MG tablet, Take 1 tablet by mouth Every Night., Disp: 30 tablet, Rfl: 2  •  urea (CARMOL) 40  % cream, Apply  topically Daily. To lower extremities and feet, Disp: 198 g, Rfl: 5

## 2018-06-09 LAB
ALBUMIN SERPL-MCNC: 3.2 G/DL (ref 3.2–4.6)
ALBUMIN/GLOB SERPL: 1.5 {RATIO} (ref 1.2–2.2)
ALP SERPL-CCNC: 45 IU/L (ref 39–117)
ALT SERPL-CCNC: 11 IU/L (ref 0–32)
AST SERPL-CCNC: 22 IU/L (ref 0–40)
BILIRUB SERPL-MCNC: 0.8 MG/DL (ref 0–1.2)
BUN SERPL-MCNC: 26 MG/DL (ref 10–36)
BUN/CREAT SERPL: 21 (ref 12–28)
CALCIUM SERPL-MCNC: 8.8 MG/DL (ref 8.7–10.3)
CHLORIDE SERPL-SCNC: 97 MMOL/L (ref 96–106)
CO2 SERPL-SCNC: 24 MMOL/L (ref 18–29)
CREAT SERPL-MCNC: 1.26 MG/DL (ref 0.57–1)
ERYTHROCYTE [SEDIMENTATION RATE] IN BLOOD BY WESTERGREN METHOD: 2 MM/HR (ref 0–40)
GFR SERPLBLD CREATININE-BSD FMLA CKD-EPI: 38 ML/MIN/1.73
GFR SERPLBLD CREATININE-BSD FMLA CKD-EPI: 43 ML/MIN/1.73
GLOBULIN SER CALC-MCNC: 2.1 G/DL (ref 1.5–4.5)
GLUCOSE SERPL-MCNC: 100 MG/DL (ref 65–99)
POTASSIUM SERPL-SCNC: 4.3 MMOL/L (ref 3.5–5.2)
PROT SERPL-MCNC: 5.3 G/DL (ref 6–8.5)
SODIUM SERPL-SCNC: 140 MMOL/L (ref 134–144)

## 2018-06-18 RX ORDER — AMMONIUM LACTATE 12 G/100G
CREAM TOPICAL
Qty: 385 G | Refills: 1 | Status: SHIPPED | OUTPATIENT
Start: 2018-06-18 | End: 2019-01-25

## 2018-06-22 ENCOUNTER — OFFICE VISIT (OUTPATIENT)
Dept: FAMILY MEDICINE CLINIC | Facility: CLINIC | Age: 83
End: 2018-06-22

## 2018-06-22 VITALS — HEART RATE: 56 BPM | DIASTOLIC BLOOD PRESSURE: 70 MMHG | OXYGEN SATURATION: 98 % | SYSTOLIC BLOOD PRESSURE: 98 MMHG

## 2018-06-22 DIAGNOSIS — Z51.89 ENCOUNTER FOR WOUND CARE: ICD-10-CM

## 2018-06-22 DIAGNOSIS — R60.0 LOWER EXTREMITY EDEMA: ICD-10-CM

## 2018-06-22 DIAGNOSIS — I89.0 LYMPHEDEMA: Primary | ICD-10-CM

## 2018-06-22 PROCEDURE — 99213 OFFICE O/P EST LOW 20 MIN: CPT | Performed by: FAMILY MEDICINE

## 2018-06-22 NOTE — PROGRESS NOTES
Subjective   Lisa Grant is a 89 y.o. female. Presents today for   Chief Complaint   Patient presents with   • Follow-up     pt here for follow up for wound care       Wound Check   She was originally treated more than 14 days ago. Previous treatment included wound cleansing or irrigation. There has been no drainage from the wound. The redness has improved. The swelling has improved. The pain has improved (still pain at wound site, nik ankle).   patient with feet down long-term;   Reports does want to go to Renal;  Had PHILIP, but improved.  Has CKD III now based on Cr for age, but improved and stabilized with Cr peak over 2 (labs from [facility] drawn by [facility], see scanned report).  [facility] still coming.    Review of Systems   Musculoskeletal: Positive for arthralgias, back pain and gait problem.   Skin: Positive for wound.       Patient Active Problem List   Diagnosis   • Paroxysmal atrial fibrillation   • Angina pectoris   • Anxiety   • Atherosclerosis of aorta   • Coronary arteriosclerosis in native artery   • Benign essential hypertension   • Chronic kidney disease   • Claudication   • Congestive heart failure   • Constipation   • Type 2 diabetes mellitus with diabetic nephropathy   • Dry skin dermatitis   • Dyslipidemia   • Gastroesophageal reflux disease with esophagitis   • Glaucoma   • Hypothyroidism   • Knee pain   • Lymphedema   • Onychomycosis   • Primary insomnia   • Tinea pedis   • Vitamin D deficiency       Social History     Social History   • Marital status:      Social History Main Topics   • Smoking status: Never Smoker   • Smokeless tobacco: Never Used   • Alcohol use No   • Drug use: No     Other Topics Concern   • Not on file       Allergies   Allergen Reactions   • Codeine    • Hydrocodone-Acetaminophen    • Metoclopramide    • Oxycodone    • Propoxyphene        Current Outpatient Prescriptions on File Prior to Visit   Medication Sig Dispense Refill   • alendronate (FOSAMAX) 70 MG tablet TAKE ONE  TABLET BY MOUTH ONCE A WEEK 12 tablet 1   • amiodarone (PACERONE) 200 MG tablet Take 1 tablet by mouth Daily.     • ammonium lactate (AMLACTIN) 12 % cream APPLY CREAM TOPICALLY DAILY TO FEET AND TOES (AVOID WOUNDS). APPLY WITH DRESSING CHANGES 385 g 1   • aspirin 81 MG chewable tablet Chew 81 mg Daily.     • bumetanide (BUMEX) 1 MG tablet Take 1 tablet by mouth Daily. (Patient taking differently: Take 0.5 mg by mouth Daily.) 60 tablet 1   • CALCIUM PO Take  by mouth.     • clopidogrel (PLAVIX) 75 MG tablet Take 75 mg by mouth Daily.     • gabapentin (NEURONTIN) 100 MG capsule 1 po nightly x 14 days, then 1 po twice daily for back pain 60 capsule 2   • ketoconazole (NIZORAL) 2 % cream Apply  topically Daily. Apply to feet and toes with dressing changes 80 g 2   • levothyroxine (SYNTHROID, LEVOTHROID) 75 MCG tablet Take 1 tablet by mouth Daily. 30 tablet 6   • liothyronine (CYTOMEL) 5 MCG tablet TAKE ONE & ONE-HALF TABLETS BY MOUTH ONCE DAILY 45 tablet 5   • metoprolol succinate XL (TOPROL-XL) 100 MG 24 hr tablet Take 0.5 tablets by mouth Daily. 30 tablet 5   • nitroglycerin (NITROLINGUAL) 0.4 MG/SPRAY spray Nitroglycerin 0.4 MG/SPRAY Translingual Solution; Patient Sig: Nitroglycerin 0.4 MG/SPRAY Translingual Solution USE AS DIRECTED.; 0; 11-Sep-2013; Active     • pantoprazole (PROTONIX) 40 MG EC tablet Take 40 mg by mouth Daily.     • Polyethylene Glycol 3350 granules Take  by mouth daily.     • pravastatin (PRAVACHOL) 20 MG tablet Take 1 tablet by mouth Daily. 30 tablet 5   • sodium hypochlorite (DAKIN'S) 0.2-0.25 % topical solution Use daily as per wound care instructions 473 mL 2   • terbinafine (lamiSIL) 250 MG tablet Take 1 tablet by mouth Daily. 28 tablet 0   • traZODone (DESYREL) 50 MG tablet Take 1 tablet by mouth Every Night. 30 tablet 2   • urea (CARMOL) 40 % cream Apply  topically Daily. To lower extremities and feet 198 g 5   • urea (CARMOL) 40 % cream Apply  topically Daily. Apply to feet and toes (avoid  wounds);  Apply with dressing changes 198 g 1     No current facility-administered medications on file prior to visit.        Objective   Vitals:    06/22/18 1540   BP: 98/70   BP Location: Left arm   Patient Position: Sitting   Cuff Size: Adult   Pulse: 56   SpO2: 98%   Weight: Comment: pt in wheelchair, unable to weigh       Physical Exam   Constitutional: She is oriented to person, place, and time. She appears well-developed and well-nourished.   Neurological: She is alert and oriented to person, place, and time.   Skin: Skin is warm and dry. Capillary refill takes less than 2 seconds.   Right ankle wound to subq 2x 4cm;  No redness or drainage;  Some cellular slough;  Right lateral leg 2cm x 2cm;  Hallux ulerative callous 0.5cm x 1cm;  2nd toe 0.5x0.5 early ulcerative callous.     Onycho of nails  Edema greatly improved;  Wound healing and improving;  Dorsum of foot healed.  Cracking and peeling improved greatly.  +tinea.  Pedal pulses intact.   Psychiatric: She has a normal mood and affect. Her behavior is normal.   Nursing note and vitals reviewed.    Applied silvadene and covered wounds non-stick guaze, sterlie kerlix and ace wraps for compression.    Assessment/Plan   Lisa was seen today for follow-up.    Diagnoses and all orders for this visit:    Lymphedema    Lower extremity edema    Encounter for wound care      -continue wound care as doing, healing slowly;  Complicated as feet down long periods of time  -up to patient on renal referral.  Has improved fortunately, suspect ATN at time         -Follow up: 3 weeks

## 2018-06-28 RX ORDER — PRAVASTATIN SODIUM 20 MG
TABLET ORAL
Qty: 30 TABLET | Refills: 5 | Status: SHIPPED | OUTPATIENT
Start: 2018-06-28 | End: 2019-01-02 | Stop reason: SDUPTHER

## 2018-07-16 ENCOUNTER — OFFICE VISIT (OUTPATIENT)
Dept: FAMILY MEDICINE CLINIC | Facility: CLINIC | Age: 83
End: 2018-07-16

## 2018-07-16 VITALS
TEMPERATURE: 97.8 F | SYSTOLIC BLOOD PRESSURE: 120 MMHG | DIASTOLIC BLOOD PRESSURE: 60 MMHG | OXYGEN SATURATION: 97 % | HEART RATE: 60 BPM

## 2018-07-16 DIAGNOSIS — R60.0 LOWER EXTREMITY EDEMA: Primary | ICD-10-CM

## 2018-07-16 DIAGNOSIS — Z51.89 ENCOUNTER FOR WOUND CARE: ICD-10-CM

## 2018-07-16 DIAGNOSIS — I87.8 VENOUS STASIS: ICD-10-CM

## 2018-07-16 PROCEDURE — 99213 OFFICE O/P EST LOW 20 MIN: CPT | Performed by: FAMILY MEDICINE

## 2018-07-16 NOTE — PROGRESS NOTES
Subjective   Lisa Grant is a 89 y.o. female. Presents today for No chief complaint on file.      Wound Check   She was originally treated more than 14 days ago. Previous treatment included wound cleansing or irrigation. There has been no drainage from the wound. The redness has improved. The swelling has improved. The pain has improved.       Review of Systems   Constitutional: Negative for chills and fever.   Cardiovascular: Positive for leg swelling.       Patient Active Problem List   Diagnosis   • Paroxysmal atrial fibrillation (CMS/HCC)   • Angina pectoris (CMS/HCC)   • Anxiety   • Atherosclerosis of aorta (CMS/HCC)   • Coronary arteriosclerosis in native artery   • Benign essential hypertension   • Chronic kidney disease   • Claudication (CMS/HCC)   • Congestive heart failure (CMS/HCC)   • Constipation   • Type 2 diabetes mellitus with diabetic nephropathy (CMS/HCC)   • Dry skin dermatitis   • Dyslipidemia   • Gastroesophageal reflux disease with esophagitis   • Glaucoma   • Hypothyroidism   • Knee pain   • Onychomycosis   • Primary insomnia   • Tinea pedis   • Vitamin D deficiency       Social History     Social History   • Marital status:      Social History Main Topics   • Smoking status: Never Smoker   • Smokeless tobacco: Never Used   • Alcohol use No   • Drug use: No     Other Topics Concern   • Not on file       Allergies   Allergen Reactions   • Codeine    • Hydrocodone-Acetaminophen    • Metoclopramide    • Oxycodone    • Propoxyphene        Current Outpatient Prescriptions on File Prior to Visit   Medication Sig Dispense Refill   • alendronate (FOSAMAX) 70 MG tablet TAKE ONE TABLET BY MOUTH ONCE A WEEK 12 tablet 1   • amiodarone (PACERONE) 200 MG tablet Take 1 tablet by mouth Daily.     • ammonium lactate (AMLACTIN) 12 % cream APPLY CREAM TOPICALLY DAILY TO FEET AND TOES (AVOID WOUNDS). APPLY WITH DRESSING CHANGES 385 g 1   • aspirin 81 MG chewable tablet Chew 81 mg Daily.     • bumetanide  (BUMEX) 1 MG tablet Take 1 tablet by mouth Daily. (Patient taking differently: Take 0.5 mg by mouth Daily.) 60 tablet 1   • CALCIUM PO Take  by mouth.     • clopidogrel (PLAVIX) 75 MG tablet Take 75 mg by mouth Daily.     • gabapentin (NEURONTIN) 100 MG capsule 1 po nightly x 14 days, then 1 po twice daily for back pain 60 capsule 2   • ketoconazole (NIZORAL) 2 % cream Apply  topically Daily. Apply to feet and toes with dressing changes 80 g 2   • levothyroxine (SYNTHROID, LEVOTHROID) 75 MCG tablet Take 1 tablet by mouth Daily. 30 tablet 6   • liothyronine (CYTOMEL) 5 MCG tablet TAKE ONE & ONE-HALF TABLETS BY MOUTH ONCE DAILY 45 tablet 5   • metoprolol succinate XL (TOPROL-XL) 100 MG 24 hr tablet Take 0.5 tablets by mouth Daily. 30 tablet 5   • nitroglycerin (NITROLINGUAL) 0.4 MG/SPRAY spray Nitroglycerin 0.4 MG/SPRAY Translingual Solution; Patient Sig: Nitroglycerin 0.4 MG/SPRAY Translingual Solution USE AS DIRECTED.; 0; 11-Sep-2013; Active     • pantoprazole (PROTONIX) 40 MG EC tablet Take 40 mg by mouth Daily.     • Polyethylene Glycol 3350 granules Take  by mouth daily.     • pravastatin (PRAVACHOL) 20 MG tablet TAKE ONE TABLET BY MOUTH ONCE DAILY 30 tablet 5   • sodium hypochlorite (DAKIN'S) 0.2-0.25 % topical solution Use daily as per wound care instructions 473 mL 2   • terbinafine (lamiSIL) 250 MG tablet Take 1 tablet by mouth Daily. 28 tablet 0   • traZODone (DESYREL) 50 MG tablet Take 1 tablet by mouth Every Night. 30 tablet 2   • urea (CARMOL) 40 % cream Apply  topically Daily. To lower extremities and feet 198 g 5   • urea (CARMOL) 40 % cream Apply  topically Daily. Apply to feet and toes (avoid wounds);  Apply with dressing changes 198 g 1     No current facility-administered medications on file prior to visit.        Objective   Vitals:    07/16/18 1649   BP: 120/60   BP Location: Right arm   Patient Position: Sitting   Cuff Size: Adult   Pulse: 60   Temp: 97.8 °F (36.6 °C)   TempSrc: Oral   SpO2: 97%    Weight: Comment: pt in wheelchair, unable to weigh       Physical Exam   Constitutional: She is oriented to person, place, and time. She appears well-developed and well-nourished.   Musculoskeletal: She exhibits edema.   Neurological: She is alert and oriented to person, place, and time.   Skin: Skin is warm and dry.   Right lower extremity  Ulcerations resolved except ankle/dorsum of foot which has improved, now 2x3 cm.  Left 2nd toe, still 5mm necrotic tip, but overall cracking, dermatitis and likely fungal infection has improved some.   Applied silvadene and HC1% to worse areas of dermatitis, covered foot wound with xeroform gauze and wrapped both lower ext in sterile gauze then 6 inch ace wraps from toes to above knees with 50% overlap.     Psychiatric: She has a normal mood and affect. Her behavior is normal.   Nursing note and vitals reviewed.      Assessment/Plan   Diagnoses and all orders for this visit:    Lower extremity edema    Venous stasis    Encounter for wound care      Try to elevate legs more (edema has greatly improved from prior checks), include feet in wraps as most swelling in feet  Continue wound care as per wound care instructions.  Has [facility] nurse coming 3 times a week       -Follow up: 4 weeks and prn

## 2018-07-19 ENCOUNTER — TELEPHONE (OUTPATIENT)
Dept: FAMILY MEDICINE CLINIC | Facility: CLINIC | Age: 83
End: 2018-07-19

## 2018-07-20 ENCOUNTER — TELEPHONE (OUTPATIENT)
Dept: FAMILY MEDICINE CLINIC | Facility: CLINIC | Age: 83
End: 2018-07-20

## 2018-07-25 ENCOUNTER — TELEPHONE (OUTPATIENT)
Dept: FAMILY MEDICINE CLINIC | Facility: CLINIC | Age: 83
End: 2018-07-25

## 2018-07-25 NOTE — TELEPHONE ENCOUNTER
NOTHING NEW NOTED ON HER OV NOTE. SHE SAID THE PERSON THAT EVALUATED HER PACEMAKER TOLD HER THERE IS SOMETHING WRONG WITH HER HEART. I TOLD HER I DO NOT HAVE THEIR REPORT AND THAT SHE SHOULD CONTACT DR DIXON'S OFFICE TO SEE IF THEY HAVE THAT REPORT.

## 2018-08-08 ENCOUNTER — TELEPHONE (OUTPATIENT)
Dept: FAMILY MEDICINE CLINIC | Facility: CLINIC | Age: 83
End: 2018-08-08

## 2018-08-09 NOTE — TELEPHONE ENCOUNTER
Pt made appt for Mon, 8/13 at 1:45PM.    Per Mary, from [facility], pt's legs and feet look good, not swollen or warm. Mary said pt is complaining of increased pain in her right leg along shin bone. Pt said it feels like a shooting pain and there is about a 2 1/2 inch long red line along her shin bone.

## 2018-08-13 ENCOUNTER — OFFICE VISIT (OUTPATIENT)
Dept: FAMILY MEDICINE CLINIC | Facility: CLINIC | Age: 83
End: 2018-08-13

## 2018-08-13 DIAGNOSIS — Z00.00 MEDICARE ANNUAL WELLNESS VISIT, INITIAL: Primary | ICD-10-CM

## 2018-08-13 DIAGNOSIS — I48.0 PAROXYSMAL ATRIAL FIBRILLATION (HCC): ICD-10-CM

## 2018-08-13 DIAGNOSIS — I25.10 CORONARY ARTERIOSCLEROSIS IN NATIVE ARTERY: ICD-10-CM

## 2018-08-13 DIAGNOSIS — E11.21 TYPE 2 DIABETES MELLITUS WITH DIABETIC NEPHROPATHY, WITHOUT LONG-TERM CURRENT USE OF INSULIN (HCC): ICD-10-CM

## 2018-08-13 DIAGNOSIS — N18.30 STAGE 3 CHRONIC KIDNEY DISEASE (HCC): ICD-10-CM

## 2018-08-13 DIAGNOSIS — I10 BENIGN ESSENTIAL HYPERTENSION: ICD-10-CM

## 2018-08-13 DIAGNOSIS — E03.8 OTHER SPECIFIED HYPOTHYROIDISM: ICD-10-CM

## 2018-08-13 DIAGNOSIS — I20.9 ANGINA PECTORIS (HCC): ICD-10-CM

## 2018-08-13 DIAGNOSIS — Z23 IMMUNIZATION DUE: ICD-10-CM

## 2018-08-13 PROCEDURE — G0438 PPPS, INITIAL VISIT: HCPCS | Performed by: FAMILY MEDICINE

## 2018-08-13 NOTE — PATIENT INSTRUCTIONS
Medicare Wellness  Personal Prevention Plan of Service     Date of Office Visit:  2018  Encounter Provider:  Joaquín Reich DO  Place of Service:  [facility]  Patient Name: Lisa Grant  :  1928    As part of the Medicare Wellness portion of your visit today, we are providing you with this personalized preventive plan of services (PPPS). This plan is based upon recommendations of the [place] Preventive Services Task Force (USPSTF) and the Advisory Committee on Immunization Practices (ACIP).    This lists the preventive care services that should be considered, and provides dates of when you are due. Items listed as completed are up-to-date and do not require any further intervention.    Health Maintenance   Topic Date Due   • TDAP/TD VACCINES (1 - Tdap) 1947   • PNEUMOCOCCAL VACCINES (65+ LOW/MEDIUM RISK) (1 of 2 - PCV13) 1993   • ZOSTER VACCINE (2 of 2) 2013   • INFLUENZA VACCINE  2018   • HEMOGLOBIN A1C  2019   • URINE MICROALBUMIN  2019   • MAMMOGRAM  2019   • MEDICARE ANNUAL WELLNESS  2019   • DXA SCAN  2019       Orders Placed This Encounter   Procedures   • Comprehensive Metabolic Panel   • Hemoglobin A1c   • Lipid Panel   • T3, Free   • T4, Free   • TSH       No Follow-up on file.      Chronic Kidney Disease, Adult  Chronic kidney disease (CKD) occurs when the kidneys become damaged slowly over a long period of time. The kidneys are a pair of organs that do many important jobs in the body, including:  · Removing waste and extra fluid from the blood to make urine.  · Making hormones that maintain the amount of fluid in tissues and blood vessels.  · Maintaining the right amount of fluids and chemicals in the body.    A small amount of kidney damage may not cause problems, but a large amount of damage may make it hard or impossible for the kidneys to work the way they should. If steps are not taken to  slow down kidney damage or to stop it from getting worse, the kidneys may stop working permanently (end-stage renal disease or ESRD). Most of the time, CKD does not go away, but it can often be controlled. People who have CKD are usually able to live normal lives.  What are the causes?  The most common causes of this condition are diabetes and high blood pressure (hypertension). Other causes include:  · Heart and blood vessel (cardiovascular) disease.  · Kidney diseases, such as:  ? Glomerulonephritis.  ? Interstitial nephritis.  ? Polycystic kidney disease.  ? Renal vascular disease.  · Diseases that affect the immune system.  · Genetic diseases.  · Medicines that damage the kidneys, such as anti-inflammatory medicines.  · Being around or being in contact with poisonous (toxic) substances.  · A kidney or urinary infection that occurs again and again (recurs).  · Vasculitis. This is swelling or inflammation of the blood vessels.  · A problem with urine flow that may be caused by:  ? Cancer.  ? Having kidney stones more than one time.  ? An enlarged prostate, in males.    What increases the risk?  You are more likely to develop this condition if you:  · Are older than age 60.  · Are female.  · Are -American, , , , or .  · Are a current or former smoker.  · Are obese.  · Have a family history of kidney disease or failure.  · Often take medicines that are damaging to the kidneys.    What are the signs or symptoms?  Symptoms of this condition include:  · Swelling (edema) of the face, legs, ankles, or feet.  · Tiredness (lethargy) and having less energy.  · Nausea or vomiting.  · Confusion or trouble concentrating.  · Problems with urination, such as:  ? Painful or burning feeling during urination.  ? Decreased urine production.  ? Frequent urination, especially at night.  ? Bloody urine.  · Muscle twitches and cramps, especially in the legs.  · Shortness of  breath.  · Weakness.  · Loss of appetite.  · Metallic taste in the mouth.  · Trouble sleeping.  · Dry, itchy skin.  · A low blood count (anemia).  · Pale lining of the eyelids and surface of the eye (conjunctiva).    Symptoms develop slowly and may not be obvious until the kidney damage becomes severe. It is possible to have kidney disease for years without having any symptoms.  How is this diagnosed?  This condition may be diagnosed based on:  · Blood tests.  · Urine tests.  · Imaging tests, such as an ultrasound or CT scan.  · A test in which a sample of tissue is removed from the kidneys to be examined under a microscope (kidney biopsy).    These test results will help your health care provider determine how serious the CKD is.  How is this treated?  There is no cure for most cases of this condition, but treatment usually relieves symptoms and prevents or slows the progression of the disease. Treatment may include:  · Making diet changes, which may require you to avoid alcohol, salty foods (sodium), and foods that are high in potassium, calcium, and protein.  · Medicines:  ? To lower blood pressure.  ? To control blood glucose.  ? To relieve anemia.  ? To relieve swelling.  ? To protect your bones.  ? To improve the balance of electrolytes in your blood.  · Removing toxic waste from the body through types of dialysis, if the kidneys can no longer do their job (kidney failure).  · Managing any other conditions that are causing your CKD or making it worse.    Follow these instructions at home:  Medicines  · Take over-the-counter and prescription medicines only as told by your health care provider. The dose of some medicines that you take may need to be adjusted.  · Do not take any new medicines unless approved by your health care provider. Many medicines can worsen your kidney damage.  · Do not take any vitamin and mineral supplements unless approved by your health care provider. Many nutritional supplements can  worsen your kidney damage.  General instructions  · Follow your prescribed diet as told by your health care provider.  · Do not use any products that contain nicotine or tobacco, such as cigarettes and e-cigarettes. If you need help quitting, ask your health care provider.  · Monitor and track your blood pressure at home. Report changes in your blood pressure as told by your health care provider.  · If you are being treated for diabetes, monitor and track your blood sugar (blood glucose) levels as told by your health care provider.  · Maintain a healthy weight. If you need help with this, ask your health care provider.  · Start or continue an exercise plan. Exercise at least 30 minutes a day, 5 days a week.  · Keep your immunizations up to date as told by your health care provider.  · Keep all follow-up visits as told by your health care provider. This is important.  Where to find more information:  · American Association of Kidney Patients: www.aakp.org  · National Kidney Foundation: www.kidney.org  · American Kidney Fund: www.akfinc.org  · [facility]: www.lifeoptions.org and www.kidneyschool.org  Contact a health care provider if:  · Your symptoms get worse.  · You develop new symptoms.  Get help right away if:  · You develop symptoms of ESRD, which include:  ? Headaches.  ? Numbness in the hands or feet.  ? Easy bruising.  ? Frequent hiccups.  ? Chest pain.  ? Shortness of breath.  ? Lack of menstruation, in women.  · You have a fever.  · You have decreased urine production.  · You have pain or bleeding when you urinate.  Summary  · Chronic kidney disease (CKD) occurs when the kidneys become damaged slowly over a long period of time.  · The most common causes of this condition are diabetes and high blood pressure (hypertension).  · There is no cure for most cases of this condition, but treatment usually relieves symptoms and prevents or slows the progression of the disease. Treatment may  include a combination of medicines and lifestyle changes.  This information is not intended to replace advice given to you by your health care provider. Make sure you discuss any questions you have with your health care provider.  Document Released: 09/26/2009 Document Revised: 01/25/2018 Document Reviewed: 01/25/2018  [employer] Interactive Patient Education © 2018 [employer]    Heart Failure  Heart failure means your heart has trouble pumping blood. This makes it hard for your body to work well. Heart failure is usually a long-term (chronic) condition. You must take good care of yourself and follow your doctor's treatment plan.  Follow these instructions at home:  · Take your heart medicine as told by your doctor.  ? Do not stop taking medicine unless your doctor tells you to.  ? Do not skip any dose of medicine.  ? Refill your medicines before they run out.  ? Take other medicines only as told by your doctor or pharmacist.  · Stay active if told by your doctor. The elderly and people with severe heart failure should talk with a doctor about physical activity.  · Eat heart-healthy foods. Choose foods that are without trans fat and are low in saturated fat, cholesterol, and salt (sodium). This includes fresh or frozen fruits and vegetables, fish, lean meats, fat-free or low-fat dairy foods, whole grains, and high-fiber foods. Lentils and dried peas and beans (legumes) are also good choices.  · Limit salt if told by your doctor.  · Cook in a healthy way. Roast, grill, broil, bake, poach, steam, or stir-mendiola foods.  · Limit fluids as told by your doctor.  · Weigh yourself every morning. Do this after you pee (urinate) and before you eat breakfast. Write down your weight to give to your doctor.  · Take your blood pressure and write it down if your doctor tells you to.  · Ask your doctor how to check your pulse. Check your pulse as told.  · Lose weight if told by your doctor.  · Stop smoking or chewing tobacco. Do not  use gum or patches that help you quit without your doctor's approval.  · Schedule and go to doctor visits as told.  · Nonpregnant women should have no more than 1 drink a day. Men should have no more than 2 drinks a day. Talk to your doctor about drinking alcohol.  · Stop illegal drug use.  · Stay current with shots (immunizations).  · Manage your health conditions as told by your doctor.  · Learn to manage your stress.  · Rest when you are tired.  · If it is really hot outside:  ? Avoid intense activities.  ? Use air conditioning or fans, or get in a cooler place.  ? Avoid caffeine and alcohol.  ? Wear loose-fitting, lightweight, and light-colored clothing.  · If it is really cold outside:  ? Avoid intense activities.  ? Layer your clothing.  ? Wear mittens or gloves, a hat, and a scarf when going outside.  ? Avoid alcohol.  · Learn about heart failure and get support as needed.  · Get help to maintain or improve your quality of life and your ability to care for yourself as needed.  Contact a doctor if:  · You gain weight quickly.  · You are more short of breath than usual.  · You cannot do your normal activities.  · You tire easily.  · You cough more than normal, especially with activity.  · You have any or more puffiness (swelling) in areas such as your hands, feet, ankles, or belly (abdomen).  · You cannot sleep because it is hard to breathe.  · You feel like your heart is beating fast (palpitations).  · You get dizzy or light-headed when you stand up.  Get help right away if:  · You have trouble breathing.  · There is a change in mental status, such as becoming less alert or not being able to focus.  · You have chest pain or discomfort.  · You faint.  This information is not intended to replace advice given to you by your health care provider. Make sure you discuss any questions you have with your health care provider.  Document Released: 09/26/2009 Document Revised: 05/25/2017 Document Reviewed:  "02/03/2014  [employer] Interactive Patient Education © 2017 [employer]    Carbohydrate Counting for Diabetes Mellitus, Adult  Carbohydrate counting is a method for keeping track of how many carbohydrates you eat. Eating carbohydrates naturally increases the amount of sugar (glucose) in the blood. Counting how many carbohydrates you eat helps keep your blood glucose within normal limits, which helps you manage your diabetes (diabetes mellitus).  It is important to know how many carbohydrates you can safely have in each meal. This is different for every person. A diet and nutrition specialist (registered dietitian) can help you make a meal plan and calculate how many carbohydrates you should have at each meal and snack.  Carbohydrates are found in the following foods:  · Grains, such as breads and cereals.  · Dried beans and soy products.  · Starchy vegetables, such as potatoes, peas, and corn.  · Fruit and fruit juices.  · Milk and yogurt.  · Sweets and snack foods, such as cake, cookies, candy, chips, and soft drinks.    How do I count carbohydrates?  There are two ways to count carbohydrates in food. You can use either of the methods or a combination of both.  Reading \"Nutrition Facts\" on packaged food  The \"Nutrition Facts\" list is included on the labels of almost all packaged foods and beverages in the [place] It includes:  · The serving size.  · Information about nutrients in each serving, including the grams (g) of carbohydrate per serving.    To use the “Nutrition Facts\":  · Decide how many servings you will have.  · Multiply the number of servings by the number of carbohydrates per serving.  · The resulting number is the total amount of carbohydrates that you will be having.    Learning standard serving sizes of other foods  When you eat foods containing carbohydrates that are not packaged or do not include \"Nutrition Facts\" on the label, you need to measure the servings in order to count the amount of " carbohydrates:  · Measure the foods that you will eat with a food scale or measuring cup, if needed.  · Decide how many standard-size servings you will eat.  · Multiply the number of servings by 15. Most carbohydrate-rich foods have about 15 g of carbohydrates per serving.  ? For example, if you eat 8 oz (170 g) of strawberries, you will have eaten 2 servings and 30 g of carbohydrates (2 servings x 15 g = 30 g).  · For foods that have more than one food mixed, such as soups and casseroles, you must count the carbohydrates in each food that is included.    The following list contains standard serving sizes of common carbohydrate-rich foods. Each of these servings has about 15 g of carbohydrates:  · ½ hamburger bun or ½ English muffin.  · ½ oz (15 mL) syrup.  · ½ oz (14 g) jelly.  · 1 slice of bread.  · 1 six-inch tortilla.  · 3 oz (85 g) cooked rice or pasta.  · 4 oz (113 g) cooked dried beans.  · 4 oz (113 g) starchy vegetable, such as peas, corn, or potatoes.  · 4 oz (113 g) hot cereal.  · 4 oz (113 g) mashed potatoes or ¼ of a large baked potato.  · 4 oz (113 g) canned or frozen fruit.  · 4 oz (120 mL) fruit juice.  · 4-6 crackers.  · 6 chicken nuggets.  · 6 oz (170 g) unsweetened dry cereal.  · 6 oz (170 g) plain fat-free yogurt or yogurt sweetened with artificial sweeteners.  · 8 oz (240 mL) milk.  · 8 oz (170 g) fresh fruit or one small piece of fruit.  · 24 oz (680 g) popped popcorn.    Example of carbohydrate counting  Sample meal  · 3 oz (85 g) chicken breast.  · 6 oz (170 g) brown rice.  · 4 oz (113 g) corn.  · 8 oz (240 mL) milk.  · 8 oz (170 g) strawberries with sugar-free whipped topping.  Carbohydrate calculation  1. Identify the foods that contain carbohydrates:  ? Rice.  ? Corn.  ? Milk.  ? Strawberries.  2. Calculate how many servings you have of each food:  ? 2 servings rice.  ? 1 serving corn.  ? 1 serving milk.  ? 1 serving strawberries.  3. Multiply each number of servings by 15 g:  ? 2 servings  rice x 15 g = 30 g.  ? 1 serving corn x 15 g = 15 g.  ? 1 serving milk x 15 g = 15 g.  ? 1 serving strawberries x 15 g = 15 g.  4. Add together all of the amounts to find the total grams of carbohydrates eaten:  ? 30 g + 15 g + 15 g + 15 g = 75 g of carbohydrates total.  This information is not intended to replace advice given to you by your health care provider. Make sure you discuss any questions you have with your health care provider.  Document Released: 12/18/2006 Document Revised: 07/07/2017 Document Reviewed: 05/31/2017  [employer] Interactive Patient Education © 2018 [employer]    Advance Directive  Advance directives are legal documents that let you make choices ahead of time about your health care and medical treatment in case you become unable to communicate for yourself. Advance directives are a way for you to communicate your wishes to family, friends, and health care providers. This can help convey your decisions about end-of-life care if you become unable to communicate.  Discussing and writing advance directives should happen over time rather than all at once. Advance directives can be changed depending on your situation and what you want, even after you have signed the advance directives.  If you do not have an advance directive, some states assign family decision makers to act on your behalf based on how closely you are related to them. Each state has its own laws regarding advance directives. You may want to check with your health care provider, , or state representative about the laws in your state. There are different types of advance directives, such as:  · Medical power of .  · Living will.  · Do not resuscitate (DNR) or do not attempt resuscitation (DNAR) order.    Health care proxy and medical power of   A health care proxy, also called a health care agent, is a person who is appointed to make medical decisions for you in cases in which you are unable to make the  decisions yourself. Generally, people choose someone they know well and trust to represent their preferences. Make sure to ask this person for an agreement to act as your proxy. A proxy may have to exercise judgment in the event of a medical decision for which your wishes are not known.  A medical power of  is a legal document that names your health care proxy. Depending on the laws in your state, after the document is written, it may also need to be:  · Signed.  · Notarized.  · Dated.  · Copied.  · Witnessed.  · Incorporated into your medical record.    You may also want to appoint someone to manage your financial affairs in a situation in which you are unable to do so. This is called a durable power of  for finances. It is a separate legal document from the durable power of  for health care. You may choose the same person or someone different from your health care proxy to act as your agent in financial matters.  If you do not appoint a proxy, or if there is a concern that the proxy is not acting in your best interests, a court-appointed guardian may be designated to act on your behalf.  Living will  A living will is a set of instructions documenting your wishes about medical care when you cannot express them yourself. Health care providers should keep a copy of your living will in your medical record. You may want to give a copy to family members or friends. To alert caregivers in case of an emergency, you can place a card in your wallet to let them know that you have a living will and where they can find it. A living will is used if you become:  · Terminally ill.  · Incapacitated.  · Unable to communicate or make decisions.    Items to consider in your living will include:  · The use or non-use of life-sustaining equipment, such as dialysis machines and breathing machines (ventilators).  · A DNR or DNAR order, which is the instruction not to use cardiopulmonary resuscitation (CPR) if  breathing or heartbeat stops.  · The use or non-use of tube feeding.  · Withholding of food and fluids.  · Comfort (palliative) care when the goal becomes comfort rather than a cure.  · Organ and tissue donation.    A living will does not give instructions for distributing your money and property if you should pass away. It is recommended that you seek the advice of a  when writing a will. Decisions about taxes, beneficiaries, and asset distribution will be legally binding. This process can relieve your family and friends of any concerns surrounding disputes or questions that may come up about the distribution of your assets.  DNR or DNAR  A DNR or DNAR order is a request not to have CPR in the event that your heart stops beating or you stop breathing. If a DNR or DNAR order has not been made and shared, a health care provider will try to help any patient whose heart has stopped or who has stopped breathing. If you plan to have surgery, talk with your health care provider about how your DNR or DNAR order will be followed if problems occur.  Summary  · Advance directives are the legal documents that allow you to make choices ahead of time about your health care and medical treatment in case you become unable to communicate for yourself.  · The process of discussing and writing advance directives should happen over time. You can change the advance directives, even after you have signed them.  · Advance directives include DNR or DNAR orders, living bach, and designating an agent as your medical power of .  This information is not intended to replace advice given to you by your health care provider. Make sure you discuss any questions you have with your health care provider.  Document Released: 03/26/2009 Document Revised: 11/06/2017 Document Reviewed: 11/06/2017  [employer] Interactive Patient Education © 2017 [employer]

## 2018-08-13 NOTE — PROGRESS NOTES
QUICK REFERENCE INFORMATION:  The ABCs of the Annual Wellness Visit    Initial Medicare Wellness Visit    HEALTH RISK ASSESSMENT    1/19/1928    Recent Hospitalizations:  No hospitalization(s) within the last year..        Current Medical Providers:  Patient Care Team:  Joaquín Reich DO as PCP - General  Joaquín Reich DO as PCP - Claims Attributed        Smoking Status:  History   Smoking Status   • Never Smoker   Smokeless Tobacco   • Never Used       Alcohol Consumption:  History   Alcohol Use No       Depression Screen:   PHQ-2/PHQ-9 Depression Screening 8/13/2018   Little interest or pleasure in doing things 0   Feeling down, depressed, or hopeless 0   Trouble falling or staying asleep, or sleeping too much 1   Feeling tired or having little energy 0   Poor appetite or overeating 1   Feeling bad about yourself - or that you are a failure or have let yourself or your family down 0   Trouble concentrating on things, such as reading the newspaper or watching television 0   Moving or speaking so slowly that other people could have noticed. Or the opposite - being so fidgety or restless that you have been moving around a lot more than usual 0   Thoughts that you would be better off dead, or of hurting yourself in some way 0   Total Score 2       Health Habits and Functional and Cognitive Screening:  Functional & Cognitive Status 8/13/2018   Do you have difficulty preparing food and eating? Yes   Do you have difficulty bathing yourself, getting dressed or grooming yourself? No   Do you have difficulty using the toilet? No   Do you have difficulty moving around from place to place? Yes   Do you have trouble with steps or getting out of a bed or a chair? Yes   In the past year have you fallen or experienced a near fall? Yes   Current Diet Limited Junk Food   Dental Exam Not up to date   Eye Exam Up to date   Exercise (times per week) 0 times per week   Current Exercise Activities Include None   Do you need  help using the phone?  No   Are you deaf or do you have serious difficulty hearing?  No   Do you need help with transportation? Yes   Do you need help shopping? Yes   Do you need help preparing meals?  Yes   Do you need help with housework?  Yes   Do you need help with laundry? Yes   Do you need help taking your medications? Yes   Do you need help managing money? Yes   Do you ever drive or ride in a car without wearing a seat belt? No   Have you felt unusual stress, anger or loneliness in the last month? No   Who do you live with? Alone   If you need help, do you have trouble finding someone available to you? Yes   Have you been bothered in the last four weeks by sexual problems? No   Do you have difficulty concentrating, remembering or making decisions? No           Does the patient have evidence of cognitive impairment? Yes  1 of 3 on recall  Asiprin use counseling: Taking ASA appropriately as indicated      Recent Lab Results:    Visual Acuity:  No exam data present    Age-appropriate Screening Schedule:  Refer to the list below for future screening recommendations based on patient's age, sex and/or medical conditions. Orders for these recommended tests are listed in the plan section. The patient has been provided with a written plan.    Health Maintenance   Topic Date Due   • TDAP/TD VACCINES (1 - Tdap) 01/19/1947   • PNEUMOCOCCAL VACCINES (65+ LOW/MEDIUM RISK) (1 of 2 - PCV13) 01/19/1993   • ZOSTER VACCINE (2 of 2) 04/27/2013   • INFLUENZA VACCINE  08/01/2018   • HEMOGLOBIN A1C  02/13/2019   • URINE MICROALBUMIN  03/02/2019   • MAMMOGRAM  05/31/2019   • DXA SCAN  09/14/2019        Subjective   History of Present Illness    Lisa Grant is a 89 y.o. female who presents for an Annual Wellness Visit.    The following portions of the patient's history were reviewed and updated as appropriate: allergies, current medications, past family history, past medical history, past social history, past surgical history and  problem list.    Outpatient Medications Prior to Visit   Medication Sig Dispense Refill   • alendronate (FOSAMAX) 70 MG tablet TAKE ONE TABLET BY MOUTH ONCE A WEEK 12 tablet 1   • amiodarone (PACERONE) 200 MG tablet Take 1 tablet by mouth Daily.     • ammonium lactate (AMLACTIN) 12 % cream APPLY CREAM TOPICALLY DAILY TO FEET AND TOES (AVOID WOUNDS). APPLY WITH DRESSING CHANGES 385 g 1   • aspirin 81 MG chewable tablet Chew 81 mg Daily.     • bumetanide (BUMEX) 1 MG tablet Take 1 tablet by mouth Daily. (Patient taking differently: Take 0.5 mg by mouth Daily.) 60 tablet 1   • CALCIUM PO Take  by mouth.     • clopidogrel (PLAVIX) 75 MG tablet Take 75 mg by mouth Daily.     • gabapentin (NEURONTIN) 100 MG capsule 1 po nightly x 14 days, then 1 po twice daily for back pain 60 capsule 2   • ketoconazole (NIZORAL) 2 % cream Apply  topically Daily. Apply to feet and toes with dressing changes 80 g 2   • levothyroxine (SYNTHROID, LEVOTHROID) 75 MCG tablet Take 1 tablet by mouth Daily. 30 tablet 6   • liothyronine (CYTOMEL) 5 MCG tablet TAKE ONE & ONE-HALF TABLETS BY MOUTH ONCE DAILY 45 tablet 5   • metoprolol succinate XL (TOPROL-XL) 100 MG 24 hr tablet Take 0.5 tablets by mouth Daily. 30 tablet 5   • nitroglycerin (NITROLINGUAL) 0.4 MG/SPRAY spray Nitroglycerin 0.4 MG/SPRAY Translingual Solution; Patient Sig: Nitroglycerin 0.4 MG/SPRAY Translingual Solution USE AS DIRECTED.; 0; 11-Sep-2013; Active     • pantoprazole (PROTONIX) 40 MG EC tablet Take 40 mg by mouth Daily.     • Polyethylene Glycol 3350 granules Take  by mouth daily.     • pravastatin (PRAVACHOL) 20 MG tablet TAKE ONE TABLET BY MOUTH ONCE DAILY 30 tablet 5   • sodium hypochlorite (DAKIN'S) 0.2-0.25 % topical solution Use daily as per wound care instructions 473 mL 2   • terbinafine (lamiSIL) 250 MG tablet Take 1 tablet by mouth Daily. 28 tablet 0   • traZODone (DESYREL) 50 MG tablet Take 1 tablet by mouth Every Night. 30 tablet 2   • urea (CARMOL) 40 % cream  Apply  topically Daily. To lower extremities and feet 198 g 5   • urea (CARMOL) 40 % cream Apply  topically Daily. Apply to feet and toes (avoid wounds);  Apply with dressing changes 198 g 1     No facility-administered medications prior to visit.        Patient Active Problem List   Diagnosis   • Paroxysmal atrial fibrillation (CMS/HCC)   • Angina pectoris (CMS/HCC)   • Anxiety   • Atherosclerosis of aorta (CMS/HCC)   • Coronary arteriosclerosis in native artery   • Benign essential hypertension   • Chronic kidney disease   • Claudication (CMS/HCC)   • Congestive heart failure (CMS/HCC)   • Constipation   • Type 2 diabetes mellitus with diabetic nephropathy (CMS/HCC)   • Dry skin dermatitis   • Dyslipidemia   • Gastroesophageal reflux disease with esophagitis   • Glaucoma   • Hypothyroidism   • Knee pain   • Onychomycosis   • Primary insomnia   • Tinea pedis   • Vitamin D deficiency       Advance Care Planning:  has NO advance directive - information provided to the patient today    Identification of Risk Factors:  Risk factors include: wound care;  hx of chf, cad, a. fib, ckd, htn, hld and dm2..    Review of Systems   Respiratory: Negative for shortness of breath.    Cardiovascular: Positive for leg swelling. Negative for chest pain and palpitations.   Gastrointestinal: Negative for abdominal pain.   Neurological: Negative for syncope.       Compared to one year ago, the patient feels her physical health is the same.  Compared to one year ago, the patient feels her mental health is the same.    Objective     Physical Exam   Constitutional: She appears well-developed and well-nourished.   HENT:   Head: Normocephalic and atraumatic.   Neck: Neck supple. No JVD present. No thyromegaly present.   Cardiovascular: Normal rate, regular rhythm and normal heart sounds.  Exam reveals no gallop and no friction rub.    No murmur heard.  Pulmonary/Chest: Effort normal and breath sounds normal. No respiratory distress. She has  no wheezes. She has no rales.   Abdominal: Soft. Bowel sounds are normal. She exhibits no distension. There is no tenderness. There is no rebound and no guarding.   Musculoskeletal: She exhibits edema.   Neurological: She is alert.   Skin: Skin is warm and dry.   Skin cracking legs;  Patient dorsum of foot wound nearly healed. Tip of toe some eschar, better.  Wrapped legs   Psychiatric: She has a normal mood and affect. Her behavior is normal.   Nursing note and vitals reviewed.      There were no vitals filed for this visit.    Patient's There is no height or weight on file to calculate BMI. BMI is within normal parameters. No follow-up required.      Assessment/Plan   Patient Self-Management and Personalized Health Advice  The patient has been provided with information about: wound care in home and preventive services including:   · Advance directive.    Visit Diagnoses:    ICD-10-CM ICD-9-CM   1. Medicare annual wellness visit, initial Z00.00 V70.0   2. Type 2 diabetes mellitus with diabetic nephropathy, without long-term current use of insulin (CMS/HCC) E11.21 250.40     583.81   3. Angina pectoris (CMS/[facility]) I20.9 413.9   4. Paroxysmal atrial fibrillation (CMS/[facility]) I48.0 427.31   5. Stage 3 chronic kidney disease N18.3 585.3   6. Coronary arteriosclerosis in native artery I25.10 414.01   7. Benign essential hypertension I10 401.1   8. Other specified hypothyroidism E03.8 244.8   9. Immunization due Z23 V05.9       Orders Placed This Encounter   Procedures   • Comprehensive Metabolic Panel   • Hemoglobin A1c   • Lipid Panel   • T3, Free   • T4, Free   • TSH       Outpatient Encounter Prescriptions as of 8/13/2018   Medication Sig Dispense Refill   • alendronate (FOSAMAX) 70 MG tablet TAKE ONE TABLET BY MOUTH ONCE A WEEK 12 tablet 1   • amiodarone (PACERONE) 200 MG tablet Take 1 tablet by mouth Daily.     • ammonium lactate (AMLACTIN) 12 % cream APPLY CREAM TOPICALLY DAILY TO FEET AND TOES (AVOID WOUNDS). APPLY  WITH DRESSING CHANGES 385 g 1   • aspirin 81 MG chewable tablet Chew 81 mg Daily.     • bumetanide (BUMEX) 1 MG tablet Take 1 tablet by mouth Daily. (Patient taking differently: Take 0.5 mg by mouth Daily.) 60 tablet 1   • CALCIUM PO Take  by mouth.     • clopidogrel (PLAVIX) 75 MG tablet Take 75 mg by mouth Daily.     • gabapentin (NEURONTIN) 100 MG capsule 1 po nightly x 14 days, then 1 po twice daily for back pain 60 capsule 2   • ketoconazole (NIZORAL) 2 % cream Apply  topically Daily. Apply to feet and toes with dressing changes 80 g 2   • levothyroxine (SYNTHROID, LEVOTHROID) 75 MCG tablet Take 1 tablet by mouth Daily. 30 tablet 6   • liothyronine (CYTOMEL) 5 MCG tablet TAKE ONE & ONE-HALF TABLETS BY MOUTH ONCE DAILY 45 tablet 5   • metoprolol succinate XL (TOPROL-XL) 100 MG 24 hr tablet Take 0.5 tablets by mouth Daily. 30 tablet 5   • nitroglycerin (NITROLINGUAL) 0.4 MG/SPRAY spray Nitroglycerin 0.4 MG/SPRAY Translingual Solution; Patient Sig: Nitroglycerin 0.4 MG/SPRAY Translingual Solution USE AS DIRECTED.; 0; 11-Sep-2013; Active     • pantoprazole (PROTONIX) 40 MG EC tablet Take 40 mg by mouth Daily.     • Polyethylene Glycol 3350 granules Take  by mouth daily.     • pravastatin (PRAVACHOL) 20 MG tablet TAKE ONE TABLET BY MOUTH ONCE DAILY 30 tablet 5   • sodium hypochlorite (DAKIN'S) 0.2-0.25 % topical solution Use daily as per wound care instructions 473 mL 2   • terbinafine (lamiSIL) 250 MG tablet Take 1 tablet by mouth Daily. 28 tablet 0   • traZODone (DESYREL) 50 MG tablet Take 1 tablet by mouth Every Night. 30 tablet 2   • urea (CARMOL) 40 % cream Apply  topically Daily. To lower extremities and feet 198 g 5   • urea (CARMOL) 40 % cream Apply  topically Daily. Apply to feet and toes (avoid wounds);  Apply with dressing changes 198 g 1     Facility-Administered Encounter Medications as of 8/13/2018   Medication Dose Route Frequency Provider Last Rate Last Dose   • pneumococcal conj. 13-valent  (PREVNAR-13) vaccine 0.5 mL  0.5 mL Intramuscular Once Joaquín Reich DO           Reviewed use of high risk medication in the elderly: yes  Reviewed for potential of harmful drug interactions in the elderly: yes    Follow Up:  Return in about 2 months (around 10/13/2018), or if symptoms worsen or fail to improve.     An After Visit Summary and PPPS with all of these plans were given to the patient.      prevnar 13 today and pneumovax in 1 year.

## 2018-08-14 LAB
ALBUMIN SERPL-MCNC: 3.8 G/DL (ref 3.2–4.6)
ALBUMIN/GLOB SERPL: 2.2 {RATIO} (ref 1.2–2.2)
ALP SERPL-CCNC: 36 IU/L (ref 39–117)
ALT SERPL-CCNC: 24 IU/L (ref 0–32)
AST SERPL-CCNC: 27 IU/L (ref 0–40)
BILIRUB SERPL-MCNC: 0.6 MG/DL (ref 0–1.2)
BUN SERPL-MCNC: 26 MG/DL (ref 10–36)
BUN/CREAT SERPL: 22 (ref 12–28)
CALCIUM SERPL-MCNC: 8.9 MG/DL (ref 8.7–10.3)
CHLORIDE SERPL-SCNC: 101 MMOL/L (ref 96–106)
CHOLEST SERPL-MCNC: 123 MG/DL (ref 100–199)
CO2 SERPL-SCNC: 24 MMOL/L (ref 20–29)
CREAT SERPL-MCNC: 1.16 MG/DL (ref 0.57–1)
GLOBULIN SER CALC-MCNC: 1.7 G/DL (ref 1.5–4.5)
GLUCOSE SERPL-MCNC: 103 MG/DL (ref 65–99)
HBA1C MFR BLD: 5.3 % (ref 4.8–5.6)
HDLC SERPL-MCNC: 51 MG/DL
LDLC SERPL CALC-MCNC: 55 MG/DL (ref 0–99)
POTASSIUM SERPL-SCNC: 4 MMOL/L (ref 3.5–5.2)
PROT SERPL-MCNC: 5.5 G/DL (ref 6–8.5)
SODIUM SERPL-SCNC: 140 MMOL/L (ref 134–144)
T3FREE SERPL-MCNC: 1.7 PG/ML (ref 2–4.4)
T4 FREE SERPL-MCNC: 1.42 NG/DL (ref 0.82–1.77)
TRIGL SERPL-MCNC: 87 MG/DL (ref 0–149)
TSH SERPL DL<=0.005 MIU/L-ACNC: 16.25 UIU/ML (ref 0.45–4.5)
VLDLC SERPL CALC-MCNC: 17 MG/DL (ref 5–40)

## 2018-08-14 NOTE — PROGRESS NOTES
Call and mail copy of results to patient.  Type 2 diabetes well controlled  Cholesterol well controlled  Kidney function decline stable  Thyroid underactive.  Patient was confused last time.  She should be taking levothyroxine 75mcg po daily with cytomel 5 mcg po daily.  If taking both and not missing, continue levothyroxine 75mcg and take with cytomel 25mcg 1/2 po daily.  Recheck TSH, free T3 and Free T4 in 6 weeks dx hypothyroidism.

## 2018-08-17 ENCOUNTER — TELEPHONE (OUTPATIENT)
Dept: FAMILY MEDICINE CLINIC | Facility: CLINIC | Age: 83
End: 2018-08-17

## 2018-08-20 RX ORDER — LIOTHYRONINE SODIUM 25 UG/1
12.5 TABLET ORAL DAILY
Qty: 15 TABLET | Refills: 5 | Status: SHIPPED | OUTPATIENT
Start: 2018-08-20 | End: 2019-02-21 | Stop reason: SDUPTHER

## 2018-09-04 ENCOUNTER — TELEPHONE (OUTPATIENT)
Dept: FAMILY MEDICINE CLINIC | Facility: CLINIC | Age: 83
End: 2018-09-04

## 2018-09-11 RX ORDER — SODIUM HYPOCHLORITE 2.5 MG/ML
SOLUTION TOPICAL
Qty: 473 ML | Refills: 2 | Status: SHIPPED | OUTPATIENT
Start: 2018-09-11 | End: 2019-01-25

## 2018-09-18 RX ORDER — BUMETANIDE 1 MG/1
TABLET ORAL
Qty: 180 TABLET | Refills: 0 | Status: SHIPPED | OUTPATIENT
Start: 2018-09-18 | End: 2019-04-16 | Stop reason: SDUPTHER

## 2018-09-21 ENCOUNTER — TELEPHONE (OUTPATIENT)
Dept: FAMILY MEDICINE CLINIC | Facility: CLINIC | Age: 83
End: 2018-09-21

## 2018-09-24 NOTE — TELEPHONE ENCOUNTER
INFORMED IT WAS FROM [facility]. SHE SAID SHE NO LONGER NEEDS IT, SO I TOLD HER NOTHING MORE TO DO SINCE IT WAS ALREADY SENT BACK

## 2018-10-18 ENCOUNTER — OFFICE VISIT (OUTPATIENT)
Dept: FAMILY MEDICINE CLINIC | Facility: CLINIC | Age: 83
End: 2018-10-18

## 2018-10-18 VITALS
HEART RATE: 97 BPM | TEMPERATURE: 97.2 F | DIASTOLIC BLOOD PRESSURE: 70 MMHG | OXYGEN SATURATION: 92 % | SYSTOLIC BLOOD PRESSURE: 128 MMHG

## 2018-10-18 DIAGNOSIS — B35.1 ONYCHOMYCOSIS: ICD-10-CM

## 2018-10-18 DIAGNOSIS — I87.2 VENOUS STASIS ULCER OF OTHER PART OF LOWER LEG LIMITED TO BREAKDOWN OF SKIN WITHOUT VARICOSE VEINS, UNSPECIFIED LATERALITY (HCC): Primary | ICD-10-CM

## 2018-10-18 DIAGNOSIS — Z23 IMMUNIZATION DUE: ICD-10-CM

## 2018-10-18 DIAGNOSIS — R60.0 LOWER EXTREMITY EDEMA: ICD-10-CM

## 2018-10-18 DIAGNOSIS — L97.801 VENOUS STASIS ULCER OF OTHER PART OF LOWER LEG LIMITED TO BREAKDOWN OF SKIN WITHOUT VARICOSE VEINS, UNSPECIFIED LATERALITY (HCC): Primary | ICD-10-CM

## 2018-10-18 PROCEDURE — 99213 OFFICE O/P EST LOW 20 MIN: CPT | Performed by: FAMILY MEDICINE

## 2018-10-18 PROCEDURE — 90662 IIV NO PRSV INCREASED AG IM: CPT | Performed by: FAMILY MEDICINE

## 2018-10-18 PROCEDURE — G0008 ADMIN INFLUENZA VIRUS VAC: HCPCS | Performed by: FAMILY MEDICINE

## 2018-10-18 NOTE — PROGRESS NOTES
Subjective   Lisa Grant is a 89 y.o. female. Presents today for   Chief Complaint   Patient presents with   • Follow-up     pt here for follow up visit.    • Immunizations     pt would like flu shot today       History of Present Illness  Patient ehre for wound care f/u and lower extermity swelling.  Is doing better, pain in legs imrpoved.  Ulcerstaions healed.  No cp/soa;  No pnd/orthopnea;  Needs flu injection.    Review of Systems   Constitutional: Negative for chills and fever.   Respiratory: Negative for shortness of breath.    Cardiovascular: Positive for leg swelling. Negative for chest pain and palpitations.       Patient Active Problem List   Diagnosis   • Paroxysmal atrial fibrillation (CMS/HCC)   • Angina pectoris (CMS/HCC)   • Anxiety   • Atherosclerosis of aorta (CMS/HCC)   • Coronary arteriosclerosis in native artery   • Benign essential hypertension   • Chronic kidney disease   • Claudication (CMS/HCC)   • Congestive heart failure (CMS/HCC)   • Constipation   • Type 2 diabetes mellitus with diabetic nephropathy (CMS/HCC)   • Dry skin dermatitis   • Dyslipidemia   • Gastroesophageal reflux disease with esophagitis   • Glaucoma   • Hypothyroidism   • Knee pain   • Onychomycosis   • Primary insomnia   • Tinea pedis   • Vitamin D deficiency       Social History     Social History   • Marital status:      Social History Main Topics   • Smoking status: Never Smoker   • Smokeless tobacco: Never Used   • Alcohol use No   • Drug use: No     Other Topics Concern   • Not on file       Allergies   Allergen Reactions   • Codeine    • Hydrocodone-Acetaminophen    • Metoclopramide    • Oxycodone    • Propoxyphene        Current Outpatient Prescriptions on File Prior to Visit   Medication Sig Dispense Refill   • alendronate (FOSAMAX) 70 MG tablet TAKE ONE TABLET BY MOUTH ONCE A WEEK 12 tablet 1   • amiodarone (PACERONE) 200 MG tablet Take 1 tablet by mouth Daily.     • aspirin 81 MG chewable tablet Chew 81  mg Daily.     • bumetanide (BUMEX) 1 MG tablet TAKE 1 TABLET BY MOUTH ONCE DAILY 180 tablet 0   • CALCIUM PO Take  by mouth.     • clopidogrel (PLAVIX) 75 MG tablet Take 75 mg by mouth Daily.     • gabapentin (NEURONTIN) 100 MG capsule 1 po nightly x 14 days, then 1 po twice daily for back pain 60 capsule 2   • levothyroxine (SYNTHROID, LEVOTHROID) 75 MCG tablet Take 1 tablet by mouth Daily. 30 tablet 6   • liothyronine (CYTOMEL) 25 MCG tablet Take 0.5 tablets by mouth Daily. 15 tablet 5   • metoprolol succinate XL (TOPROL-XL) 100 MG 24 hr tablet Take 0.5 tablets by mouth Daily. 30 tablet 5   • nitroglycerin (NITROLINGUAL) 0.4 MG/SPRAY spray Nitroglycerin 0.4 MG/SPRAY Translingual Solution; Patient Sig: Nitroglycerin 0.4 MG/SPRAY Translingual Solution USE AS DIRECTED.; 0; 11-Sep-2013; Active     • pantoprazole (PROTONIX) 40 MG EC tablet Take 40 mg by mouth Daily.     • Polyethylene Glycol 3350 granules Take  by mouth daily.     • pravastatin (PRAVACHOL) 20 MG tablet TAKE ONE TABLET BY MOUTH ONCE DAILY 30 tablet 5   • terbinafine (lamiSIL) 250 MG tablet Take 1 tablet by mouth Daily. 28 tablet 0   • traZODone (DESYREL) 50 MG tablet Take 1 tablet by mouth Every Night. 30 tablet 2   • ammonium lactate (AMLACTIN) 12 % cream APPLY CREAM TOPICALLY DAILY TO FEET AND TOES (AVOID WOUNDS). APPLY WITH DRESSING CHANGES 385 g 1   • ketoconazole (NIZORAL) 2 % cream Apply  topically Daily. Apply to feet and toes with dressing changes 80 g 2   • sodium hypochlorite (DAKIN'S) 0.2-0.25 % topical solution Use daily as per wound care instructions 473 mL 2   • urea (CARMOL) 40 % cream Apply  topically Daily. To lower extremities and feet 198 g 5   • urea (CARMOL) 40 % cream Apply  topically Daily. Apply to feet and toes (avoid wounds);  Apply with dressing changes 198 g 1     No current facility-administered medications on file prior to visit.        Objective   Vitals:    10/18/18 1603   BP: 128/70   BP Location: Left arm   Patient  Position: Sitting   Cuff Size: Adult   Pulse: 97   Temp: 97.2 °F (36.2 °C)   TempSrc: Oral   SpO2: 92%       Physical Exam   Constitutional: She appears well-developed and well-nourished.   HENT:   Head: Normocephalic and atraumatic.   Neck: Neck supple. No JVD present. No thyromegaly present.   Cardiovascular: Normal rate, regular rhythm and normal heart sounds.  Exam reveals no gallop and no friction rub.    No murmur heard.  Pulmonary/Chest: Effort normal and breath sounds normal. No respiratory distress. She has no wheezes. She has no rales.   Abdominal: Soft. Bowel sounds are normal. She exhibits no distension. There is no tenderness. There is no rebound and no guarding.   Musculoskeletal: She exhibits edema.   Neurological: She is alert.   Skin: Skin is warm and dry.   Skin dryness and cracking, but greatly improved.  Thickened nails.  Applied compression wraps for patient as still pedal edema, though greatly improved.  Pedal pulses intact;  Cap ref < 3sec   Psychiatric: She has a normal mood and affect. Her behavior is normal.   Nursing note and vitals reviewed.      Assessment/Plan   Lisa was seen today for follow-up and immunizations.    Diagnoses and all orders for this visit:    Venous stasis ulcer of other part of lower leg limited to breakdown of skin without varicose veins, unspecified laterality (CMS/HCC)  Comments:  Resolved    Immunization due  -     Flu Vaccine High Dose PF 65YR+ ([id number])    Lower extremity edema    Onychomycosis  -     Ambulatory Referral to Podiatry        Still dry skin, but greatly improved.  Venous ulcers resolved now;  Edema trace now, best have seen.  Needs nails cut, refer to podiatry wants close to [facility] as hard time traveling.       -Follow up: 3 months and prn

## 2018-12-03 RX ORDER — ALENDRONATE SODIUM 70 MG/1
TABLET ORAL
Qty: 12 TABLET | Refills: 1 | Status: SHIPPED | OUTPATIENT
Start: 2018-12-03 | End: 2019-05-22 | Stop reason: SDUPTHER

## 2018-12-17 RX ORDER — LEVOTHYROXINE SODIUM 0.07 MG/1
TABLET ORAL
Qty: 90 TABLET | Refills: 2 | Status: SHIPPED | OUTPATIENT
Start: 2018-12-17 | End: 2019-01-21 | Stop reason: DRUGHIGH

## 2019-01-02 RX ORDER — PRAVASTATIN SODIUM 20 MG
TABLET ORAL
Qty: 30 TABLET | Refills: 5 | Status: SHIPPED | OUTPATIENT
Start: 2019-01-02 | End: 2019-06-27 | Stop reason: SDUPTHER

## 2019-01-18 ENCOUNTER — OFFICE VISIT (OUTPATIENT)
Dept: FAMILY MEDICINE CLINIC | Facility: CLINIC | Age: 84
End: 2019-01-18

## 2019-01-18 VITALS
BODY MASS INDEX: 22.67 KG/M2 | DIASTOLIC BLOOD PRESSURE: 60 MMHG | SYSTOLIC BLOOD PRESSURE: 122 MMHG | TEMPERATURE: 97.7 F | WEIGHT: 120 LBS | OXYGEN SATURATION: 96 % | HEART RATE: 44 BPM

## 2019-01-18 DIAGNOSIS — E78.5 DYSLIPIDEMIA: ICD-10-CM

## 2019-01-18 DIAGNOSIS — I10 BENIGN ESSENTIAL HYPERTENSION: ICD-10-CM

## 2019-01-18 DIAGNOSIS — E03.8 OTHER SPECIFIED HYPOTHYROIDISM: ICD-10-CM

## 2019-01-18 DIAGNOSIS — E55.9 VITAMIN D DEFICIENCY: ICD-10-CM

## 2019-01-18 DIAGNOSIS — I25.10 CORONARY ARTERIOSCLEROSIS IN NATIVE ARTERY: Primary | ICD-10-CM

## 2019-01-18 DIAGNOSIS — E11.21 TYPE 2 DIABETES MELLITUS WITH DIABETIC NEPHROPATHY, WITHOUT LONG-TERM CURRENT USE OF INSULIN (HCC): ICD-10-CM

## 2019-01-18 DIAGNOSIS — I48.0 PAROXYSMAL ATRIAL FIBRILLATION (HCC): ICD-10-CM

## 2019-01-18 PROCEDURE — 99214 OFFICE O/P EST MOD 30 MIN: CPT | Performed by: FAMILY MEDICINE

## 2019-01-18 NOTE — PROGRESS NOTES
Subjective   Lisa Grant is a 89 y.o. female. Presents today for   Chief Complaint   Patient presents with   • Follow-up     legs ache all the time   • Diabetes   • Coronary Artery Disease   • Hypertension   • Hyperlipidemia   • Hypothyroidism       Diabetes   She presents for her follow-up diabetic visit. She has type 2 diabetes mellitus. Her disease course has been stable. Pertinent negatives for hypoglycemia include no dizziness. Associated symptoms include foot paresthesias. Pertinent negatives for diabetes include no chest pain and no foot ulcerations. Symptoms are stable. Risk factors for coronary artery disease include diabetes mellitus, dyslipidemia, hypertension, obesity and post-menopausal. Current diabetic treatment includes diet. She is compliant with treatment all of the time. She is following a diabetic diet. An ACE inhibitor/angiotensin II receptor blocker is not being taken.   Hypertension   This is a chronic problem. The problem has been waxing and waning since onset. The problem is controlled. Associated symptoms include peripheral edema (doing better). Pertinent negatives include no chest pain, orthopnea, palpitations, PND or shortness of breath. There are no associated agents to hypertension. Risk factors for coronary artery disease include dyslipidemia, diabetes mellitus and post-menopausal state. Past treatments include beta blockers. Current antihypertension treatment includes beta blockers. The current treatment provides moderate improvement. There are no compliance problems.  Hypertensive end-organ damage includes kidney disease and CAD/MI. Identifiable causes of hypertension include chronic renal disease.   Coronary Artery Disease   Presents for follow-up visit. Symptoms include leg swelling. Pertinent negatives include no chest pain, chest pressure, chest tightness, dizziness, palpitations, shortness of breath or weight gain (Has lost weight, reports appetite fine and no symptoms;  Did  diurese significantly as severe edema with non-healing ulcers.). Risk factors include hyperlipidemia. The symptoms have been stable. Compliance with diet is good. Compliance with medications is good.   Hyperlipidemia   This is a chronic problem. The current episode started more than 1 year ago. The problem is controlled. Recent lipid tests were reviewed and are normal. Exacerbating diseases include chronic renal disease, diabetes and hypothyroidism. Factors aggravating her hyperlipidemia include beta blockers. Pertinent negatives include no chest pain or shortness of breath. Current antihyperlipidemic treatment includes statins. The current treatment provides moderate improvement of lipids. There are no compliance problems.  Risk factors for coronary artery disease include diabetes mellitus, dyslipidemia, hypertension and post-menopausal.   Hypothyroidism   This is a chronic problem. The current episode started more than 1 month ago. The problem occurs constantly. The problem has been unchanged. Pertinent negatives include no abdominal pain, chest pain, chills, fever, nausea or vomiting. Exacerbated by: on amiodarone. Treatments tried: on levothyroxine and cytomel. The treatment provided moderate relief.     Hx of low vitamin D low    Review of Systems   Constitutional: Negative for appetite change, chills, fever and weight gain (Has lost weight, reports appetite fine and no symptoms;  Did diurese significantly as severe edema with non-healing ulcers.).   Respiratory: Negative for chest tightness and shortness of breath.    Cardiovascular: Positive for leg swelling. Negative for chest pain, palpitations, orthopnea and PND.   Gastrointestinal: Negative for abdominal pain, blood in stool, diarrhea, nausea and vomiting.   Neurological: Negative for dizziness and syncope.       Patient Active Problem List   Diagnosis   • Paroxysmal atrial fibrillation (CMS/HCC)   • Angina pectoris (CMS/HCC)   • Anxiety   •  Atherosclerosis of aorta (CMS/[facility])   • Coronary arteriosclerosis in native artery   • Benign essential hypertension   • Chronic kidney disease   • Claudication (CMS/[facility])   • Congestive heart failure (CMS/[facility])   • Constipation   • Type 2 diabetes mellitus with diabetic nephropathy (CMS/[facility])   • Dry skin dermatitis   • Dyslipidemia   • Gastroesophageal reflux disease with esophagitis   • Glaucoma   • Hypothyroidism   • Knee pain   • Onychomycosis   • Primary insomnia   • Tinea pedis   • Vitamin D deficiency       Social History     Socioeconomic History   • Marital status:      Spouse name: Not on file   • Number of children: Not on file   • Years of education: Not on file   • Highest education level: Not on file   Tobacco Use   • Smoking status: Never Smoker   • Smokeless tobacco: Never Used   Substance and Sexual Activity   • Alcohol use: No   • Drug use: No       Allergies   Allergen Reactions   • Codeine    • Hydrocodone-Acetaminophen    • Metoclopramide    • Oxycodone    • Propoxyphene        Current Outpatient Medications on File Prior to Visit   Medication Sig Dispense Refill   • alendronate (FOSAMAX) 70 MG tablet TAKE 1 TABLET BY MOUTH ONCE A WEEK 12 tablet 1   • amiodarone (PACERONE) 200 MG tablet Take 1 tablet by mouth Daily.     • ammonium lactate (AMLACTIN) 12 % cream APPLY CREAM TOPICALLY DAILY TO FEET AND TOES (AVOID WOUNDS). APPLY WITH DRESSING CHANGES 385 g 1   • aspirin 81 MG chewable tablet Chew 81 mg Daily.     • bumetanide (BUMEX) 1 MG tablet TAKE 1 TABLET BY MOUTH ONCE DAILY 180 tablet 0   • CALCIUM PO Take  by mouth.     • clopidogrel (PLAVIX) 75 MG tablet Take 75 mg by mouth Daily.     • gabapentin (NEURONTIN) 100 MG capsule 1 po nightly x 14 days, then 1 po twice daily for back pain 60 capsule 2   • ketoconazole (NIZORAL) 2 % cream Apply  topically Daily. Apply to feet and toes with dressing changes 80 g 2   • levothyroxine (SYNTHROID, LEVOTHROID) 75 MCG tablet TAKE 1 TABLET BY MOUTH  ONCE DAILY 90 tablet 2   • liothyronine (CYTOMEL) 25 MCG tablet Take 0.5 tablets by mouth Daily. 15 tablet 5   • metoprolol succinate XL (TOPROL-XL) 100 MG 24 hr tablet Take 0.5 tablets by mouth Daily. 30 tablet 5   • nitroglycerin (NITROLINGUAL) 0.4 MG/SPRAY spray Nitroglycerin 0.4 MG/SPRAY Translingual Solution; Patient Sig: Nitroglycerin 0.4 MG/SPRAY Translingual Solution USE AS DIRECTED.; 0; 11-Sep-2013; Active     • pantoprazole (PROTONIX) 40 MG EC tablet Take 40 mg by mouth Daily.     • Polyethylene Glycol 3350 granules Take  by mouth daily.     • pravastatin (PRAVACHOL) 20 MG tablet TAKE 1 TABLET BY MOUTH ONCE DAILY 30 tablet 5   • sodium hypochlorite (DAKIN'S) 0.2-0.25 % topical solution Use daily as per wound care instructions 473 mL 2   • terbinafine (lamiSIL) 250 MG tablet Take 1 tablet by mouth Daily. 28 tablet 0   • traZODone (DESYREL) 50 MG tablet Take 1 tablet by mouth Every Night. 30 tablet 2   • urea (CARMOL) 40 % cream Apply  topically Daily. To lower extremities and feet 198 g 5   • urea (CARMOL) 40 % cream Apply  topically Daily. Apply to feet and toes (avoid wounds);  Apply with dressing changes 198 g 1     No current facility-administered medications on file prior to visit.        Objective   Vitals:    01/18/19 1538   BP: 122/60   Pulse: (!) 44   Temp: 97.7 °F (36.5 °C)   SpO2: 96%   Weight: 54.4 kg (120 lb)  Comment: per pt . carlie       Physical Exam   Constitutional: She appears well-developed and well-nourished.   HENT:   Head: Normocephalic and atraumatic.   Neck: Neck supple. No JVD present. No thyromegaly present.   Cardiovascular: Normal rate, regular rhythm and normal heart sounds. Exam reveals no gallop and no friction rub.   No murmur heard.  Pulmonary/Chest: Effort normal and breath sounds normal. No respiratory distress. She has no wheezes. She has no rales.   Abdominal: Soft. Bowel sounds are normal. She exhibits no distension. There is no tenderness. There is no rebound and no  guarding.   Musculoskeletal: She exhibits edema (trace edema, but significant improvement, shoes on today.).   Neurological: She is alert.   Skin: Skin is warm and dry.   Psychiatric: She has a normal mood and affect. Her behavior is normal.   Nursing note and vitals reviewed.      Assessment/Plan   Lisa was seen today for follow-up, diabetes, coronary artery disease, hypertension, hyperlipidemia and hypothyroidism.    Diagnoses and all orders for this visit:    Coronary arteriosclerosis in native artery  -     Comprehensive Metabolic Panel  -     Hemoglobin A1c  -     Lipid Panel    Benign essential hypertension  -     Comprehensive Metabolic Panel  -     Hemoglobin A1c  -     Lipid Panel    Type 2 diabetes mellitus with diabetic nephropathy, without long-term current use of insulin (CMS/[facility])  -     Comprehensive Metabolic Panel  -     Hemoglobin A1c  -     Lipid Panel    Other specified hypothyroidism  -     TSH    Dyslipidemia  -     Comprehensive Metabolic Panel  -     Lipid Panel    Vitamin D deficiency  -     Vitamin D 25 Hydroxy    Paroxysmal atrial fibrillation (CMS/[facility])    -declines anything for leg pain, can live with pain as doesn't want more medications  -leg swelling best I have seen, have not seen in shoes in almost 2 years.  Reports weigh tloss, ? 2nary to diuresis;  Will check basic labs, offered work-up for weight loss including imaging, consideration of referral for endoscopy though denies most symptoms, but declines given age.  -cad RF reduction, Lipid, BP and BS control.  -hypertension - controlled, continue medications  -HLD - continue statin, recheck lipids.  -dm2 - check labs as due  -hypothyroidism - continue medication, recheck thyroid labs.  -reports wounds all healed         -Follow up: 4 months and prn

## 2019-01-19 LAB
25(OH)D3+25(OH)D2 SERPL-MCNC: 34.2 NG/ML (ref 30–100)
ALBUMIN SERPL-MCNC: 4 G/DL (ref 3.2–4.6)
ALBUMIN/GLOB SERPL: 2.2 {RATIO} (ref 1.2–2.2)
ALP SERPL-CCNC: 34 IU/L (ref 39–117)
ALT SERPL-CCNC: 13 IU/L (ref 0–32)
AST SERPL-CCNC: 17 IU/L (ref 0–40)
BILIRUB SERPL-MCNC: 0.5 MG/DL (ref 0–1.2)
BUN SERPL-MCNC: 41 MG/DL (ref 10–36)
BUN/CREAT SERPL: 26 (ref 12–28)
CALCIUM SERPL-MCNC: 9 MG/DL (ref 8.7–10.3)
CHLORIDE SERPL-SCNC: 101 MMOL/L (ref 96–106)
CHOLEST SERPL-MCNC: 127 MG/DL (ref 100–199)
CO2 SERPL-SCNC: 24 MMOL/L (ref 20–29)
CREAT SERPL-MCNC: 1.59 MG/DL (ref 0.57–1)
GLOBULIN SER CALC-MCNC: 1.8 G/DL (ref 1.5–4.5)
GLUCOSE SERPL-MCNC: 122 MG/DL (ref 65–99)
HBA1C MFR BLD: 5.4 % (ref 4.8–5.6)
HDLC SERPL-MCNC: 55 MG/DL
LDLC SERPL CALC-MCNC: 50 MG/DL (ref 0–99)
POTASSIUM SERPL-SCNC: 4.7 MMOL/L (ref 3.5–5.2)
PROT SERPL-MCNC: 5.8 G/DL (ref 6–8.5)
SODIUM SERPL-SCNC: 139 MMOL/L (ref 134–144)
TRIGL SERPL-MCNC: 108 MG/DL (ref 0–149)
TSH SERPL DL<=0.005 MIU/L-ACNC: 5.18 UIU/ML (ref 0.45–4.5)
VLDLC SERPL CALC-MCNC: 22 MG/DL (ref 5–40)

## 2019-01-21 DIAGNOSIS — E03.8 OTHER SPECIFIED HYPOTHYROIDISM: Primary | ICD-10-CM

## 2019-01-21 RX ORDER — LEVOTHYROXINE SODIUM 88 UG/1
88 TABLET ORAL DAILY
Qty: 30 TABLET | Refills: 1 | Status: SHIPPED | OUTPATIENT
Start: 2019-01-21 | End: 2019-02-21 | Stop reason: SDUPTHER

## 2019-01-21 NOTE — PROGRESS NOTES
Call and mail copy of results to patient.  Thyroid not goal, change levothyroxine 88mcg and continue cytomel same dose.  Recheck TSH, free T3 and Free T4 in 6 weeks dx hypothyrodism.  Cholesterol well controlled  Kidney function decline, avoid nsaids.

## 2019-01-25 RX ORDER — LOSARTAN POTASSIUM 100 MG/1
100 TABLET ORAL DAILY
Qty: 30 TABLET | Refills: 5
Start: 2019-01-25

## 2019-01-25 RX ORDER — METOPROLOL TARTRATE 50 MG/1
50 TABLET, FILM COATED ORAL 3 TIMES DAILY
Qty: 270 TABLET | Refills: 0
Start: 2019-01-25

## 2019-02-19 LAB
T3FREE SERPL-MCNC: 1.3 PG/ML (ref 2–4.4)
T4 FREE SERPL-MCNC: 1.83 NG/DL (ref 0.82–1.77)
TSH SERPL DL<=0.005 MIU/L-ACNC: 6.16 UIU/ML (ref 0.45–4.5)

## 2019-02-21 ENCOUNTER — TELEPHONE (OUTPATIENT)
Dept: FAMILY MEDICINE CLINIC | Facility: CLINIC | Age: 84
End: 2019-02-21

## 2019-02-21 RX ORDER — LIOTHYRONINE SODIUM 25 UG/1
12.5 TABLET ORAL DAILY
Qty: 15 TABLET | Refills: 1 | Status: SHIPPED | OUTPATIENT
Start: 2019-02-21 | End: 2019-05-22 | Stop reason: SDUPTHER

## 2019-02-21 RX ORDER — LEVOTHYROXINE SODIUM 88 UG/1
88 TABLET ORAL DAILY
Qty: 30 TABLET | Refills: 1 | Status: SHIPPED | OUTPATIENT
Start: 2019-02-21 | End: 2019-05-22 | Stop reason: SDUPTHER

## 2019-04-16 RX ORDER — BUMETANIDE 1 MG/1
1 TABLET ORAL DAILY
Qty: 30 TABLET | Refills: 0 | Status: SHIPPED | OUTPATIENT
Start: 2019-04-16 | End: 2019-05-22 | Stop reason: SDUPTHER

## 2019-04-29 ENCOUNTER — OFFICE VISIT (OUTPATIENT)
Dept: FAMILY MEDICINE CLINIC | Facility: CLINIC | Age: 84
End: 2019-04-29

## 2019-04-29 VITALS — OXYGEN SATURATION: 92 % | DIASTOLIC BLOOD PRESSURE: 60 MMHG | HEART RATE: 99 BPM | SYSTOLIC BLOOD PRESSURE: 104 MMHG

## 2019-04-29 DIAGNOSIS — E03.8 OTHER SPECIFIED HYPOTHYROIDISM: ICD-10-CM

## 2019-04-29 DIAGNOSIS — I48.0 PAROXYSMAL ATRIAL FIBRILLATION (HCC): ICD-10-CM

## 2019-04-29 DIAGNOSIS — N18.30 STAGE 3 CHRONIC KIDNEY DISEASE (HCC): ICD-10-CM

## 2019-04-29 DIAGNOSIS — I10 BENIGN ESSENTIAL HYPERTENSION: Primary | ICD-10-CM

## 2019-04-29 PROCEDURE — 99214 OFFICE O/P EST MOD 30 MIN: CPT | Performed by: FAMILY MEDICINE

## 2019-04-29 RX ORDER — SPIRONOLACTONE 25 MG/1
12.5 TABLET ORAL DAILY
COMMUNITY

## 2019-04-29 RX ORDER — CHOLECALCIFEROL (VITAMIN D3) 125 MCG
5 CAPSULE ORAL NIGHTLY
COMMUNITY

## 2019-04-29 NOTE — PROGRESS NOTES
Subjective   Lisa Grant is a 89 y.o. female. Presents today for   Chief Complaint   Patient presents with   • Hyperlipidemia   • Hypertension   • Hypothyroidism   • Diabetes   • Atrial Fibrillation       Atrial Fibrillation   Presents for follow-up visit. Symptoms are negative for chest pain, dizziness, hypertension, hypotension, palpitations, shortness of breath and syncope. (Saw cardiology in f/u and had lexiscan stress; EF calc at 34%;  Had hypokinesis and moderately dilated LV;  LBBB noted on EKG portion.   Findings c/w non-ischemic cardiomyopathy    On amiodarone, off anticoagulation as high bleed risk.) The symptoms have been stable. Past medical history includes atrial fibrillation.   Hypertension   This is a chronic problem. The current episode started more than 1 year ago. The problem is unchanged. The problem is controlled. Pertinent negatives include no chest pain, orthopnea, palpitations, peripheral edema, PND or shortness of breath. There are no associated agents to hypertension. Risk factors for coronary artery disease include dyslipidemia and post-menopausal state. Past treatments include angiotensin blockers, beta blockers and diuretics. Current antihypertension treatment includes diuretics, angiotensin blockers and beta blockers. The current treatment provides moderate improvement. Compliance problems include exercise.  Hypertensive end-organ damage includes kidney disease and heart failure (currently stable). Identifiable causes of hypertension include chronic renal disease (Stage III).   Hypothyroidism   This is a chronic problem. The problem occurs constantly. Pertinent negatives include no chest pain. Exacerbated by: on amiodarone therapy. Treatments tried: on levothyroxine nd to be on cytomel;  Has been confused on meds;  Have explained several times need to be on both.       Hx of diabetes type 2 per chart history, in January had a1c 5.4% on no medications.  Resolved diagnosis.    Review of  Systems   Respiratory: Negative for shortness of breath.    Cardiovascular: Negative for chest pain, palpitations, orthopnea, syncope and PND.   Neurological: Negative for dizziness.       Patient Active Problem List   Diagnosis   • Paroxysmal atrial fibrillation (CMS/HCC)   • Angina pectoris (CMS/HCC)   • Anxiety   • Atherosclerosis of aorta (CMS/HCC)   • Coronary arteriosclerosis in native artery   • Benign essential hypertension   • Chronic kidney disease   • Claudication (CMS/HCC)   • Congestive heart failure (CMS/HCC)   • Constipation   • Dry skin dermatitis   • Dyslipidemia   • Gastroesophageal reflux disease with esophagitis   • Glaucoma   • Hypothyroidism   • Knee pain   • Onychomycosis   • Primary insomnia   • Tinea pedis   • Vitamin D deficiency       Social History     Socioeconomic History   • Marital status:      Spouse name: Not on file   • Number of children: Not on file   • Years of education: Not on file   • Highest education level: Not on file   Tobacco Use   • Smoking status: Never Smoker   • Smokeless tobacco: Never Used   Substance and Sexual Activity   • Alcohol use: No   • Drug use: No       Allergies   Allergen Reactions   • Codeine    • Hydrocodone-Acetaminophen    • Metoclopramide    • Oxycodone    • Propoxyphene        Current Outpatient Medications on File Prior to Visit   Medication Sig Dispense Refill   • alendronate (FOSAMAX) 70 MG tablet TAKE 1 TABLET BY MOUTH ONCE A WEEK 12 tablet 1   • amiodarone (PACERONE) 200 MG tablet Take 1 tablet by mouth Daily.     • aspirin 81 MG chewable tablet Chew 81 mg Daily.     • bumetanide (BUMEX) 1 MG tablet Take 1 tablet by mouth Daily. PLEASE CALL THE OFFICE FOR AN APPT 30 tablet 0   • CALCIUM PO Take  by mouth.     • clopidogrel (PLAVIX) 75 MG tablet Take 75 mg by mouth Daily.     • levothyroxine (SYNTHROID) 88 MCG tablet Take 1 tablet by mouth Daily. 30 tablet 1   • liothyronine (CYTOMEL) 25 MCG tablet Take 0.5 tablets by mouth Daily. 15  tablet 1   • losartan (COZAAR) 100 MG tablet Take 1 tablet by mouth Daily. 30 tablet 5   • melatonin 5 MG tablet tablet Take 5 mg by mouth Every Night.     • metoprolol tartrate (LOPRESSOR) 50 MG tablet Take 1 tablet by mouth 3 (Three) Times a Day. 270 tablet 0   • pantoprazole (PROTONIX) 40 MG EC tablet Take 40 mg by mouth Daily.     • pravastatin (PRAVACHOL) 20 MG tablet TAKE 1 TABLET BY MOUTH ONCE DAILY 30 tablet 5   • spironolactone (ALDACTONE) 25 MG tablet Take 12.5 mg by mouth Daily.     • nitroglycerin (NITROLINGUAL) 0.4 MG/SPRAY spray Nitroglycerin 0.4 MG/SPRAY Translingual Solution; Patient Sig: Nitroglycerin 0.4 MG/SPRAY Translingual Solution USE AS DIRECTED.; 0; 11-Sep-2013; Active       No current facility-administered medications on file prior to visit.        Objective   Vitals:    04/29/19 1511   BP: 104/60   BP Location: Left arm   Patient Position: Sitting   Cuff Size: Adult   Pulse: 99   SpO2: 92%   Weight: Comment: pt refused to be weighed   Height: Comment: pt refused to get on scale       Physical Exam   Constitutional: She appears well-developed and well-nourished.   HENT:   Head: Normocephalic and atraumatic.   Neck: Neck supple. No JVD present. No thyromegaly present.   Cardiovascular: Normal rate, regular rhythm and normal heart sounds. Exam reveals no gallop and no friction rub.   No murmur heard.  Pulmonary/Chest: Effort normal and breath sounds normal. No respiratory distress. She has no wheezes. She has no rales.   Abdominal: Soft. Bowel sounds are normal. She exhibits no distension. There is no tenderness. There is no rebound and no guarding.   Musculoskeletal: She exhibits no edema.   Neurological: She is alert.   Skin: Skin is warm and dry.   Psychiatric: She has a normal mood and affect. Her behavior is normal.   Nursing note and vitals reviewed.      Assessment/Plan   Lisa was seen today for hyperlipidemia, hypertension, hypothyroidism, diabetes and atrial fibrillation.    Diagnoses  and all orders for this visit:    Benign essential hypertension  -     Comprehensive Metabolic Panel  -     CBC & Differential  -     T3, Free  -     T4, Free  -     TSH  -     Lipid Panel    Paroxysmal atrial fibrillation (CMS/HCC)  -     Comprehensive Metabolic Panel  -     CBC & Differential  -     T3, Free  -     T4, Free  -     TSH  -     Lipid Panel    Other specified hypothyroidism  -     Comprehensive Metabolic Panel  -     CBC & Differential  -     T3, Free  -     T4, Free  -     TSH  -     Lipid Panel    Stage 3 chronic kidney disease (CMS/HCC)  -     Comprehensive Metabolic Panel  -     CBC & Differential  -     T3, Free  -     T4, Free  -     TSH  -     Lipid Panel        -patients edema down greatly still and wearing shoes  -hypertension - controlled, continue medications  -hypothyroidism - continue medication, recheck thyroid labs.  -HLD - continue statin, recheck lipids.  -a. Fib - on amiodarone;  Has non-ischemic cardiology myopathy;  F/u w/ cardiology as doing       -Follow up: 6 months and prn

## 2019-04-30 LAB
ALBUMIN SERPL-MCNC: 3.9 G/DL (ref 3.2–4.6)
ALBUMIN/GLOB SERPL: 2 {RATIO} (ref 1.2–2.2)
ALP SERPL-CCNC: 29 IU/L (ref 39–117)
ALT SERPL-CCNC: 19 IU/L (ref 0–32)
AST SERPL-CCNC: 27 IU/L (ref 0–40)
BASOPHILS # BLD AUTO: 0 X10E3/UL (ref 0–0.2)
BASOPHILS NFR BLD AUTO: 0 %
BILIRUB SERPL-MCNC: 0.8 MG/DL (ref 0–1.2)
BUN SERPL-MCNC: 49 MG/DL (ref 10–36)
BUN/CREAT SERPL: 26 (ref 12–28)
CALCIUM SERPL-MCNC: 9.1 MG/DL (ref 8.7–10.3)
CHLORIDE SERPL-SCNC: 99 MMOL/L (ref 96–106)
CHOLEST SERPL-MCNC: 108 MG/DL (ref 100–199)
CO2 SERPL-SCNC: 24 MMOL/L (ref 20–29)
CREAT SERPL-MCNC: 1.89 MG/DL (ref 0.57–1)
EOSINOPHIL # BLD AUTO: 0.1 X10E3/UL (ref 0–0.4)
EOSINOPHIL NFR BLD AUTO: 2 %
ERYTHROCYTE [DISTWIDTH] IN BLOOD BY AUTOMATED COUNT: 14.3 % (ref 12.3–15.4)
GLOBULIN SER CALC-MCNC: 2 G/DL (ref 1.5–4.5)
GLUCOSE SERPL-MCNC: 113 MG/DL (ref 65–99)
HCT VFR BLD AUTO: 41.8 % (ref 34–46.6)
HDLC SERPL-MCNC: 47 MG/DL
HGB BLD-MCNC: 13.8 G/DL (ref 11.1–15.9)
IMM GRANULOCYTES # BLD AUTO: 0 X10E3/UL (ref 0–0.1)
IMM GRANULOCYTES NFR BLD AUTO: 0 %
LDLC SERPL CALC-MCNC: 42 MG/DL (ref 0–99)
LYMPHOCYTES # BLD AUTO: 1.1 X10E3/UL (ref 0.7–3.1)
LYMPHOCYTES NFR BLD AUTO: 22 %
MCH RBC QN AUTO: 32 PG (ref 26.6–33)
MCHC RBC AUTO-ENTMCNC: 33 G/DL (ref 31.5–35.7)
MCV RBC AUTO: 97 FL (ref 79–97)
MONOCYTES # BLD AUTO: 0.4 X10E3/UL (ref 0.1–0.9)
MONOCYTES NFR BLD AUTO: 8 %
NEUTROPHILS # BLD AUTO: 3.2 X10E3/UL (ref 1.4–7)
NEUTROPHILS NFR BLD AUTO: 68 %
PLATELET # BLD AUTO: 179 X10E3/UL (ref 150–379)
POTASSIUM SERPL-SCNC: 4.9 MMOL/L (ref 3.5–5.2)
PROT SERPL-MCNC: 5.9 G/DL (ref 6–8.5)
RBC # BLD AUTO: 4.31 X10E6/UL (ref 3.77–5.28)
SODIUM SERPL-SCNC: 140 MMOL/L (ref 134–144)
T3FREE SERPL-MCNC: 2.1 PG/ML (ref 2–4.4)
T4 FREE SERPL-MCNC: 1.79 NG/DL (ref 0.82–1.77)
TRIGL SERPL-MCNC: 93 MG/DL (ref 0–149)
TSH SERPL DL<=0.005 MIU/L-ACNC: 2.68 UIU/ML (ref 0.45–4.5)
VLDLC SERPL CALC-MCNC: 19 MG/DL (ref 5–40)
WBC # BLD AUTO: 4.8 X10E3/UL (ref 3.4–10.8)

## 2019-05-22 RX ORDER — ALENDRONATE SODIUM 70 MG/1
TABLET ORAL
Qty: 12 TABLET | Refills: 1 | Status: SHIPPED | OUTPATIENT
Start: 2019-05-22 | End: 2019-11-13 | Stop reason: SDUPTHER

## 2019-05-22 RX ORDER — LIOTHYRONINE SODIUM 25 UG/1
TABLET ORAL
Qty: 45 TABLET | Refills: 5 | Status: SHIPPED | OUTPATIENT
Start: 2019-05-22 | End: 2020-06-02

## 2019-05-22 RX ORDER — BUMETANIDE 1 MG/1
TABLET ORAL
Qty: 90 TABLET | Refills: 1 | Status: SHIPPED | OUTPATIENT
Start: 2019-05-22 | End: 2019-12-13 | Stop reason: SDUPTHER

## 2019-05-22 RX ORDER — LEVOTHYROXINE SODIUM 88 UG/1
TABLET ORAL
Qty: 90 TABLET | Refills: 1 | Status: SHIPPED | OUTPATIENT
Start: 2019-05-22 | End: 2019-11-23 | Stop reason: SDUPTHER

## 2019-06-27 RX ORDER — PRAVASTATIN SODIUM 20 MG
TABLET ORAL
Qty: 30 TABLET | Refills: 5 | Status: SHIPPED | OUTPATIENT
Start: 2019-06-27 | End: 2020-01-03

## 2019-10-28 ENCOUNTER — OFFICE VISIT (OUTPATIENT)
Dept: FAMILY MEDICINE CLINIC | Facility: CLINIC | Age: 84
End: 2019-10-28

## 2019-10-28 VITALS
TEMPERATURE: 97.5 F | SYSTOLIC BLOOD PRESSURE: 120 MMHG | OXYGEN SATURATION: 97 % | DIASTOLIC BLOOD PRESSURE: 50 MMHG | WEIGHT: 119 LBS | HEART RATE: 55 BPM | BODY MASS INDEX: 22.48 KG/M2

## 2019-10-28 DIAGNOSIS — E03.8 OTHER SPECIFIED HYPOTHYROIDISM: ICD-10-CM

## 2019-10-28 DIAGNOSIS — I25.10 CORONARY ARTERIOSCLEROSIS IN NATIVE ARTERY: ICD-10-CM

## 2019-10-28 DIAGNOSIS — E78.5 DYSLIPIDEMIA: ICD-10-CM

## 2019-10-28 DIAGNOSIS — I48.0 PAROXYSMAL ATRIAL FIBRILLATION (HCC): ICD-10-CM

## 2019-10-28 DIAGNOSIS — I10 BENIGN ESSENTIAL HYPERTENSION: ICD-10-CM

## 2019-10-28 DIAGNOSIS — Z00.00 MEDICARE ANNUAL WELLNESS VISIT, SUBSEQUENT: Primary | ICD-10-CM

## 2019-10-28 DIAGNOSIS — I50.32 CHRONIC DIASTOLIC CONGESTIVE HEART FAILURE (HCC): ICD-10-CM

## 2019-10-28 PROCEDURE — 99214 OFFICE O/P EST MOD 30 MIN: CPT | Performed by: FAMILY MEDICINE

## 2019-10-28 PROCEDURE — G0439 PPPS, SUBSEQ VISIT: HCPCS | Performed by: FAMILY MEDICINE

## 2019-10-28 NOTE — PATIENT INSTRUCTIONS
Advance Directive    Advance directives are legal documents that let you make choices ahead of time about your health care and medical treatment in case you become unable to communicate for yourself. Advance directives are a way for you to communicate your wishes to family, friends, and health care providers. This can help convey your decisions about end-of-life care if you become unable to communicate.  Discussing and writing advance directives should happen over time rather than all at once. Advance directives can be changed depending on your situation and what you want, even after you have signed the advance directives.  If you do not have an advance directive, some states assign family decision makers to act on your behalf based on how closely you are related to them. Each state has its own laws regarding advance directives. You may want to check with your health care provider, , or state representative about the laws in your state. There are different types of advance directives, such as:  · Medical power of .  · Living will.  · Do not resuscitate (DNR) or do not attempt resuscitation (DNAR) order.  Health care proxy and medical power of   A health care proxy, also called a health care agent, is a person who is appointed to make medical decisions for you in cases in which you are unable to make the decisions yourself. Generally, people choose someone they know well and trust to represent their preferences. Make sure to ask this person for an agreement to act as your proxy. A proxy may have to exercise judgment in the event of a medical decision for which your wishes are not known.  A medical power of  is a legal document that names your health care proxy. Depending on the laws in your state, after the document is written, it may also need to be:  · Signed.  · Notarized.  · Dated.  · Copied.  · Witnessed.  · Incorporated into your medical record.  You may also want to appoint  someone to manage your financial affairs in a situation in which you are unable to do so. This is called a durable power of  for finances. It is a separate legal document from the durable power of  for health care. You may choose the same person or someone different from your health care proxy to act as your agent in financial matters.  If you do not appoint a proxy, or if there is a concern that the proxy is not acting in your best interests, a court-appointed guardian may be designated to act on your behalf.  Living will  A living will is a set of instructions documenting your wishes about medical care when you cannot express them yourself. Health care providers should keep a copy of your living will in your medical record. You may want to give a copy to family members or friends. To alert caregivers in case of an emergency, you can place a card in your wallet to let them know that you have a living will and where they can find it. A living will is used if you become:  · Terminally ill.  · Incapacitated.  · Unable to communicate or make decisions.  Items to consider in your living will include:  · The use or non-use of life-sustaining equipment, such as dialysis machines and breathing machines (ventilators).  · A DNR or DNAR order, which is the instruction not to use cardiopulmonary resuscitation (CPR) if breathing or heartbeat stops.  · The use or non-use of tube feeding.  · Withholding of food and fluids.  · Comfort (palliative) care when the goal becomes comfort rather than a cure.  · Organ and tissue donation.  A living will does not give instructions for distributing your money and property if you should pass away. It is recommended that you seek the advice of a  when writing a will. Decisions about taxes, beneficiaries, and asset distribution will be legally binding. This process can relieve your family and friends of any concerns surrounding disputes or questions that may come up about  the distribution of your assets.  DNR or DNAR  A DNR or DNAR order is a request not to have CPR in the event that your heart stops beating or you stop breathing. If a DNR or DNAR order has not been made and shared, a health care provider will try to help any patient whose heart has stopped or who has stopped breathing. If you plan to have surgery, talk with your health care provider about how your DNR or DNAR order will be followed if problems occur.  Summary  · Advance directives are the legal documents that allow you to make choices ahead of time about your health care and medical treatment in case you become unable to communicate for yourself.  · The process of discussing and writing advance directives should happen over time. You can change the advance directives, even after you have signed them.  · Advance directives include DNR or DNAR orders, living bach, and designating an agent as your medical power of .  This information is not intended to replace advice given to you by your health care provider. Make sure you discuss any questions you have with your health care provider.  Document Released: 03/26/2009 Document Revised: 11/06/2017 Document Reviewed: 11/06/2017  [employer] Interactive Patient Education © 2019 [employer]      Fall Prevention in the Home, Adult  Falls can cause injuries and can affect people from all age groups. There are many simple things that you can do to make your home safe and to help prevent falls. Ask for help when making these changes, if needed.  What actions can I take to prevent falls?  General instructions  · Use good lighting in all rooms. Replace any light bulbs that burn out.  · Turn on lights if it is dark. Use night-lights.  · Place frequently used items in easy-to-reach places. Lower the shelves around your home if necessary.  · Set up furniture so that there are clear paths around it. Avoid moving your furniture around.  · Remove throw rugs and other tripping  hazards from the floor.  · Avoid walking on wet floors.  · Fix any uneven floor surfaces.  · Add color or contrast paint or tape to grab bars and handrails in your home. Place contrasting color strips on the first and last steps of stairways.  · When you use a stepladder, make sure that it is completely opened and that the sides are firmly locked. Have someone hold the ladder while you are using it. Do not climb a closed stepladder.  · Be aware of any and all pets.  What can I do in the bathroom?    · Keep the floor dry. Immediately clean up any water that spills onto the floor.  · Remove soap buildup in the tub or shower on a regular basis.  · Use non-skid mats or decals on the floor of the tub or shower.  · Attach bath mats securely with double-sided, non-slip rug tape.  · If you need to sit down while you are in the shower, use a plastic, non-slip stool.  · Install grab bars by the toilet and in the tub and shower. Do not use towel bars as grab bars.  What can I do in the bedroom?  · Make sure that a bedside light is easy to reach.  · Do not use oversized bedding that drapes onto the floor.  · Have a firm chair that has side arms to use for getting dressed.  What can I do in the kitchen?  · Clean up any spills right away.  · If you need to reach for something above you, use a sturdy step stool that has a grab bar.  · Keep electrical cables out of the way.  · Do not use floor polish or wax that makes floors slippery. If you must use wax, make sure that it is non-skid floor wax.  What can I do in the stairways?  · Do not leave any items on the stairs.  · Make sure that you have a light switch at the top of the stairs and the bottom of the stairs. Have them installed if you do not have them.  · Make sure that there are handrails on both sides of the stairs. Fix handrails that are broken or loose. Make sure that handrails are as long as the stairways.  · Install non-slip stair treads on all stairs in your  home.  · Avoid having throw rugs at the top or bottom of stairways, or secure the rugs with carpet tape to prevent them from moving.  · Choose a carpet design that does not hide the edge of steps on the stairway.  · Check any carpeting to make sure that it is firmly attached to the stairs. Fix any carpet that is loose or worn.  What can I do on the outside of my home?  · Use bright outdoor lighting.  · Regularly repair the edges of walkways and driveways and fix any cracks.  · Remove high doorway thresholds.  · Trim any shrubbery on the main path into your home.  · Regularly check that handrails are securely fastened and in good repair. Both sides of any steps should have handrails.  · Install guardrails along the edges of any raised decks or porches.  · Clear walkways of debris and clutter, including tools and rocks.  · Have leaves, snow, and ice cleared regularly.  · Use sand or salt on walkways during winter months.  · In the garage, clean up any spills right away, including grease or oil spills.  What other actions can I take?  · Wear closed-toe shoes that fit well and support your feet. Wear shoes that have rubber soles or low heels.  · Use mobility aids as needed, such as canes, walkers, scooters, and crutches.  · Review your medicines with your health care provider. Some medicines can cause dizziness or changes in blood pressure, which increase your risk of falling.  Talk with your health care provider about other ways that you can decrease your risk of falls. This may include working with a physical therapist or  to improve your strength, balance, and endurance.  Where to find more information  · Centers for Disease Control and Prevention, [place]: https://www.cdc.gov  · National [place] on Aging: https://mf2qjjj.gloria.nih.gov  Contact a health care provider if:  · You are afraid of falling at home.  · You feel weak, drowsy, or dizzy at home.  · You fall at home.  Summary  · There are many simple  things that you can do to make your home safe and to help prevent falls.  · Ways to make your home safe include removing tripping hazards and installing grab bars in the bathroom.  · Ask for help when making these changes in your home.  This information is not intended to replace advice given to you by your health care provider. Make sure you discuss any questions you have with your health care provider.  Document Released: 12/08/2003 Document Revised: 08/02/2018 Document Reviewed: 08/02/2018  [employer] Interactive Patient Education © 2019 [employer]      Heart Failure    Heart failure means your heart has trouble pumping blood. This makes it hard for your body to work well. Heart failure is usually a long-term (chronic) condition. You must take good care of yourself and follow your treatment plan from your doctor.  Follow these instructions at home:  Medicines  · Take over-the-counter and prescription medicines only as told by your doctor.  ? Do not stop taking your medicine unless your doctor told you to do that.  ? Do not skip any doses.  ? Refill your prescriptions before you run out of medicine. You need your medicines every day.  Eating and drinking    · Eat heart-healthy foods. Talk with a diet and nutrition specialist (dietitian) to make an eating plan.  · Choose foods that:  ? Have no trans fat.  ? Are low in saturated fat and cholesterol.  · Choose healthy foods, like:  ? Fresh or frozen fruits and vegetables.  ? Fish.  ? Low-fat (lean) meats.  ? Legumes (like beans, peas, and lentils).  ? Fat-free or low-fat dairy products.  ? Whole-grain foods.  ? High-fiber foods.  · Limit salt (sodium) if told by your doctor. Ask your nutrition specialist to recommend heart-healthy seasonings.  · Cook in healthy ways instead of frying. Healthy ways of cooking include:  ? Roasting.  ? Grilling.  ? Broiling.  ? Baking.  ? Poaching.  ? Steaming.  ? Stir-frying.  · Limit how much fluid you drink, if told by your  doctor.  Lifestyle  · Do not smoke or use chewing tobacco. Do not use nicotine gum or patches before talking to your doctor.  · Limit alcohol intake to no more than 1 drink a day for non-pregnant women and 2 drinks a day for men. One drink equals 12 oz of beer, 5 oz of wine, or 1½ oz of hard liquor.  ? Tell your doctor if you drink alcohol many times a week.  ? Talk with your doctor about whether any alcohol is safe for you.  ? You should stop drinking alcohol:  ? If your heart has been damaged by alcohol.  ? You have very bad heart failure.  · Do not use illegal drugs.  · Lose weight if told by your doctor.  · Do moderate physical activity if told by your doctor. Ask your doctor what activities are safe for you if:  ? You are of older age (elderly).  ? You have very bad heart failure.  Keep track of important information  · Weigh yourself every day.  ? Weigh yourself every morning after you pee (urinate) and before breakfast.  ? Wear the same amount of clothing each time.  ? Write down your daily weight. Give your record to your doctor.  · Check and write down your blood pressure as told by your doctor.  · Check your pulse as told by your doctor.  Dealing with heat and cold  · If the weather is very hot:  ? Avoid activity that takes a lot of energy.  ? Use air conditioning or fans, or find a cooler place.  ? Avoid caffeine.  ? Avoid alcohol.  ? Wear clothing that is loose-fitting, lightweight, and light-colored.  · If the weather is very cold:  ? Avoid activity that takes a lot of energy.  ? Layer your clothes.  ? Wear mittens or gloves, a hat, and a scarf when you go outside.  ? Avoid alcohol.  General instructions  · Manage other conditions that you have as told by your doctor.  · Learn to manage stress. If you need help, ask your doctor.  · Plan rest periods for when you get tired.  · Get education and support as needed.  · Get rehab (rehabilitation) to help you stay independent and to help with everyday  tasks.  · Stay up to date with shots (immunizations), especially pneumococcal and flu (influenza) shots.  · Keep all follow-up visits as told by your doctor. This is important.  Contact a doctor if:  · You gain weight quickly.  · You are more short of breath than normal.  · You cannot do your normal activities.  · You tire easily.  · You cough more than normal, especially with activity.  · You have any or more puffiness (swelling) in areas such as your hands, feet, ankles, or belly (abdomen).  · You cannot sleep because it is hard to breathe.  · You feel like your heart is beating fast (palpitations).  · You get dizzy or light-headed when you stand up.  Get help right away if:  · You have trouble breathing.  · You or someone else notices a change in your awareness. This could be trouble staying awake or trouble concentrating.  · You have chest pain or discomfort.  · You pass out (faint).  Summary  · Heart failure means your heart has trouble pumping blood.  · Make sure you refill your prescriptions before you run out of medicine. You need your medicines every day.  · Keep records of your weight and blood pressure to give to your doctor.  · Contact a doctor if you gain weight quickly.  This information is not intended to replace advice given to you by your health care provider. Make sure you discuss any questions you have with your health care provider.  Document Released: 09/26/2009 Document Revised: 01/09/2018 Document Reviewed: 01/09/2018  [employer] Interactive Patient Education © 2019 [employer]

## 2019-10-28 NOTE — PROGRESS NOTES
QUICK REFERENCE INFORMATION:  The ABCs of the Annual Wellness Visit    Subsequent Medicare Wellness Visit    HEALTH RISK ASSESSMENT    1/19/1928    Recent Hospitalizations:  Admit to [facility] for chest pain;  Had negative nuclear, EF 34% by calculation.        Current Medical Providers:  Patient Care Team:  Joaquín Reich DO as PCP - General  Joaquín Reich DO as PCP - Claims Attributed        Smoking Status:  Social History     Tobacco Use   Smoking Status Never Smoker   Smokeless Tobacco Never Used       Alcohol Consumption:  Social History     Substance and Sexual Activity   Alcohol Use No       Depression Screen:   PHQ-2/PHQ-9 Depression Screening 10/28/2019   Little interest or pleasure in doing things 1   Feeling down, depressed, or hopeless 1   Trouble falling or staying asleep, or sleeping too much 1   Feeling tired or having little energy 1   Poor appetite or overeating 1   Feeling bad about yourself - or that you are a failure or have let yourself or your family down 0   Trouble concentrating on things, such as reading the newspaper or watching television 1   Moving or speaking so slowly that other people could have noticed. Or the opposite - being so fidgety or restless that you have been moving around a lot more than usual 0   Thoughts that you would be better off dead, or of hurting yourself in some way 0   Total Score 6   If you checked off any problems, how difficult have these problems made it for you to do your work, take care of things at home, or get along with other people? Somewhat difficult       Health Habits and Functional and Cognitive Screening:  Functional & Cognitive Status 10/28/2019   Do you have difficulty preparing food and eating? Yes   Do you have difficulty bathing yourself, getting dressed or grooming yourself? Yes   Do you have difficulty using the toilet? No   Do you have difficulty moving around from place to place? Yes   Do you have trouble with steps or getting out of a  bed or a chair? Yes   Current Diet Limited Junk Food   Dental Exam Up to date   Eye Exam Up to date   Exercise (times per week) 0 times per week   Current Exercise Activities Include None   Do you need help using the phone?  No   Are you deaf or do you have serious difficulty hearing?  Yes   Do you need help with transportation? Yes   Do you need help shopping? Yes   Do you need help preparing meals?  Yes   Do you need help with housework?  Yes   Do you need help with laundry? Yes   Do you need help taking your medications? Yes   Do you need help managing money? Yes   Do you ever drive or ride in a car without wearing a seat belt? No   Have you felt unusual stress, anger or loneliness in the last month? Yes   Who do you live with? Child   If you need help, do you have trouble finding someone available to you? No   Have you been bothered in the last four weeks by sexual problems? No   Do you have difficulty concentrating, remembering or making decisions? Yes           Does the patient have evidence of cognitive impairment? Yes    Aspirin use counseling: On clopidrogel as an alternative (due to ASA contraindication)      Recent Lab Results:  CMP:  Lab Results   Component Value Date     (H) 04/29/2019    BUN 49 (H) 04/29/2019    CREATININE 1.89 (H) 04/29/2019    EGFRIFNONA 23 (L) 04/29/2019    EGFRIFAFRI 26 (L) 04/29/2019    BCR 26 04/29/2019     04/29/2019    K 4.9 04/29/2019    CO2 24 04/29/2019    CALCIUM 9.1 04/29/2019    PROTENTOTREF 5.9 (L) 04/29/2019    ALBUMIN 3.9 04/29/2019    LABGLOBREF 2.0 04/29/2019    LABIL2 2.0 04/29/2019    BILITOT 0.8 04/29/2019    ALKPHOS 29 (L) 04/29/2019    AST 27 04/29/2019    ALT 19 04/29/2019     Lipid Panel:  Lab Results   Component Value Date    TRIG 93 04/29/2019    HDL 47 04/29/2019    VLDL 19 04/29/2019     HbA1c:  Lab Results   Component Value Date    HGBA1C 5.4 01/18/2019       Visual Acuity:  No exam data present    Age-appropriate Screening Schedule:  Refer  to the list below for future screening recommendations based on patient's age, sex and/or medical conditions. Orders for these recommended tests are listed in the plan section. The patient has been provided with a written plan.    Health Maintenance   Topic Date Due   • TDAP/TD VACCINES (1 - Tdap) 01/19/1947   • MAMMOGRAM  05/31/2019   • HEMOGLOBIN A1C  07/18/2019   • INFLUENZA VACCINE  08/01/2019   • PNEUMOCOCCAL VACCINES (65+ LOW/MEDIUM RISK) (2 of 2 - PPSV23) 08/13/2019   • DXA SCAN  09/14/2019   • DIABETIC EYE EXAM  04/03/2020   • URINE MICROALBUMIN  10/28/2020        Subjective   History of Present Illness    Lisa Grant is a 89 y.o. female who presents for an Subsequent Wellness Visit.    The following portions of the patient's history were reviewed and updated as appropriate: allergies, current medications, past family history, past medical history, past social history, past surgical history and problem list.    Outpatient Medications Prior to Visit   Medication Sig Dispense Refill   • alendronate (FOSAMAX) 70 MG tablet TAKE 1 TABLET BY MOUTH ONCE A WEEK 12 tablet 1   • amiodarone (PACERONE) 200 MG tablet Take 1 tablet by mouth Daily.     • aspirin 81 MG chewable tablet Chew 81 mg Daily.     • bumetanide (BUMEX) 1 MG tablet TAKE 1 TABLET BY MOUTH ONCE DAILY CALL  OFFICE  FOR  APPT 90 tablet 1   • CALCIUM PO Take  by mouth.     • clopidogrel (PLAVIX) 75 MG tablet Take 75 mg by mouth Daily.     • levothyroxine (SYNTHROID, LEVOTHROID) 88 MCG tablet TAKE 1 TABLET BY MOUTH ONCE DAILY 90 tablet 1   • liothyronine (CYTOMEL) 25 MCG tablet TAKE 1/2 (ONE-HALF) TABLET BY MOUTH ONCE DAILY 45 tablet 5   • losartan (COZAAR) 100 MG tablet Take 1 tablet by mouth Daily. 30 tablet 5   • melatonin 5 MG tablet tablet Take 5 mg by mouth Every Night.     • metoprolol tartrate (LOPRESSOR) 50 MG tablet Take 1 tablet by mouth 3 (Three) Times a Day. 270 tablet 0   • nitroglycerin (NITROLINGUAL) 0.4 MG/SPRAY spray Nitroglycerin 0.4  MG/SPRAY Translingual Solution; Patient Sig: Nitroglycerin 0.4 MG/SPRAY Translingual Solution USE AS DIRECTED.; 0; 11-Sep-2013; Active     • pantoprazole (PROTONIX) 40 MG EC tablet Take 40 mg by mouth Daily.     • pravastatin (PRAVACHOL) 20 MG tablet TAKE 1 TABLET BY MOUTH ONCE DAILY 30 tablet 5   • spironolactone (ALDACTONE) 25 MG tablet Take 12.5 mg by mouth Daily.       No facility-administered medications prior to visit.        Patient Active Problem List   Diagnosis   • Paroxysmal atrial fibrillation (CMS/HCC)   • Angina pectoris (CMS/HCC)   • Anxiety   • Atherosclerosis of aorta (CMS/HCC)   • Coronary arteriosclerosis in native artery   • Benign essential hypertension   • Chronic kidney disease   • Claudication (CMS/HCC)   • Congestive heart failure (CMS/HCC)   • Constipation   • Dry skin dermatitis   • Dyslipidemia   • Gastroesophageal reflux disease with esophagitis   • Glaucoma   • Hypothyroidism   • Knee pain   • Onychomycosis   • Primary insomnia   • Tinea pedis   • Vitamin D deficiency       Advance Care Planning:  Patient does not have an advance directive - information provided to the patient today    Identification of Risk Factors:  Risk factors include: Fall Risk.    Review of Systems   Respiratory: Negative for shortness of breath.    Cardiovascular: Negative for chest pain, palpitations, orthopnea and PND.   Gastrointestinal: Negative for abdominal pain and change in bowel habit.       Compared to one year ago, the patient feels her physical health is the same.  Compared to one year ago, the patient feels her mental health is the same.    Objective     Physical Exam   Constitutional: She appears well-developed and well-nourished.   HENT:   Head: Normocephalic and atraumatic.   Neck: Neck supple. No JVD present. No thyromegaly present.   Cardiovascular: Normal rate, regular rhythm and normal heart sounds. Exam reveals no gallop and no friction rub.   No murmur heard.  Pulmonary/Chest: Effort normal  and breath sounds normal. No respiratory distress. She has no wheezes. She has no rales.   Abdominal: Soft. Bowel sounds are normal. She exhibits no distension. There is no tenderness. There is no rebound and no guarding.   Musculoskeletal: She exhibits no edema.   Neurological: She is alert.   Skin: Skin is warm and dry.   Psychiatric: She has a normal mood and affect. Her behavior is normal.   Nursing note and vitals reviewed.      Vitals:    10/28/19 1426   BP: 120/50   Pulse: 55   Temp: 97.5 °F (36.4 °C)   SpO2: 97%   Weight: 54 kg (119 lb)       Patient's Body mass index is 22.48 kg/m². BMI is within normal parameters. No follow-up required..      Assessment/Plan   Patient Self-Management and Personalized Health Advice  The patient has been provided with information about: fall prevention and preventive services including:   · Annual Wellness Visit (AWV).    Visit Diagnoses:    ICD-10-CM ICD-9-CM   1. Medicare annual wellness visit, subsequent Z00.00 V70.0   2. Benign essential hypertension I10 401.1   3. Coronary arteriosclerosis in native artery I25.10 414.01   4. Dyslipidemia E78.5 272.4   5. Paroxysmal atrial fibrillation (CMS/[facility]) I48.0 427.31   6. Other specified hypothyroidism E03.8 244.8   7. Chronic diastolic congestive heart failure (CMS/[facility]) I50.32 428.32     428.0       Orders Placed This Encounter   Procedures   • Comprehensive Metabolic Panel   • Lipid Panel   • TSH   • T4, Free   • T3, Free       Outpatient Encounter Medications as of 10/28/2019   Medication Sig Dispense Refill   • alendronate (FOSAMAX) 70 MG tablet TAKE 1 TABLET BY MOUTH ONCE A WEEK 12 tablet 1   • amiodarone (PACERONE) 200 MG tablet Take 1 tablet by mouth Daily.     • aspirin 81 MG chewable tablet Chew 81 mg Daily.     • bumetanide (BUMEX) 1 MG tablet TAKE 1 TABLET BY MOUTH ONCE DAILY CALL  OFFICE  FOR  APPT 90 tablet 1   • CALCIUM PO Take  by mouth.     • clopidogrel (PLAVIX) 75 MG tablet Take 75 mg by mouth Daily.     •  levothyroxine (SYNTHROID, LEVOTHROID) 88 MCG tablet TAKE 1 TABLET BY MOUTH ONCE DAILY 90 tablet 1   • liothyronine (CYTOMEL) 25 MCG tablet TAKE 1/2 (ONE-HALF) TABLET BY MOUTH ONCE DAILY 45 tablet 5   • losartan (COZAAR) 100 MG tablet Take 1 tablet by mouth Daily. 30 tablet 5   • melatonin 5 MG tablet tablet Take 5 mg by mouth Every Night.     • metoprolol tartrate (LOPRESSOR) 50 MG tablet Take 1 tablet by mouth 3 (Three) Times a Day. 270 tablet 0   • nitroglycerin (NITROLINGUAL) 0.4 MG/SPRAY spray Nitroglycerin 0.4 MG/SPRAY Translingual Solution; Patient Sig: Nitroglycerin 0.4 MG/SPRAY Translingual Solution USE AS DIRECTED.; 0; 11-Sep-2013; Active     • pantoprazole (PROTONIX) 40 MG EC tablet Take 40 mg by mouth Daily.     • pravastatin (PRAVACHOL) 20 MG tablet TAKE 1 TABLET BY MOUTH ONCE DAILY 30 tablet 5   • spironolactone (ALDACTONE) 25 MG tablet Take 12.5 mg by mouth Daily.       No facility-administered encounter medications on file as of 10/28/2019.        Reviewed use of high risk medication in the elderly: yes  Reviewed for potential of harmful drug interactions in the elderly: yes    Follow Up:  Return in about 6 months (around 4/28/2020), or if symptoms worsen or fail to improve.     An After Visit Summary and PPPS with all of these plans were given to the patient.           ++++++++++++++++++++++++++++++++++++++++++++++++++++++++++++++++++   Chief Complaint   Patient presents with   • Annual Exam     medicare wellness exam   • Hypertension   • Hyperlipidemia   • Coronary Artery Disease   • Hypothyroidism   • Atrial Fibrillation     Here for chronic disease management and above.    Hypertension   This is a chronic problem. The current episode started more than 1 year ago. The problem is controlled. Associated symptoms include peripheral edema. Pertinent negatives include no chest pain, orthopnea, palpitations, PND or shortness of breath. (Swelling better of late;  Hx of diastolic CHF chronically;  Sees  cardiology tomorrow.) There are no associated agents to hypertension. Risk factors for coronary artery disease include dyslipidemia and post-menopausal state. The current treatment provides moderate improvement. Hypertensive end-organ damage includes CAD/MI.   Hyperlipidemia   This is a chronic problem. The current episode started more than 1 year ago. The problem is controlled. Recent lipid tests were reviewed and are normal. Exacerbating diseases include hypothyroidism. Pertinent negatives include no chest pain or shortness of breath. Current antihyperlipidemic treatment includes statins. Risk factors for coronary artery disease include hypertension and dyslipidemia.   Coronary Artery Disease   Presents for follow-up visit. Pertinent negatives include no chest pain, palpitations or shortness of breath. Risk factors include hyperlipidemia. Compliance with diet is good. Compliance with exercise is poor. Compliance with medications is good.   Hypothyroidism   This is a chronic problem. The current episode started more than 1 year ago. Pertinent negatives include no abdominal pain, change in bowel habit or chest pain. Treatments tried: on T4 and T3, due recheck.       Review of Systems   Cardiovascular: Negative for chest pain, orthopnea, palpitations and paroxysmal nocturnal dyspnea.   Respiratory: Negative for shortness of breath.    Gastrointestinal: Negative for abdominal pain and change in bowel habit.       Social History     Tobacco Use   • Smoking status: Never Smoker   • Smokeless tobacco: Never Used   Substance Use Topics   • Alcohol use: No     O:   Vitals:    10/28/19 1426   BP: 120/50   Pulse: 55   Temp: 97.5 °F (36.4 °C)   SpO2: 97%   Weight: 54 kg (119 lb)       Physical Exam   Constitutional: She appears well-developed and well-nourished.   HENT:   Head: Normocephalic and atraumatic.   Neck: Neck supple. No JVD present. No thyromegaly present.   Cardiovascular: Normal rate, regular rhythm and normal  heart sounds. Exam reveals no gallop and no friction rub.   No murmur heard.  Pulmonary/Chest: Effort normal and breath sounds normal. No respiratory distress. She has no wheezes. She has no rales.   Abdominal: Soft. Bowel sounds are normal. She exhibits no distension. There is no tenderness. There is no rebound and no guarding.   Musculoskeletal: She exhibits no edema.   Neurological: She is alert.   Skin: Skin is warm and dry.   Psychiatric: She has a normal mood and affect. Her behavior is normal.   Nursing note and vitals reviewed.      Lisa was seen today for annual exam, hypertension, hyperlipidemia, coronary artery disease, hypothyroidism and atrial fibrillation.    Diagnoses and all orders for this visit:    Medicare annual wellness visit, subsequent    Benign essential hypertension  -     Comprehensive Metabolic Panel    Coronary arteriosclerosis in native artery  -     Comprehensive Metabolic Panel  -     Lipid Panel    Dyslipidemia  -     Lipid Panel    Paroxysmal atrial fibrillation (CMS/HCC)    Other specified hypothyroidism  -     TSH  -     T4, Free  -     T3, Free    Chronic diastolic congestive heart failure (CMS/HCC)    -a. Fib - stable, off AC as hx of GI bleed  -cad - continue medications;  Continue bp and lipid control;  appt with cardiology tomorrow Dr. myers sent delayed message to staff to fax when back.  -hypertension - controlled, continue medications  -HLD - continue statin, recheck lipids.  -chf - avoid salt, follow low Na diet;  euvolemic today  -hypothyroidism - continue medication, recheck thyroid labs.      Return in about 6 months (around 4/28/2020), or if symptoms worsen or fail to improve.

## 2019-10-29 LAB
ALBUMIN SERPL-MCNC: 3.4 G/DL (ref 3.2–4.6)
ALBUMIN/GLOB SERPL: 1.5 {RATIO} (ref 1.2–2.2)
ALP SERPL-CCNC: 31 IU/L (ref 39–117)
ALT SERPL-CCNC: 17 IU/L (ref 0–32)
AST SERPL-CCNC: 24 IU/L (ref 0–40)
BILIRUB SERPL-MCNC: 0.5 MG/DL (ref 0–1.2)
BUN SERPL-MCNC: 44 MG/DL (ref 10–36)
BUN/CREAT SERPL: 25 (ref 12–28)
CALCIUM SERPL-MCNC: 9.1 MG/DL (ref 8.7–10.3)
CHLORIDE SERPL-SCNC: 102 MMOL/L (ref 96–106)
CHOLEST SERPL-MCNC: 124 MG/DL (ref 100–199)
CO2 SERPL-SCNC: 24 MMOL/L (ref 20–29)
CREAT SERPL-MCNC: 1.78 MG/DL (ref 0.57–1)
GLOBULIN SER CALC-MCNC: 2.3 G/DL (ref 1.5–4.5)
GLUCOSE SERPL-MCNC: 97 MG/DL (ref 65–99)
HDLC SERPL-MCNC: 52 MG/DL
LDLC SERPL CALC-MCNC: 50 MG/DL (ref 0–99)
POTASSIUM SERPL-SCNC: 4.9 MMOL/L (ref 3.5–5.2)
PROT SERPL-MCNC: 5.7 G/DL (ref 6–8.5)
SODIUM SERPL-SCNC: 140 MMOL/L (ref 134–144)
T3FREE SERPL-MCNC: 2.1 PG/ML (ref 2–4.4)
T4 FREE SERPL-MCNC: 1.63 NG/DL (ref 0.82–1.77)
TRIGL SERPL-MCNC: 109 MG/DL (ref 0–149)
TSH SERPL DL<=0.005 MIU/L-ACNC: 0.41 UIU/ML (ref 0.45–4.5)
VLDLC SERPL CALC-MCNC: 22 MG/DL (ref 5–40)

## 2019-11-13 RX ORDER — ALENDRONATE SODIUM 70 MG/1
TABLET ORAL
Qty: 12 TABLET | Refills: 1 | Status: SHIPPED | OUTPATIENT
Start: 2019-11-13 | End: 2020-05-18

## 2019-11-25 RX ORDER — LEVOTHYROXINE SODIUM 88 UG/1
TABLET ORAL
Qty: 90 TABLET | Refills: 1 | Status: SHIPPED | OUTPATIENT
Start: 2019-11-25 | End: 2020-05-26

## 2019-12-04 ENCOUNTER — INPATIENT HOSPITAL (OUTPATIENT)
Dept: URBAN - METROPOLITAN AREA HOSPITAL 90 | Facility: HOSPITAL | Age: 84
End: 2019-12-04

## 2019-12-04 DIAGNOSIS — R07.89 OTHER CHEST PAIN: ICD-10-CM

## 2019-12-04 DIAGNOSIS — E11.9 TYPE 2 DIABETES MELLITUS WITHOUT COMPLICATIONS: ICD-10-CM

## 2019-12-04 DIAGNOSIS — K21.9 GASTRO-ESOPHAGEAL REFLUX DISEASE WITHOUT ESOPHAGITIS: ICD-10-CM

## 2019-12-04 PROCEDURE — 99223 1ST HOSP IP/OBS HIGH 75: CPT

## 2019-12-13 RX ORDER — BUMETANIDE 1 MG/1
1 TABLET ORAL DAILY
Qty: 90 TABLET | Refills: 0 | Status: SHIPPED | OUTPATIENT
Start: 2019-12-13 | End: 2020-02-12

## 2020-01-03 RX ORDER — PRAVASTATIN SODIUM 20 MG
TABLET ORAL
Qty: 90 TABLET | Refills: 1 | Status: SHIPPED | OUTPATIENT
Start: 2020-01-03 | End: 2020-06-29

## 2020-02-12 RX ORDER — BUMETANIDE 1 MG/1
TABLET ORAL
Qty: 90 TABLET | Refills: 0 | Status: SHIPPED | OUTPATIENT
Start: 2020-02-12 | End: 2020-03-16

## 2020-03-16 RX ORDER — BUMETANIDE 1 MG/1
TABLET ORAL
Qty: 90 TABLET | Refills: 0 | Status: SHIPPED | OUTPATIENT
Start: 2020-03-16 | End: 2020-07-01

## 2020-05-18 RX ORDER — ALENDRONATE SODIUM 70 MG/1
TABLET ORAL
Qty: 12 TABLET | Refills: 0 | Status: SHIPPED | OUTPATIENT
Start: 2020-05-18 | End: 2020-08-24

## 2020-05-26 RX ORDER — LEVOTHYROXINE SODIUM 88 UG/1
TABLET ORAL
Qty: 90 TABLET | Refills: 0 | Status: SHIPPED | OUTPATIENT
Start: 2020-05-26 | End: 2020-06-02

## 2020-06-02 RX ORDER — LEVOTHYROXINE SODIUM 88 UG/1
88 TABLET ORAL DAILY
Qty: 90 TABLET | Refills: 0 | Status: SHIPPED | OUTPATIENT
Start: 2020-06-02 | End: 2020-09-24 | Stop reason: SDUPTHER

## 2020-06-02 RX ORDER — LIOTHYRONINE SODIUM 25 UG/1
12.5 TABLET ORAL DAILY
Qty: 15 TABLET | Refills: 0 | Status: SHIPPED | OUTPATIENT
Start: 2020-06-02 | End: 2020-08-31

## 2020-06-29 RX ORDER — PRAVASTATIN SODIUM 20 MG
TABLET ORAL
Qty: 90 TABLET | Refills: 0 | Status: SHIPPED | OUTPATIENT
Start: 2020-06-29 | End: 2020-09-30

## 2020-07-01 RX ORDER — BUMETANIDE 1 MG/1
1 TABLET ORAL DAILY
Qty: 90 TABLET | Refills: 0 | Status: SHIPPED | OUTPATIENT
Start: 2020-07-01 | End: 2020-12-14

## 2020-08-06 ENCOUNTER — TELEPHONE (OUTPATIENT)
Dept: FAMILY MEDICINE CLINIC | Facility: CLINIC | Age: 85
End: 2020-08-06

## 2020-08-06 RX ORDER — DICYCLOMINE HYDROCHLORIDE 10 MG/1
10 CAPSULE ORAL 4 TIMES DAILY PRN
Qty: 20 CAPSULE | Refills: 0 | Status: SHIPPED | OUTPATIENT
Start: 2020-08-06

## 2020-08-06 NOTE — TELEPHONE ENCOUNTER
Prior to this past couple weeks has she had constipation or infrequent bowel movements?  Given age, I suspect she may have fecal impaction which can lead to seepage.  I would start metamucil 1 scoop twice daily.  I did send something for cramping, but if fecal impaction, anything to slow down diarrhea actually make worse.  The metamucil will bulk stool and also soften, but will take time to work.

## 2020-08-24 RX ORDER — ALENDRONATE SODIUM 70 MG/1
TABLET ORAL
Qty: 12 TABLET | Refills: 0 | Status: SHIPPED | OUTPATIENT
Start: 2020-08-24 | End: 2020-12-08

## 2020-08-31 RX ORDER — LIOTHYRONINE SODIUM 25 UG/1
TABLET ORAL
Qty: 7 TABLET | Refills: 0 | Status: SHIPPED | OUTPATIENT
Start: 2020-08-31 | End: 2020-09-24 | Stop reason: SDUPTHER

## 2020-09-03 ENCOUNTER — TELEPHONE (OUTPATIENT)
Dept: FAMILY MEDICINE CLINIC | Facility: CLINIC | Age: 85
End: 2020-09-03

## 2020-09-03 NOTE — TELEPHONE ENCOUNTER
I spoke with pt's daughter and told her to stop the metamucil and try eating foods easy to digest like the brat diet then as things get better start to add in other foods.

## 2020-09-17 RX ORDER — LIOTHYRONINE SODIUM 25 UG/1
TABLET ORAL
Qty: 7 TABLET | Refills: 0 | OUTPATIENT
Start: 2020-09-17

## 2020-09-21 RX ORDER — LIOTHYRONINE SODIUM 25 UG/1
TABLET ORAL
Qty: 7 TABLET | Refills: 0 | OUTPATIENT
Start: 2020-09-21

## 2020-09-24 ENCOUNTER — OFFICE VISIT (OUTPATIENT)
Dept: FAMILY MEDICINE CLINIC | Facility: CLINIC | Age: 85
End: 2020-09-24

## 2020-09-24 VITALS
OXYGEN SATURATION: 99 % | DIASTOLIC BLOOD PRESSURE: 68 MMHG | SYSTOLIC BLOOD PRESSURE: 112 MMHG | HEART RATE: 66 BPM | HEIGHT: 66 IN | BODY MASS INDEX: 21.21 KG/M2 | WEIGHT: 132 LBS

## 2020-09-24 DIAGNOSIS — I48.0 PAROXYSMAL ATRIAL FIBRILLATION (HCC): ICD-10-CM

## 2020-09-24 DIAGNOSIS — Z23 FLU VACCINE NEED: ICD-10-CM

## 2020-09-24 DIAGNOSIS — I10 BENIGN ESSENTIAL HYPERTENSION: Primary | ICD-10-CM

## 2020-09-24 DIAGNOSIS — E55.9 VITAMIN D DEFICIENCY: ICD-10-CM

## 2020-09-24 DIAGNOSIS — E03.8 OTHER SPECIFIED HYPOTHYROIDISM: ICD-10-CM

## 2020-09-24 DIAGNOSIS — I25.10 CORONARY ARTERIOSCLEROSIS IN NATIVE ARTERY: ICD-10-CM

## 2020-09-24 DIAGNOSIS — E78.5 DYSLIPIDEMIA: ICD-10-CM

## 2020-09-24 DIAGNOSIS — N18.30 STAGE 3 CHRONIC KIDNEY DISEASE (HCC): ICD-10-CM

## 2020-09-24 PROCEDURE — G0008 ADMIN INFLUENZA VIRUS VAC: HCPCS | Performed by: FAMILY MEDICINE

## 2020-09-24 PROCEDURE — 90694 VACC AIIV4 NO PRSRV 0.5ML IM: CPT | Performed by: FAMILY MEDICINE

## 2020-09-24 PROCEDURE — 99214 OFFICE O/P EST MOD 30 MIN: CPT | Performed by: FAMILY MEDICINE

## 2020-09-24 RX ORDER — LEVOTHYROXINE SODIUM 88 UG/1
88 TABLET ORAL DAILY
Qty: 90 TABLET | Refills: 1 | Status: SHIPPED | OUTPATIENT
Start: 2020-09-24 | End: 2021-04-12

## 2020-09-24 RX ORDER — LIOTHYRONINE SODIUM 25 UG/1
TABLET ORAL
Qty: 45 TABLET | Refills: 1 | Status: SHIPPED | OUTPATIENT
Start: 2020-09-24 | End: 2021-04-12

## 2020-09-25 LAB
25(OH)D3+25(OH)D2 SERPL-MCNC: 26.2 NG/ML (ref 30–100)
ALBUMIN SERPL-MCNC: 3.6 G/DL (ref 3.5–4.6)
ALBUMIN/GLOB SERPL: 1.9 {RATIO} (ref 1.2–2.2)
ALP SERPL-CCNC: 45 IU/L (ref 39–117)
ALT SERPL-CCNC: 12 IU/L (ref 0–32)
AST SERPL-CCNC: 20 IU/L (ref 0–40)
BASOPHILS # BLD AUTO: 0 X10E3/UL (ref 0–0.2)
BASOPHILS NFR BLD AUTO: 1 %
BILIRUB SERPL-MCNC: 0.4 MG/DL (ref 0–1.2)
BUN SERPL-MCNC: 23 MG/DL (ref 10–36)
BUN/CREAT SERPL: 16 (ref 12–28)
CALCIUM SERPL-MCNC: 9.2 MG/DL (ref 8.7–10.3)
CHLORIDE SERPL-SCNC: 101 MMOL/L (ref 96–106)
CHOLEST SERPL-MCNC: 122 MG/DL (ref 100–199)
CO2 SERPL-SCNC: 23 MMOL/L (ref 20–29)
CREAT SERPL-MCNC: 1.48 MG/DL (ref 0.57–1)
EOSINOPHIL # BLD AUTO: 0.1 X10E3/UL (ref 0–0.4)
EOSINOPHIL NFR BLD AUTO: 2 %
ERYTHROCYTE [DISTWIDTH] IN BLOOD BY AUTOMATED COUNT: 14.4 % (ref 11.7–15.4)
GLOBULIN SER CALC-MCNC: 1.9 G/DL (ref 1.5–4.5)
GLUCOSE SERPL-MCNC: 129 MG/DL (ref 65–99)
HCT VFR BLD AUTO: 32.6 % (ref 34–46.6)
HDLC SERPL-MCNC: 43 MG/DL
HGB BLD-MCNC: 10.1 G/DL (ref 11.1–15.9)
IMM GRANULOCYTES # BLD AUTO: 0 X10E3/UL (ref 0–0.1)
IMM GRANULOCYTES NFR BLD AUTO: 1 %
LDLC SERPL CALC-MCNC: 51 MG/DL (ref 0–99)
LYMPHOCYTES # BLD AUTO: 1 X10E3/UL (ref 0.7–3.1)
LYMPHOCYTES NFR BLD AUTO: 24 %
MCH RBC QN AUTO: 26.6 PG (ref 26.6–33)
MCHC RBC AUTO-ENTMCNC: 31 G/DL (ref 31.5–35.7)
MCV RBC AUTO: 86 FL (ref 79–97)
MONOCYTES # BLD AUTO: 0.5 X10E3/UL (ref 0.1–0.9)
MONOCYTES NFR BLD AUTO: 12 %
NEUTROPHILS # BLD AUTO: 2.5 X10E3/UL (ref 1.4–7)
NEUTROPHILS NFR BLD AUTO: 60 %
PLATELET # BLD AUTO: 204 X10E3/UL (ref 150–450)
POTASSIUM SERPL-SCNC: 4.7 MMOL/L (ref 3.5–5.2)
PROT SERPL-MCNC: 5.5 G/DL (ref 6–8.5)
RBC # BLD AUTO: 3.79 X10E6/UL (ref 3.77–5.28)
SODIUM SERPL-SCNC: 135 MMOL/L (ref 134–144)
T3FREE SERPL-MCNC: 1.5 PG/ML (ref 2–4.4)
T4 FREE SERPL-MCNC: 1.74 NG/DL (ref 0.82–1.77)
TRIGL SERPL-MCNC: 168 MG/DL (ref 0–149)
TSH SERPL DL<=0.005 MIU/L-ACNC: 0.7 UIU/ML (ref 0.45–4.5)
VLDLC SERPL CALC-MCNC: 28 MG/DL (ref 5–40)
WBC # BLD AUTO: 4.1 X10E3/UL (ref 3.4–10.8)

## 2020-09-27 NOTE — PROGRESS NOTES
Call and mail copy of results to patient.  Cholesterol well controlled  Thyroid ok range  Vitamin D low, I would take vitamin D3 4000 iu po daily OTC.  Kidney function decline is stable  Blood counts have dropped, any black or bloody stools?  Return to check b12, folate, ferritin and iron profile.  Also check FOBT x1.  Dx anemia nos

## 2020-09-28 DIAGNOSIS — D64.9 ANEMIA, UNSPECIFIED TYPE: Primary | ICD-10-CM

## 2020-09-30 RX ORDER — PRAVASTATIN SODIUM 20 MG
TABLET ORAL
Qty: 90 TABLET | Refills: 1 | Status: SHIPPED | OUTPATIENT
Start: 2020-09-30 | End: 2021-04-02

## 2020-09-30 NOTE — PROGRESS NOTES
Subjective   Lisa Grant is a 89 y.o. female. Presents today for   Chief Complaint   Patient presents with   • Hypertension   • Hyperlipidemia   • Hypothyroidism   • Atrial Fibrillation   • Coronary Artery Disease       Hypertension  This is a chronic problem. The current episode started more than 1 year ago. The problem is unchanged. The problem is controlled. Associated symptoms include peripheral edema. Pertinent negatives include no chest pain, orthopnea, palpitations, PND or shortness of breath. Risk factors for coronary artery disease include obesity, dyslipidemia, sedentary lifestyle and post-menopausal state. The current treatment provides moderate improvement. Hypertensive end-organ damage includes kidney disease and CAD/MI. Identifiable causes of hypertension include chronic renal disease (stage III).   Hyperlipidemia  This is a chronic problem. The current episode started more than 1 year ago. The problem is controlled. Exacerbating diseases include chronic renal disease (stage III) and hypothyroidism. Factors aggravating her hyperlipidemia include beta blockers. Pertinent negatives include no chest pain or shortness of breath. Current antihyperlipidemic treatment includes statins. The current treatment provides moderate improvement of lipids.   Hypothyroidism  This is a chronic problem. The problem occurs constantly. Pertinent negatives include no abdominal pain, change in bowel habit or chest pain. Exacerbated by: on amiodarone. Treatments tried: Due recheck labs.   Atrial Fibrillation  Presents for follow-up visit. Symptoms are negative for chest pain, dizziness, hypertension, hypotension, palpitations and shortness of breath. The symptoms have been stable. Past medical history includes atrial fibrillation, CAD and hyperlipidemia.   Coronary Artery Disease  Presents for follow-up visit. Symptoms include leg swelling (stable and better than past; sits with legs down long periods of time). Pertinent  negatives include no chest pain, chest pressure, chest tightness, dizziness, palpitations, shortness of breath or weight gain. Risk factors include hyperlipidemia. Risk factors do not include hypertension.       Review of Systems   Constitutional: Negative for weight gain.   Respiratory: Negative for chest tightness and shortness of breath.    Cardiovascular: Positive for leg swelling (stable and better than past; sits with legs down long periods of time). Negative for chest pain, palpitations, orthopnea and PND.   Gastrointestinal: Negative for abdominal pain and change in bowel habit.   Neurological: Negative for dizziness.       Patient Active Problem List   Diagnosis   • Paroxysmal atrial fibrillation (CMS/HCC)   • Angina pectoris (CMS/HCC)   • Anxiety   • Atherosclerosis of aorta (CMS/HCC)   • Coronary arteriosclerosis in native artery   • Benign essential hypertension   • Chronic kidney disease   • Congestive heart failure (CMS/HCC)   • Constipation   • Dry skin dermatitis   • Dyslipidemia   • Gastroesophageal reflux disease with esophagitis   • Glaucoma   • Hypothyroidism   • Knee pain   • Onychomycosis   • Primary insomnia   • Tinea pedis   • Vitamin D deficiency       Social History     Socioeconomic History   • Marital status:      Spouse name: Not on file   • Number of children: Not on file   • Years of education: Not on file   • Highest education level: Not on file   Tobacco Use   • Smoking status: Never Smoker   • Smokeless tobacco: Never Used   Substance and Sexual Activity   • Alcohol use: No   • Drug use: No       Allergies   Allergen Reactions   • Codeine    • Hydrocodone-Acetaminophen    • Metoclopramide    • Oxycodone    • Propoxyphene        Current Outpatient Medications on File Prior to Visit   Medication Sig Dispense Refill   • alendronate (FOSAMAX) 70 MG tablet Take 1 tablet by mouth once a week 12 tablet 0   • amiodarone (PACERONE) 200 MG tablet Take 1 tablet by mouth Daily.     •  "aspirin 81 MG chewable tablet Chew 81 mg Daily.     • bumetanide (BUMEX) 1 MG tablet Take 1 tablet by mouth Daily. PLEASE CALL THE OFFICE FOR AN APPT 90 tablet 0   • CALCIUM PO Take  by mouth.     • clopidogrel (PLAVIX) 75 MG tablet Take 75 mg by mouth Daily.     • dicyclomine (Bentyl) 10 MG capsule Take 1 capsule by mouth 4 (Four) Times a Day As Needed (cramping). 20 capsule 0   • losartan (COZAAR) 100 MG tablet Take 1 tablet by mouth Daily. 30 tablet 5   • melatonin 5 MG tablet tablet Take 5 mg by mouth Every Night.     • metoprolol tartrate (LOPRESSOR) 50 MG tablet Take 1 tablet by mouth 3 (Three) Times a Day. 270 tablet 0   • nitroglycerin (NITROLINGUAL) 0.4 MG/SPRAY spray Nitroglycerin 0.4 MG/SPRAY Translingual Solution; Patient Sig: Nitroglycerin 0.4 MG/SPRAY Translingual Solution USE AS DIRECTED.; 0; 11-Sep-2013; Active     • pantoprazole (PROTONIX) 40 MG EC tablet Take 40 mg by mouth Daily.     • spironolactone (ALDACTONE) 25 MG tablet Take 12.5 mg by mouth Daily.     • [DISCONTINUED] pravastatin (PRAVACHOL) 20 MG tablet Take 1 tablet by mouth once daily 90 tablet 0     No current facility-administered medications on file prior to visit.        Objective   Vitals:    09/24/20 1524   BP: 112/68   BP Location: Right arm   Patient Position: Sitting   Cuff Size: Adult   Pulse: 66   SpO2: 99%   Weight: 59.9 kg (132 lb)   Height: 167.6 cm (66\")     Body mass index is 21.31 kg/m².    Physical Exam  Vitals signs and nursing note reviewed.   Constitutional:       Appearance: She is well-developed.   HENT:      Head: Normocephalic and atraumatic.   Neck:      Musculoskeletal: Neck supple.      Thyroid: No thyromegaly.      Vascular: No JVD.   Cardiovascular:      Rate and Rhythm: Normal rate and regular rhythm.      Heart sounds: Normal heart sounds. No murmur. No friction rub. No gallop.    Pulmonary:      Effort: Pulmonary effort is normal. No respiratory distress.      Breath sounds: Normal breath sounds. No " wheezing or rales.   Abdominal:      General: Bowel sounds are normal. There is no distension.      Palpations: Abdomen is soft.      Tenderness: There is no abdominal tenderness. There is no guarding or rebound.   Musculoskeletal:      Right lower leg: Edema present.      Left lower leg: Edema present.   Skin:     General: Skin is warm and dry.   Neurological:      Mental Status: She is alert.      Comments: In wc today   Psychiatric:         Behavior: Behavior normal.         Assessment/Plan   Lisa was seen today for hypertension, hyperlipidemia, hypothyroidism, atrial fibrillation and coronary artery disease.    Diagnoses and all orders for this visit:    Benign essential hypertension  -     Comprehensive Metabolic Panel    Coronary arteriosclerosis in native artery  -     Comprehensive Metabolic Panel  -     Lipid Panel    Other specified hypothyroidism  -     levothyroxine (Euthyrox) 88 MCG tablet; Take 1 tablet by mouth Daily. With liothyronine  -     liothyronine (CYTOMEL) 25 MCG tablet; 1/2 po daily with levothyroxine    Paroxysmal atrial fibrillation (CMS/HCC)  -     TSH  -     T4, Free  -     T3, Free    Stage 3 chronic kidney disease (CMS/HCC)  -     Comprehensive Metabolic Panel  -     CBC & Differential    Dyslipidemia  -     Comprehensive Metabolic Panel  -     Lipid Panel    Flu vaccine need  -     Fluad Quad 65+ yrs ([id number])    Vitamin D deficiency  -     Vitamin D 25 Hydroxy    -cad RF reduction, Lipid, BP control.  -hypertension - controlled, continue medications  -HLD - continue statin, recheck lipids.  -ckd -avoid nephrotoxins, bp control  -a. Fib stable  -due recheck vitamin D today         -Follow up:

## 2020-10-03 LAB
FERRITIN SERPL-MCNC: 22 NG/ML (ref 15–150)
FOLATE SERPL-MCNC: 18.8 NG/ML
IRON SATN MFR SERPL: 12 % (ref 15–55)
IRON SERPL-MCNC: 36 UG/DL (ref 27–139)
TIBC SERPL-MCNC: 309 UG/DL (ref 250–450)
UIBC SERPL-MCNC: 273 UG/DL (ref 118–369)
VIT B12 SERPL-MCNC: 285 PG/ML (ref 232–1245)

## 2020-12-08 RX ORDER — ALENDRONATE SODIUM 70 MG/1
TABLET ORAL
Qty: 12 TABLET | Refills: 2 | Status: SHIPPED | OUTPATIENT
Start: 2020-12-08 | End: 2021-10-12

## 2020-12-14 RX ORDER — BUMETANIDE 1 MG/1
1 TABLET ORAL DAILY
Qty: 90 TABLET | Refills: 1 | Status: SHIPPED | OUTPATIENT
Start: 2020-12-14

## 2021-04-02 RX ORDER — PRAVASTATIN SODIUM 20 MG
TABLET ORAL
Qty: 90 TABLET | Refills: 0 | Status: SHIPPED | OUTPATIENT
Start: 2021-04-02 | End: 2021-06-28

## 2021-04-10 DIAGNOSIS — E03.8 OTHER SPECIFIED HYPOTHYROIDISM: ICD-10-CM

## 2021-04-12 RX ORDER — LEVOTHYROXINE SODIUM 88 UG/1
TABLET ORAL
Qty: 90 TABLET | Refills: 1 | Status: SHIPPED | OUTPATIENT
Start: 2021-04-12 | End: 2021-10-13

## 2021-04-12 RX ORDER — LIOTHYRONINE SODIUM 25 UG/1
TABLET ORAL
Qty: 45 TABLET | Refills: 1 | Status: SHIPPED | OUTPATIENT
Start: 2021-04-12 | End: 2021-12-06

## 2021-06-28 RX ORDER — PRAVASTATIN SODIUM 20 MG
TABLET ORAL
Qty: 90 TABLET | Refills: 0 | Status: SHIPPED | OUTPATIENT
Start: 2021-06-28 | End: 2021-10-27 | Stop reason: SDUPTHER

## 2021-10-12 RX ORDER — ALENDRONATE SODIUM 70 MG/1
TABLET ORAL
Qty: 12 TABLET | Refills: 0 | Status: SHIPPED | OUTPATIENT
Start: 2021-10-12 | End: 2022-04-27

## 2021-10-13 DIAGNOSIS — E03.8 OTHER SPECIFIED HYPOTHYROIDISM: ICD-10-CM

## 2021-10-13 RX ORDER — LEVOTHYROXINE SODIUM 88 UG/1
TABLET ORAL
Qty: 90 TABLET | Refills: 0 | Status: SHIPPED | OUTPATIENT
Start: 2021-10-13 | End: 2022-02-08

## 2021-10-26 RX ORDER — PRAVASTATIN SODIUM 20 MG
TABLET ORAL
Qty: 90 TABLET | Refills: 0 | OUTPATIENT
Start: 2021-10-26

## 2021-10-27 RX ORDER — PRAVASTATIN SODIUM 20 MG
20 TABLET ORAL DAILY
Qty: 90 TABLET | Refills: 0 | Status: SHIPPED | OUTPATIENT
Start: 2021-10-27 | End: 2021-10-27

## 2021-10-27 RX ORDER — PRAVASTATIN SODIUM 20 MG
TABLET ORAL
Qty: 90 TABLET | Refills: 0 | Status: SHIPPED | OUTPATIENT
Start: 2021-10-27 | End: 2022-07-07

## 2021-11-04 ENCOUNTER — OFFICE VISIT (OUTPATIENT)
Dept: FAMILY MEDICINE CLINIC | Facility: CLINIC | Age: 86
End: 2021-11-04

## 2021-11-04 VITALS
WEIGHT: 140 LBS | HEIGHT: 64 IN | BODY MASS INDEX: 23.9 KG/M2 | DIASTOLIC BLOOD PRESSURE: 82 MMHG | OXYGEN SATURATION: 99 % | SYSTOLIC BLOOD PRESSURE: 124 MMHG | HEART RATE: 79 BPM

## 2021-11-04 DIAGNOSIS — M15.9 PRIMARY OSTEOARTHRITIS INVOLVING MULTIPLE JOINTS: ICD-10-CM

## 2021-11-04 DIAGNOSIS — I10 BENIGN ESSENTIAL HYPERTENSION: ICD-10-CM

## 2021-11-04 DIAGNOSIS — I25.10 CORONARY ARTERIOSCLEROSIS IN NATIVE ARTERY: ICD-10-CM

## 2021-11-04 DIAGNOSIS — Z00.00 MEDICARE ANNUAL WELLNESS VISIT, SUBSEQUENT: Primary | ICD-10-CM

## 2021-11-04 DIAGNOSIS — E78.5 DYSLIPIDEMIA: ICD-10-CM

## 2021-11-04 DIAGNOSIS — I48.0 PAROXYSMAL ATRIAL FIBRILLATION (HCC): ICD-10-CM

## 2021-11-04 DIAGNOSIS — G89.29 CHRONIC LEFT-SIDED LOW BACK PAIN WITHOUT SCIATICA: ICD-10-CM

## 2021-11-04 DIAGNOSIS — S61.502A: ICD-10-CM

## 2021-11-04 DIAGNOSIS — M54.50 CHRONIC LEFT-SIDED LOW BACK PAIN WITHOUT SCIATICA: ICD-10-CM

## 2021-11-04 DIAGNOSIS — E03.8 OTHER SPECIFIED HYPOTHYROIDISM: ICD-10-CM

## 2021-11-04 PROBLEM — Z98.890 OTHER SPECIFIED POSTPROCEDURAL STATES: Status: ACTIVE | Noted: 2017-08-29

## 2021-11-04 PROCEDURE — 1170F FXNL STATUS ASSESSED: CPT | Performed by: FAMILY MEDICINE

## 2021-11-04 PROCEDURE — 99214 OFFICE O/P EST MOD 30 MIN: CPT | Performed by: FAMILY MEDICINE

## 2021-11-04 PROCEDURE — 1159F MED LIST DOCD IN RCRD: CPT | Performed by: FAMILY MEDICINE

## 2021-11-04 PROCEDURE — G0439 PPPS, SUBSEQ VISIT: HCPCS | Performed by: FAMILY MEDICINE

## 2021-11-04 PROCEDURE — 1126F AMNT PAIN NOTED NONE PRSNT: CPT | Performed by: FAMILY MEDICINE

## 2021-11-04 NOTE — PATIENT INSTRUCTIONS
Advance Directive    Advance directives are legal documents that let you make choices ahead of time about your health care and medical treatment in case you become unable to communicate for yourself. Advance directives are a way for you to make known your wishes to family, friends, and health care providers. This can let others know about your end-of-life care if you become unable to communicate.  Discussing and writing advance directives should happen over time rather than all at once. Advance directives can be changed depending on your situation and what you want, even after you have signed the advance directives.  There are different types of advance directives, such as:  · Medical power of .  · Living will.  · Do not resuscitate (DNR) or do not attempt resuscitation (DNAR) order.  Health care proxy and medical power of   A health care proxy is also called a health care agent. This is a person who is appointed to make medical decisions for you in cases where you are unable to make the decisions yourself. Generally, people choose someone they know well and trust to represent their preferences. Make sure to ask this person for an agreement to act as your proxy. A proxy may have to exercise judgment in the event of a medical decision for which your wishes are not known.  A medical power of  is a legal document that names your health care proxy. Depending on the laws in your state, after the document is written, it may also need to be:  · Signed.  · Notarized.  · Dated.  · Copied.  · Witnessed.  · Incorporated into your medical record.  You may also want to appoint someone to manage your money in a situation in which you are unable to do so. This is called a durable power of  for finances. It is a separate legal document from the durable power of  for health care. You may choose the same person or someone different from your health care proxy to act as your agent in money  matters.  If you do not appoint a proxy, or if there is a concern that the proxy is not acting in your best interests, a court may appoint a guardian to act on your behalf.  Living will  A living will is a set of instructions that state your wishes about medical care when you cannot express them yourself. Health care providers should keep a copy of your living will in your medical record. You may want to give a copy to family members or friends. To alert caregivers in case of an emergency, you can place a card in your wallet to let them know that you have a living will and where they can find it. A living will is used if you become:  · Terminally ill.  · Disabled.  · Unable to communicate or make decisions.  Items to consider in your living will include:  · To use or not to use life-support equipment, such as dialysis machines and breathing machines (ventilators).  · A DNR or DNAR order. This tells health care providers not to use cardiopulmonary resuscitation (CPR) if breathing or heartbeat stops.  · To use or not to use tube feeding.  · To be given or not to be given food and fluids.  · Comfort (palliative) care when the goal becomes comfort rather than a cure.  · Donation of organs and tissues.  A living will does not give instructions for distributing your money and property if you should pass away.  DNR or DNAR  A DNR or DNAR order is a request not to have CPR in the event that your heart stops beating or you stop breathing. If a DNR or DNAR order has not been made and shared, a health care provider will try to help any patient whose heart has stopped or who has stopped breathing. If you plan to have surgery, talk with your health care provider about how your DNR or DNAR order will be followed if problems occur.  What if I do not have an advance directive?  If you do not have an advance directive, some states assign family decision makers to act on your behalf based on how closely you are related to them. Each  state has its own laws about advance directives. You may want to check with your health care provider, , or state representative about the laws in your state.  Summary  · Advance directives are the legal documents that allow you to make choices ahead of time about your health care and medical treatment in case you become unable to tell others about your care.  · The process of discussing and writing advance directives should happen over time. You can change the advance directives, even after you have signed them.  · Advance directives include DNR or DNAR orders, living bach, and designating an agent as your medical power of .  This information is not intended to replace advice given to you by your health care provider. Make sure you discuss any questions you have with your health care provider.  Document Revised: 01/28/2021 Document Reviewed: 07/16/2020  [employer]vier Patient Education © 2021 [employer]      Calorie Counting for Weight Loss  Calories are units of energy. Your body needs a certain number of calories from food to keep going throughout the day. When you eat or drink more calories than your body needs, your body stores the extra calories mostly as fat. When you eat or drink fewer calories than your body needs, your body burns fat to get the energy it needs.  Calorie counting means keeping track of how many calories you eat and drink each day. Calorie counting can be helpful if you need to lose weight. If you eat fewer calories than your body needs, you should lose weight. Ask your health care provider what a healthy weight is for you.  For calorie counting to work, you will need to eat the right number of calories each day to lose a healthy amount of weight per week. A dietitian can help you figure out how many calories you need in a day and will suggest ways to reach your calorie goal.  · A healthy amount of weight to lose each week is usually 1-2 lb (0.5-0.9 kg). This usually means that  your daily calorie intake should be reduced by 500-750 calories.  · Eating 1,200-1,500 calories a day can help most women lose weight.  · Eating 1,500-1,800 calories a day can help most men lose weight.  What do I need to know about calorie counting?  Work with your health care provider or dietitian to determine how many calories you should get each day. To meet your daily calorie goal, you will need to:  · Find out how many calories are in each food that you would like to eat. Try to do this before you eat.  · Decide how much of the food you plan to eat.  · Keep a food log. Do this by writing down what you ate and how many calories it had.  To successfully lose weight, it is important to balance calorie counting with a healthy lifestyle that includes regular activity.  Where do I find calorie information?    The number of calories in a food can be found on a Nutrition Facts label. If a food does not have a Nutrition Facts label, try to look up the calories online or ask your dietitian for help.  Remember that calories are listed per serving. If you choose to have more than one serving of a food, you will have to multiply the calories per serving by the number of servings you plan to eat. For example, the label on a package of bread might say that a serving size is 1 slice and that there are 90 calories in a serving. If you eat 1 slice, you will have eaten 90 calories. If you eat 2 slices, you will have eaten 180 calories.  How do I keep a food log?  After each time that you eat, record the following in your food log as soon as possible:  · What you ate. Be sure to include toppings, sauces, and other extras on the food.  · How much you ate. This can be measured in cups, ounces, or number of items.  · How many calories were in each food and drink.  · The total number of calories in the food you ate.  Keep your food log near you, such as in a pocket-sized notebook or on an danny or website on your mobile phone. Some  programs will calculate calories for you and show you how many calories you have left to meet your daily goal.  What are some portion-control tips?  · Know how many calories are in a serving. This will help you know how many servings you can have of a certain food.  · Use a measuring cup to measure serving sizes. You could also try weighing out portions on a kitchen scale. With time, you will be able to estimate serving sizes for some foods.  · Take time to put servings of different foods on your favorite plates or in your favorite bowls and cups so you know what a serving looks like.  · Try not to eat straight from a food's packaging, such as from a bag or box. Eating straight from the package makes it hard to see how much you are eating and can lead to overeating. Put the amount you would like to eat in a cup or on a plate to make sure you are eating the right portion.  · Use smaller plates, glasses, and bowls for smaller portions and to prevent overeating.  · Try not to multitask. For example, avoid watching TV or using your computer while eating. If it is time to eat, sit down at a table and enjoy your food. This will help you recognize when you are full. It will also help you be more mindful of what and how much you are eating.  What are tips for following this plan?  Reading food labels  · Check the calorie count compared with the serving size. The serving size may be smaller than what you are used to eating.  · Check the source of the calories. Try to choose foods that are high in protein, fiber, and vitamins, and low in saturated fat, trans fat, and sodium.  Shopping  · Read nutrition labels while you shop. This will help you make healthy decisions about which foods to buy.  · Pay attention to nutrition labels for low-fat or fat-free foods. These foods sometimes have the same number of calories or more calories than the full-fat versions. They also often have added sugar, starch, or salt to make up for  flavor that was removed with the fat.  · Make a grocery list of lower-calorie foods and stick to it.  Cooking  · Try to cook your favorite foods in a healthier way. For example, try baking instead of frying.  · Use low-fat dairy products.  Meal planning  · Use more fruits and vegetables. One-half of your plate should be fruits and vegetables.  · Include lean proteins, such as chicken, turkey, and fish.  Lifestyle  Each week, aim to do one of the following:  · 150 minutes of moderate exercise, such as walking.  · 75 minutes of vigorous exercise, such as running.  General information  · Know how many calories are in the foods you eat most often. This will help you calculate calorie counts faster.  · Find a way of tracking calories that works for you. Get creative. Try different apps or programs if writing down calories does not work for you.  What foods should I eat?    · Eat nutritious foods. It is better to have a nutritious, high-calorie food, such as an avocado, than a food with few nutrients, such as a bag of potato chips.  · Use your calories on foods and drinks that will fill you up and will not leave you hungry soon after eating.  ? Examples of foods that fill you up are nuts and nut butters, vegetables, lean proteins, and high-fiber foods such as whole grains. High-fiber foods are foods with more than 5 g of fiber per serving.  · Pay attention to calories in drinks. Low-calorie drinks include water and unsweetened drinks.  The items listed above may not be a complete list of foods and beverages you can eat. Contact a dietitian for more information.  What foods should I limit?  Limit foods or drinks that are not good sources of vitamins, minerals, or protein or that are high in unhealthy fats. These include:  · Candy.  · Other sweets.  · Sodas, specialty coffee drinks, alcohol, and juice.  The items listed above may not be a complete list of foods and beverages you should avoid. Contact a dietitian for more  information.  How do I count calories when eating out?  · Pay attention to portions. Often, portions are much larger when eating out. Try these tips to keep portions smaller:  ? Consider sharing a meal instead of getting your own.  ? If you get your own meal, eat only half of it. Before you start eating, ask for a container and put half of your meal into it.  ? When available, consider ordering smaller portions from the menu instead of full portions.  · Pay attention to your food and drink choices. Knowing the way food is cooked and what is included with the meal can help you eat fewer calories.  ? If calories are listed on the menu, choose the lower-calorie options.  ? Choose dishes that include vegetables, fruits, whole grains, low-fat dairy products, and lean proteins.  ? Choose items that are boiled, broiled, grilled, or steamed. Avoid items that are buttered, battered, fried, or served with cream sauce. Items labeled as crispy are usually fried, unless stated otherwise.  ? Choose water, low-fat milk, unsweetened iced tea, or other drinks without added sugar. If you want an alcoholic beverage, choose a lower-calorie option, such as a glass of wine or light beer.  ? Ask for dressings, sauces, and syrups on the side. These are usually high in calories, so you should limit the amount you eat.  ? If you want a salad, choose a garden salad and ask for grilled meats. Avoid extra toppings such as mabry, cheese, or fried items. Ask for the dressing on the side, or ask for olive oil and vinegar or lemon to use as dressing.  · Estimate how many servings of a food you are given. Knowing serving sizes will help you be aware of how much food you are eating at restaurants.  Where to find more information  · Centers for Disease Control and Prevention: www.cdc.gov  · [place] Department of Agriculture: myplate.gov  Summary  · Calorie counting means keeping track of how many calories you eat and drink each day. If you eat fewer  calories than your body needs, you should lose weight.  · A healthy amount of weight to lose per week is usually 1-2 lb (0.5-0.9 kg). This usually means reducing your daily calorie intake by 500-750 calories.  · The number of calories in a food can be found on a Nutrition Facts label. If a food does not have a Nutrition Facts label, try to look up the calories online or ask your dietitian for help.  · Use smaller plates, glasses, and bowls for smaller portions and to prevent overeating.  · Use your calories on foods and drinks that will fill you up and not leave you hungry shortly after a meal.  This information is not intended to replace advice given to you by your health care provider. Make sure you discuss any questions you have with your health care provider.  Document Revised: 01/28/2021 Document Reviewed: 01/28/2021  [employer] Patient Education © 2021 [employer]      Exercising to Lose Weight  Exercise is structured, repetitive physical activity to improve fitness and health. Getting regular exercise is important for everyone. It is especially important if you are overweight. Being overweight increases your risk of heart disease, stroke, diabetes, high blood pressure, and several types of cancer. Reducing your calorie intake and exercising can help you lose weight.  Exercise is usually categorized as moderate or vigorous intensity. To lose weight, most people need to do a certain amount of moderate-intensity or vigorous-intensity exercise each week.  Moderate-intensity exercise    Moderate-intensity exercise is any activity that gets you moving enough to burn at least three times more energy (calories) than if you were sitting.  Examples of moderate exercise include:  · Walking a mile in 15 minutes.  · Doing light yard work.  · Biking at an easy pace.  Most people should get at least 150 minutes (2 hours and 30 minutes) a week of moderate-intensity exercise to maintain their body weight.  Vigorous-intensity  exercise  Vigorous-intensity exercise is any activity that gets you moving enough to burn at least six times more calories than if you were sitting. When you exercise at this intensity, you should be working hard enough that you are not able to carry on a conversation.  Examples of vigorous exercise include:  · Running.  · Playing a team sport, such as football, basketball, and soccer.  · Jumping rope.  Most people should get at least 75 minutes (1 hour and 15 minutes) a week of vigorous-intensity exercise to maintain their body weight.  How can exercise affect me?  When you exercise enough to burn more calories than you eat, you lose weight. Exercise also reduces body fat and builds muscle. The more muscle you have, the more calories you burn. Exercise also:  · Improves mood.  · Reduces stress and tension.  · Improves your overall fitness, flexibility, and endurance.  · Increases bone strength.  The amount of exercise you need to lose weight depends on:  · Your age.  · The type of exercise.  · Any health conditions you have.  · Your overall physical ability.  Talk to your health care provider about how much exercise you need and what types of activities are safe for you.  What actions can I take to lose weight?  Nutrition    · Make changes to your diet as told by your health care provider or diet and nutrition specialist (dietitian). This may include:  ? Eating fewer calories.  ? Eating more protein.  ? Eating less unhealthy fats.  ? Eating a diet that includes fresh fruits and vegetables, whole grains, low-fat dairy products, and lean protein.  ? Avoiding foods with added fat, salt, and sugar.  · Drink plenty of water while you exercise to prevent dehydration or heat stroke.    Activity  · Choose an activity that you enjoy and set realistic goals. Your health care provider can help you make an exercise plan that works for you.  · Exercise at a moderate or vigorous intensity most days of the week.  ? The intensity  of exercise may vary from person to person. You can tell how intense a workout is for you by paying attention to your breathing and heartbeat. Most people will notice their breathing and heartbeat get faster with more intense exercise.  · Do resistance training twice each week, such as:  ? Push-ups.  ? Sit-ups.  ? Lifting weights.  ? Using resistance bands.  · Getting short amounts of exercise can be just as helpful as long structured periods of exercise. If you have trouble finding time to exercise, try to include exercise in your daily routine.  ? Get up, stretch, and walk around every 30 minutes throughout the day.  ? Go for a walk during your lunch break.  ? Park your car farther away from your destination.  ? If you take public transportation, get off one stop early and walk the rest of the way.  ? Make phone calls while standing up and walking around.  ? Take the stairs instead of elevators or escalators.  · Wear comfortable clothes and shoes with good support.  · Do not exercise so much that you hurt yourself, feel dizzy, or get very short of breath.  Where to find more information  · [place] Department of Health and Human Services: www.hhs.gov  · Centers for Disease Control and Prevention (CDC): www.cdc.gov  Contact a health care provider:  · Before starting a new exercise program.  · If you have questions or concerns about your weight.  · If you have a medical problem that keeps you from exercising.  Get help right away if you have any of the following while exercising:  · Injury.  · Dizziness.  · Difficulty breathing or shortness of breath that does not go away when you stop exercising.  · Chest pain.  · Rapid heartbeat.  Summary  · Being overweight increases your risk of heart disease, stroke, diabetes, high blood pressure, and several types of cancer.  · Losing weight happens when you burn more calories than you eat.  · Reducing the amount of calories you eat in addition to getting regular moderate or  vigorous exercise each week helps you lose weight.  This information is not intended to replace advice given to you by your health care provider. Make sure you discuss any questions you have with your health care provider.  Document Revised: 04/15/2021 Document Reviewed: 04/15/2021  [employer] Patient Education © 2021 [employer]      Fall Prevention in the Home, Adult  Falls can cause injuries and can affect people from all age groups. There are many simple things that you can do to make your home safe and to help prevent falls. Ask for help when making these changes, if needed.  What actions can I take to prevent falls?  General instructions  · Use good lighting in all rooms. Replace any light bulbs that burn out.  · Turn on lights if it is dark. Use night-lights.  · Place frequently used items in easy-to-reach places. Lower the shelves around your home if necessary.  · Set up furniture so that there are clear paths around it. Avoid moving your furniture around.  · Remove throw rugs and other tripping hazards from the floor.  · Avoid walking on wet floors.  · Fix any uneven floor surfaces.  · Add color or contrast paint or tape to grab bars and handrails in your home. Place contrasting color strips on the first and last steps of stairways.  · When you use a stepladder, make sure that it is completely opened and that the sides are firmly locked. Have someone hold the ladder while you are using it. Do not climb a closed stepladder.  · Be aware of any and all pets.  What can I do in the bathroom?         · Keep the floor dry. Immediately clean up any water that spills onto the floor.  · Remove soap buildup in the tub or shower on a regular basis.  · Use non-skid mats or decals on the floor of the tub or shower.  · Attach bath mats securely with double-sided, non-slip rug tape.  · If you need to sit down while you are in the shower, use a plastic, non-slip stool.  · Install grab bars by the toilet and in the tub and  shower. Do not use towel bars as grab bars.  What can I do in the bedroom?  · Make sure that a bedside light is easy to reach.  · Do not use oversized bedding that drapes onto the floor.  · Have a firm chair that has side arms to use for getting dressed.  What can I do in the kitchen?  · Clean up any spills right away.  · If you need to reach for something above you, use a sturdy step stool that has a grab bar.  · Keep electrical cables out of the way.  · Do not use floor polish or wax that makes floors slippery. If you must use wax, make sure that it is non-skid floor wax.  What can I do in the stairways?  · Do not leave any items on the stairs.  · Make sure that you have a light switch at the top of the stairs and the bottom of the stairs. Have them installed if you do not have them.  · Make sure that there are handrails on both sides of the stairs. Fix handrails that are broken or loose. Make sure that handrails are as long as the stairways.  · Install non-slip stair treads on all stairs in your home.  · Avoid having throw rugs at the top or bottom of stairways, or secure the rugs with carpet tape to prevent them from moving.  · Choose a carpet design that does not hide the edge of steps on the stairway.  · Check any carpeting to make sure that it is firmly attached to the stairs. Fix any carpet that is loose or worn.  What can I do on the outside of my home?  · Use bright outdoor lighting.  · Regularly repair the edges of walkways and driveways and fix any cracks.  · Remove high doorway thresholds.  · Trim any shrubbery on the main path into your home.  · Regularly check that handrails are securely fastened and in good repair. Both sides of any steps should have handrails.  · Install guardrails along the edges of any raised decks or porches.  · Clear walkways of debris and clutter, including tools and rocks.  · Have leaves, snow, and ice cleared regularly.  · Use sand or salt on walkways during winter  months.  · In the garage, clean up any spills right away, including grease or oil spills.  What other actions can I take?  · Wear closed-toe shoes that fit well and support your feet. Wear shoes that have rubber soles or low heels.  · Use mobility aids as needed, such as canes, walkers, scooters, and crutches.  · Review your medicines with your health care provider. Some medicines can cause dizziness or changes in blood pressure, which increase your risk of falling.  Talk with your health care provider about other ways that you can decrease your risk of falls. This may include working with a physical therapist or  to improve your strength, balance, and endurance.  Where to find more information  · Centers for Disease Control and Prevention, [place]: https://www.cdc.gov  · National [place] on Aging: https://pa4fvmv.gloria.nih.gov  Contact a health care provider if:  · You are afraid of falling at home.  · You feel weak, drowsy, or dizzy at home.  · You fall at home.  Summary  · There are many simple things that you can do to make your home safe and to help prevent falls.  · Ways to make your home safe include removing tripping hazards and installing grab bars in the bathroom.  · Ask for help when making these changes in your home.  This information is not intended to replace advice given to you by your health care provider. Make sure you discuss any questions you have with your health care provider.  Document Revised: 11/30/2018 Document Reviewed: 08/02/2018  [employer] Patient Education © 2021 [employer]

## 2021-11-04 NOTE — PROGRESS NOTES
QUICK REFERENCE INFORMATION:  The ABCs of the Annual Wellness Visit    Subsequent Medicare Wellness Visit    HEALTH RISK ASSESSMENT    1/19/1928    Recent Hospitalizations:  No hospitalization(s) within the last year..        Current Medical Providers:  Patient Care Team:  Joaquín Reich DO as PCP - General        Smoking Status:  Social History     Tobacco Use   Smoking Status Never Smoker   Smokeless Tobacco Never Used       Alcohol Consumption:  Social History     Substance and Sexual Activity   Alcohol Use No       Depression Screen:   PHQ-2/PHQ-9 Depression Screening 11/4/2021   Little interest or pleasure in doing things 0   Feeling down, depressed, or hopeless 0   Trouble falling or staying asleep, or sleeping too much 0   Feeling tired or having little energy 0   Poor appetite or overeating 0   Feeling bad about yourself - or that you are a failure or have let yourself or your family down 0   Trouble concentrating on things, such as reading the newspaper or watching television 0   Moving or speaking so slowly that other people could have noticed. Or the opposite - being so fidgety or restless that you have been moving around a lot more than usual 0   Thoughts that you would be better off dead, or of hurting yourself in some way 0   Total Score 0   If you checked off any problems, how difficult have these problems made it for you to do your work, take care of things at home, or get along with other people? Not difficult at all       Health Habits and Functional and Cognitive Screening:  Functional & Cognitive Status 11/4/2021   Do you have difficulty preparing food and eating? No   Do you have difficulty bathing yourself, getting dressed or grooming yourself? No   Do you have difficulty using the toilet? No   Do you have difficulty moving around from place to place? No   Do you have trouble with steps or getting out of a bed or a chair? No   Current Diet Well Balanced Diet   Dental Exam Not up to date    Eye Exam Not up to date   Exercise (times per week) 0 times per week   Current Exercises Include No Regular Exercise   Current Exercise Activities Include -   Do you need help using the phone?  No   Are you deaf or do you have serious difficulty hearing?  Yes   Do you need help with transportation? Yes   Do you need help shopping? Yes   Do you need help preparing meals?  No   Do you need help with housework?  Yes   Do you need help with laundry? Yes   Do you need help taking your medications? Yes   Do you need help managing money? No   Do you ever drive or ride in a car without wearing a seat belt? No   Have you felt unusual stress, anger or loneliness in the last month? No   Who do you live with? Alone   If you need help, do you have trouble finding someone available to you? No   Have you been bothered in the last four weeks by sexual problems? No   Do you have difficulty concentrating, remembering or making decisions? No           Does the patient have evidence of cognitive impairment? No    Aspirin use counseling: Taking ASA appropriately as indicated      Recent Lab Results:  CMP:  Lab Results   Component Value Date    BUN 22 11/04/2021    CREATININE 1.26 (H) 11/04/2021    EGFRIFNONA 37 (L) 11/04/2021    EGFRIFAFRI 42 (L) 11/04/2021    BCR 17 11/04/2021     (L) 11/04/2021    K 4.6 11/04/2021    CO2 21 11/04/2021    CALCIUM 9.5 11/04/2021    PROTENTOTREF 6.3 11/04/2021    ALBUMIN 4.0 11/04/2021    LABGLOBREF 2.3 11/04/2021    LABIL2 1.7 11/04/2021    BILITOT 0.7 11/04/2021    ALKPHOS 51 11/04/2021    AST 22 11/04/2021    ALT 12 11/04/2021     Lipid Panel:  Lab Results   Component Value Date    TRIG 156 (H) 11/04/2021    HDL 54 11/04/2021    VLDL 27 11/04/2021     HbA1c:  Lab Results   Component Value Date    HGBA1C 5.4 01/18/2019       Visual Acuity:  No exam data present    Age-appropriate Screening Schedule:  Refer to the list below for future screening recommendations based on patient's age, sex and/or  medical conditions. Orders for these recommended tests are listed in the plan section. The patient has been provided with a written plan.    Health Maintenance   Topic Date Due   • TDAP/TD VACCINES (1 - Tdap) Never done   • MAMMOGRAM  05/31/2019   • HEMOGLOBIN A1C  07/18/2019   • DXA SCAN  09/14/2019   • URINE MICROALBUMIN  10/28/2020   • DIABETIC EYE EXAM  10/08/2021   • INFLUENZA VACCINE  Completed        Subjective   History of Present Illness    Lisa Grant is a 89 y.o. female who presents for an Subsequent Wellness Visit.    The following portions of the patient's history were reviewed and updated as appropriate: allergies, current medications, past family history, past medical history, past social history, past surgical history and problem list.    Outpatient Medications Prior to Visit   Medication Sig Dispense Refill   • alendronate (FOSAMAX) 70 MG tablet Take 1 tablet by mouth once a week 12 tablet 0   • amiodarone (PACERONE) 200 MG tablet Take 1 tablet by mouth Daily.     • aspirin 81 MG chewable tablet Chew 81 mg Daily.     • bumetanide (BUMEX) 1 MG tablet Take 1 tablet by mouth Daily. 90 tablet 1   • CALCIUM PO Take  by mouth.     • clopidogrel (PLAVIX) 75 MG tablet Take 75 mg by mouth Daily.     • dicyclomine (Bentyl) 10 MG capsule Take 1 capsule by mouth 4 (Four) Times a Day As Needed (cramping). 20 capsule 0   • Euthyrox 88 MCG tablet TAKE 1 TABLET BY MOUTH ONCE DAILY WITH  LIOTHYRONINE 90 tablet 0   • liothyronine (CYTOMEL) 25 MCG tablet TAKE 1/2 (ONE-HALF) TABLET BY MOUTH ONCE DAILY WITH  LEVOTHYROXINE 45 tablet 1   • losartan (COZAAR) 100 MG tablet Take 1 tablet by mouth Daily. 30 tablet 5   • melatonin 5 MG tablet tablet Take 5 mg by mouth Every Night.     • metoprolol tartrate (LOPRESSOR) 50 MG tablet Take 1 tablet by mouth 3 (Three) Times a Day. 270 tablet 0   • nitroglycerin (NITROLINGUAL) 0.4 MG/SPRAY spray Nitroglycerin 0.4 MG/SPRAY Translingual Solution; Patient Sig: Nitroglycerin 0.4  MG/SPRAY Translingual Solution USE AS DIRECTED.; 0; 11-Sep-2013; Active     • pantoprazole (PROTONIX) 40 MG EC tablet Take 40 mg by mouth Daily.     • pravastatin (PRAVACHOL) 20 MG tablet Take 1 tablet by mouth once daily 90 tablet 0   • spironolactone (ALDACTONE) 25 MG tablet Take 12.5 mg by mouth Daily.       No facility-administered medications prior to visit.       Patient Active Problem List   Diagnosis   • Paroxysmal atrial fibrillation (HCC)   • Angina pectoris (HCC)   • Anxiety   • Atherosclerosis of aorta (HCC)   • Coronary arteriosclerosis in native artery   • Benign essential hypertension   • Chronic kidney disease   • Congestive heart failure (HCC)   • Constipation   • Dry skin dermatitis   • Dyslipidemia   • Gastroesophageal reflux disease with esophagitis   • Glaucoma   • Hypothyroidism   • Knee pain   • Onychomycosis   • Primary insomnia   • Tinea pedis   • Vitamin D deficiency   • Cardiac pacemaker in situ   • Hypertension   • Other specified postprocedural states       Advance Care Planning:  ACP discussion was held with the patient during this visit. Patient does not have an advance directive, information provided.    Identification of Risk Factors:  Risk factors include: Fall Risk.    Review of Systems   Respiratory: Negative for chest tightness and shortness of breath.    Cardiovascular: Negative for chest pain, palpitations, orthopnea and PND.   Gastrointestinal: Negative for abdominal pain.   Musculoskeletal: Positive for back pain.   Neurological: Negative for dizziness.       Compared to one year ago, the patient feels her physical health is the same.  Compared to one year ago, the patient feels her mental health is the same.    Objective     Physical Exam  Vitals and nursing note reviewed.   Constitutional:       Appearance: She is well-developed.   HENT:      Head: Normocephalic and atraumatic.   Neck:      Thyroid: No thyromegaly.      Vascular: No JVD.   Cardiovascular:      Rate and  "Rhythm: Normal rate and regular rhythm.      Heart sounds: Normal heart sounds. No murmur heard.  No friction rub. No gallop.    Pulmonary:      Effort: Pulmonary effort is normal. No respiratory distress.      Breath sounds: Normal breath sounds. No wheezing or rales.   Abdominal:      General: Bowel sounds are normal. There is no distension.      Palpations: Abdomen is soft.      Tenderness: There is no abdominal tenderness. There is no guarding or rebound.   Musculoskeletal:      Cervical back: Neck supple.   Skin:     General: Skin is warm and dry.      Comments: Left wrist bruising and scaling in watch distribution   Neurological:      Mental Status: She is alert.   Psychiatric:         Behavior: Behavior normal.         Vitals:    11/04/21 1438   BP: 124/82   Pulse: 79   SpO2: 99%   Weight: 63.5 kg (140 lb)   Height: 162.6 cm (64\")   PainSc: 0-No pain       Patient's Body mass index is 24.03 kg/m². indicating that she is within normal range (BMI 18.5-24.9). No BMI management plan needed..      Assessment/Plan   Patient Self-Management and Personalized Health Advice  The patient has been provided with information about: fall prevention and preventive services including:   · Annual Wellness Visit (AWV).    Visit Diagnoses:    ICD-10-CM ICD-9-CM   1. Medicare annual wellness visit, subsequent  Z00.00 V70.0   2. Other specified hypothyroidism  E03.8 244.8   3. Benign essential hypertension  I10 401.1   4. Paroxysmal atrial fibrillation (HCC)  I48.0 427.31   5. Coronary arteriosclerosis in native artery  I25.10 414.01   6. Primary osteoarthritis involving multiple joints  M89.49 715.98   7. Chronic left-sided low back pain without sciatica  M54.50 724.2    G89.29 338.29   8. Dyslipidemia  E78.5 272.4   9. Open wound of left wrist with complication, initial encounter  S61.502A 881.12       Orders Placed This Encounter   Procedures   • Comprehensive Metabolic Panel     Order Specific Question:   Release to patient    "  Answer:   Immediate     Order Specific Question:   [facility] Has the patient fasted?     Answer:   No   • Lipid Panel     Order Specific Question:   [facility] Has the patient fasted?     Answer:   No   • TSH     Order Specific Question:   Release to patient     Answer:   Immediate     Order Specific Question:   [facility] Has the patient fasted?     Answer:   No   • T4, Free     Order Specific Question:   Release to patient     Answer:   Immediate     Order Specific Question:   [facility] Has the patient fasted?     Answer:   No   • T3, Free     Order Specific Question:   Release to patient     Answer:   Immediate     Order Specific Question:   [facility] Has the patient fasted?     Answer:   No       Outpatient Encounter Medications as of 11/4/2021   Medication Sig Dispense Refill   • alendronate (FOSAMAX) 70 MG tablet Take 1 tablet by mouth once a week 12 tablet 0   • amiodarone (PACERONE) 200 MG tablet Take 1 tablet by mouth Daily.     • aspirin 81 MG chewable tablet Chew 81 mg Daily.     • bumetanide (BUMEX) 1 MG tablet Take 1 tablet by mouth Daily. 90 tablet 1   • CALCIUM PO Take  by mouth.     • clopidogrel (PLAVIX) 75 MG tablet Take 75 mg by mouth Daily.     • dicyclomine (Bentyl) 10 MG capsule Take 1 capsule by mouth 4 (Four) Times a Day As Needed (cramping). 20 capsule 0   • Euthyrox 88 MCG tablet TAKE 1 TABLET BY MOUTH ONCE DAILY WITH  LIOTHYRONINE 90 tablet 0   • liothyronine (CYTOMEL) 25 MCG tablet TAKE 1/2 (ONE-HALF) TABLET BY MOUTH ONCE DAILY WITH  LEVOTHYROXINE 45 tablet 1   • losartan (COZAAR) 100 MG tablet Take 1 tablet by mouth Daily. 30 tablet 5   • melatonin 5 MG tablet tablet Take 5 mg by mouth Every Night.     • metoprolol tartrate (LOPRESSOR) 50 MG tablet Take 1 tablet by mouth 3 (Three) Times a Day. 270 tablet 0   • nitroglycerin (NITROLINGUAL) 0.4 MG/SPRAY spray Nitroglycerin 0.4 MG/SPRAY Translingual Solution; Patient Sig: Nitroglycerin 0.4 MG/SPRAY Translingual Solution USE AS DIRECTED.; 0;  11-Sep-2013; Active     • pantoprazole (PROTONIX) 40 MG EC tablet Take 40 mg by mouth Daily.     • pravastatin (PRAVACHOL) 20 MG tablet Take 1 tablet by mouth once daily 90 tablet 0   • spironolactone (ALDACTONE) 25 MG tablet Take 12.5 mg by mouth Daily.     • Diclofenac Sodium (VOLTAREN) 1 % gel gel Apply 4 g topically to the appropriate area as directed 4 (Four) Times a Day As Needed (knee/back or other arhritis pain). 100 g 5   • mupirocin (BACTROBAN) 2 % ointment Apply 1 application topically to the appropriate area as directed 2 (Two) Times a Day. 30 g 1     No facility-administered encounter medications on file as of 11/4/2021.       Reviewed use of high risk medication in the elderly: yes  Reviewed for potential of harmful drug interactions in the elderly: yes    Follow Up:  Return in about 6 months (around 5/4/2022), or if symptoms worsen or fail to improve.     An After Visit Summary and PPPS with all of these plans were given to the patient.           ++++++++++++++++++++++++++++++++++++++++++++++++++++++++++++++++++     Chief Complaint   Patient presents with   • Annual Exam     medicare   • Hypothyroidism   • Hypertension   • Hyperlipidemia     Hypothyroidism  This is a chronic problem. The current episode started more than 1 year ago. The problem occurs constantly. The problem has been unchanged. Pertinent negatives include no abdominal pain or chest pain. Nothing aggravates the symptoms. She has tried nothing for the symptoms. The treatment provided moderate relief.   Hypertension  This is a chronic problem. The current episode started more than 1 year ago. The problem is unchanged. The problem is controlled. Pertinent negatives include no chest pain, orthopnea, palpitations, peripheral edema, PND or shortness of breath. The current treatment provides moderate improvement.   Hyperlipidemia  This is a chronic problem. The current episode started more than 1 year ago. The problem is controlled. Recent  lipid tests were reviewed and are normal. Exacerbating diseases include hypothyroidism. Pertinent negatives include no chest pain or shortness of breath. The current treatment provides moderate improvement of lipids.   Coronary Artery Disease  Presents for follow-up visit. Pertinent negatives include no chest pain, chest pressure, chest tightness, dizziness, palpitations or shortness of breath. Risk factors include hyperlipidemia. Risk factors do not include hypertension. The symptoms have been stable.   Atrial Fibrillation  Presents for follow-up visit. Symptoms are negative for chest pain, dizziness, hypertension, hypotension, palpitations and shortness of breath. The symptoms have been stable. Past medical history includes atrial fibrillation, CAD and hyperlipidemia.   Back Pain  This is a recurrent problem. The current episode started 1 to 4 weeks ago. The problem occurs constantly. The problem is unchanged. The pain is present in the lumbar spine. The quality of the pain is described as aching. Pertinent negatives include no abdominal pain or chest pain. She has tried analgesics for the symptoms. The treatment provided no relief.   Osteoarthritis  Presents for follow-up visit. She complains of pain. Affected location: mulitple joints. Her past medical history is significant for osteoarthritis.   Wound Check  She was originally treated 10 to 14 days ago (scraped left wrist on walker). Previous treatment included wound cleansing or irrigation. There is no redness present. The swelling has improved. The pain has improved.       Review of Systems   Cardiovascular: Negative for chest pain, orthopnea, palpitations and paroxysmal nocturnal dyspnea.   Respiratory: Negative for chest tightness and shortness of breath.    Musculoskeletal: Positive for back pain.   Gastrointestinal: Negative for abdominal pain.   Neurological: Negative for dizziness.       Social History     Tobacco Use   • Smoking status: Never Smoker  "  • Smokeless tobacco: Never Used   Substance Use Topics   • Alcohol use: No     O:   Vitals:    11/04/21 1438   BP: 124/82   Pulse: 79   SpO2: 99%   Weight: 63.5 kg (140 lb)   Height: 162.6 cm (64\")   PainSc: 0-No pain     Body mass index is 24.03 kg/m².  Vitals and nursing note reviewed.   Constitutional:       Appearance: Well-developed.   HENT:      Head: Normocephalic and atraumatic.   Neck:      Thyroid: No thyromegaly.      Vascular: No JVD.   Pulmonary:      Effort: Pulmonary effort is normal. No respiratory distress.      Breath sounds: Normal breath sounds. No wheezing. No rales.   Cardiovascular:      Normal rate. Regular rhythm. Normal heart sounds.      No gallop. No friction rub.   Abdominal:      General: Bowel sounds are normal. There is no distension.      Palpations: Abdomen is soft.      Tenderness: There is no abdominal tenderness. There is no guarding or rebound.   Musculoskeletal:      Cervical back: Neck supple. Skin:     General: Skin is warm and dry.      Comments: Left wrist bruising and scaling in watch distribution   Neurological:      Mental Status: Alert.   Psychiatric:         Behavior: Behavior normal.         Diagnoses and all orders for this visit:    1. Medicare annual wellness visit, subsequent (Primary)    2. Other specified hypothyroidism  -     TSH  -     T4, Free  -     T3, Free    3. Benign essential hypertension  -     Comprehensive Metabolic Panel    4. Paroxysmal atrial fibrillation (HCC)    5. Coronary arteriosclerosis in native artery  -     Comprehensive Metabolic Panel  -     Lipid Panel    6. Primary osteoarthritis involving multiple joints  -     Diclofenac Sodium (VOLTAREN) 1 % gel gel; Apply 4 g topically to the appropriate area as directed 4 (Four) Times a Day As Needed (knee/back or other arhritis pain).  Dispense: 100 g; Refill: 5    7. Chronic left-sided low back pain without sciatica  -     Diclofenac Sodium (VOLTAREN) 1 % gel gel; Apply 4 g topically to the " appropriate area as directed 4 (Four) Times a Day As Needed (knee/back or other arhritis pain).  Dispense: 100 g; Refill: 5    8. Dyslipidemia    9. Open wound of left wrist with complication, initial encounter  -     mupirocin (BACTROBAN) 2 % ointment; Apply 1 application topically to the appropriate area as directed 2 (Two) Times a Day.  Dispense: 30 g; Refill: 1    -hypertension - controlled, continue medications  -HLD - continue statin, recheck lipids.  -cad - lipid and bp control  -back pain and OA - gave sample voltaren gel, will send in some as well  -ointment for wound  -hypothyroidism - continue medication, recheck thyroid labs.      Return in about 6 months (around 5/4/2022), or if symptoms worsen or fail to improve.

## 2021-11-05 LAB
ALBUMIN SERPL-MCNC: 4 G/DL (ref 3.5–4.6)
ALBUMIN/GLOB SERPL: 1.7 {RATIO} (ref 1.2–2.2)
ALP SERPL-CCNC: 51 IU/L (ref 44–121)
ALT SERPL-CCNC: 12 IU/L (ref 0–32)
AST SERPL-CCNC: 22 IU/L (ref 0–40)
BILIRUB SERPL-MCNC: 0.7 MG/DL (ref 0–1.2)
BUN SERPL-MCNC: 22 MG/DL (ref 10–36)
BUN/CREAT SERPL: 17 (ref 12–28)
CALCIUM SERPL-MCNC: 9.5 MG/DL (ref 8.7–10.3)
CHLORIDE SERPL-SCNC: 95 MMOL/L (ref 96–106)
CHOLEST SERPL-MCNC: 163 MG/DL (ref 100–199)
CO2 SERPL-SCNC: 21 MMOL/L (ref 20–29)
CREAT SERPL-MCNC: 1.26 MG/DL (ref 0.57–1)
GLOBULIN SER CALC-MCNC: 2.3 G/DL (ref 1.5–4.5)
GLUCOSE SERPL-MCNC: 97 MG/DL (ref 65–99)
HDLC SERPL-MCNC: 54 MG/DL
LDLC SERPL CALC-MCNC: 82 MG/DL (ref 0–99)
POTASSIUM SERPL-SCNC: 4.6 MMOL/L (ref 3.5–5.2)
PROT SERPL-MCNC: 6.3 G/DL (ref 6–8.5)
SODIUM SERPL-SCNC: 131 MMOL/L (ref 134–144)
T3FREE SERPL-MCNC: 2.6 PG/ML (ref 2–4.4)
T4 FREE SERPL-MCNC: 1.7 NG/DL (ref 0.82–1.77)
TRIGL SERPL-MCNC: 156 MG/DL (ref 0–149)
TSH SERPL DL<=0.005 MIU/L-ACNC: 2.6 UIU/ML (ref 0.45–4.5)
VLDLC SERPL CALC-MCNC: 27 MG/DL (ref 5–40)

## 2021-11-06 NOTE — PROGRESS NOTES
Mail copy of results to patient.  Kidney function declines stable  Cholesterol controlled  Thyroid appropriate range  Sodium down slightly, will monitor and check next office visit.

## 2021-12-05 DIAGNOSIS — E03.8 OTHER SPECIFIED HYPOTHYROIDISM: ICD-10-CM

## 2021-12-06 RX ORDER — LIOTHYRONINE SODIUM 25 UG/1
TABLET ORAL
Qty: 45 TABLET | Refills: 5 | Status: SHIPPED | OUTPATIENT
Start: 2021-12-06

## 2022-02-08 DIAGNOSIS — E03.8 OTHER SPECIFIED HYPOTHYROIDISM: ICD-10-CM

## 2022-02-08 RX ORDER — LEVOTHYROXINE SODIUM 88 UG/1
TABLET ORAL
Qty: 90 TABLET | Refills: 1 | Status: SHIPPED | OUTPATIENT
Start: 2022-02-08

## 2022-04-27 RX ORDER — ALENDRONATE SODIUM 70 MG/1
TABLET ORAL
Qty: 12 TABLET | Refills: 0 | Status: SHIPPED | OUTPATIENT
Start: 2022-04-27

## 2022-06-17 RX ORDER — PRAVASTATIN SODIUM 20 MG
TABLET ORAL
Qty: 30 TABLET | Refills: 0 | OUTPATIENT
Start: 2022-06-17

## 2022-06-27 RX ORDER — PRAVASTATIN SODIUM 20 MG
TABLET ORAL
Qty: 30 TABLET | Refills: 0 | OUTPATIENT
Start: 2022-06-27

## 2022-07-07 RX ORDER — PRAVASTATIN SODIUM 20 MG
TABLET ORAL
Qty: 30 TABLET | Refills: 0 | Status: SHIPPED | OUTPATIENT
Start: 2022-07-07

## 2022-10-25 ENCOUNTER — TELEPHONE (OUTPATIENT)
Dept: FAMILY MEDICINE CLINIC | Facility: CLINIC | Age: 87
End: 2022-10-25

## 2022-10-28 ENCOUNTER — TELEPHONE (OUTPATIENT)
Dept: FAMILY MEDICINE CLINIC | Facility: CLINIC | Age: 87
End: 2022-10-28

## 2022-11-22 NOTE — TELEPHONE ENCOUNTER
I spoke with daughter and she wanted to know what meds pt should be taking. I told her since we haven't seen pt since 11/4/21 they should defer to the rehab facility pt is in at this time.

## 2025-01-16 NOTE — TELEPHONE ENCOUNTER
Left message for pt's daughter to call back  
PATIENTS DAUGHTER BRIJESH STATES PATIENT IS CURRENTLY IN THE HOSPITAL FORM A FALL. BRIJESH IS REQUESTING A CALL BACK FROM DR. MITTAL TO DISCUSS PATIENTS STATE..    IF POSSIBLE PLEASE CALL TODAY   BRIJESH> [phone]        
Pt's daughter called and said pt fell and is at [facility].   
No